# Patient Record
Sex: FEMALE | Race: WHITE | NOT HISPANIC OR LATINO | Employment: FULL TIME | URBAN - METROPOLITAN AREA
[De-identification: names, ages, dates, MRNs, and addresses within clinical notes are randomized per-mention and may not be internally consistent; named-entity substitution may affect disease eponyms.]

---
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
## 2024-02-04 ASSESSMENT — ENCOUNTER SYMPTOMS
CHILLS: 0
SHORTNESS OF BREATH: 0
FEVER: 0

## 2024-02-05 ENCOUNTER — OFFICE VISIT (OUTPATIENT)
Dept: MEDICAL GROUP | Facility: PHYSICIAN GROUP | Age: 19
End: 2024-02-05
Payer: COMMERCIAL

## 2024-02-05 VITALS
HEIGHT: 64 IN | BODY MASS INDEX: 20.01 KG/M2 | WEIGHT: 117.2 LBS | SYSTOLIC BLOOD PRESSURE: 90 MMHG | DIASTOLIC BLOOD PRESSURE: 56 MMHG | HEART RATE: 62 BPM | TEMPERATURE: 98 F | OXYGEN SATURATION: 99 % | RESPIRATION RATE: 16 BRPM

## 2024-02-05 DIAGNOSIS — E72.01 CYSTINURIA (HCC): ICD-10-CM

## 2024-02-05 DIAGNOSIS — N20.0 CALCULUS OF KIDNEY: ICD-10-CM

## 2024-02-05 DIAGNOSIS — F41.9 ANXIETY: ICD-10-CM

## 2024-02-05 PROCEDURE — 3074F SYST BP LT 130 MM HG: CPT | Performed by: PHYSICIAN ASSISTANT

## 2024-02-05 PROCEDURE — 99214 OFFICE O/P EST MOD 30 MIN: CPT | Performed by: PHYSICIAN ASSISTANT

## 2024-02-05 PROCEDURE — 3078F DIAST BP <80 MM HG: CPT | Performed by: PHYSICIAN ASSISTANT

## 2024-02-05 RX ORDER — BUSPIRONE HYDROCHLORIDE 10 MG/1
5-10 TABLET ORAL 2 TIMES DAILY PRN
Qty: 180 TABLET | Refills: 1 | Status: SHIPPED | OUTPATIENT
Start: 2024-02-05

## 2024-02-05 ASSESSMENT — FIBROSIS 4 INDEX: FIB4 SCORE: 0.66

## 2024-02-05 NOTE — LETTER
2024    To Whom It May Concern,    Regarding: Tatiana Chávez : 2005; Emotional Support Animal letter    Tatiana is my patient, and has been under my care since 3/2023. I am intimately aware of Ivory's medical history and functional restrictions brought by her overall health . Ivory meets the definition of disabled under the Americans with Disabilities Act, the Rehabilitation Act of 1973, and the Fair Housing Act.    In order to assist in alleviating difficulties related to her healthcare, and to improve their ability to lead a better life while fully enjoying and using the dwelling unit you own and/or manage, I am prescribing an Emotional Support Animal prescription letter that will help Tatiana in dealing with her disability better.     I am very much aware of the voluminous professional literature related to the therapeutic benefits of emotional support animals for individuals with mental disabilities, such as that faced by Ivory.     I am a licensed physician assistant in the [facility]. My license number is [id number].    Should you have any further questions, please do not hesitate to contact with me.        Ester Lopez P.A.-C.

## 2024-02-05 NOTE — PROGRESS NOTES
"Subjective:     CC: anxiety    HPI:   Ivory presents today with the following:    Problem   Anxiety    Chronic, controlled.   KACY-7: 15   Has been on medications in the past.  Has seen psychiatry and therapy in the past.  Asking for emotional support letter for her dog to help manage day to day issues.  Would like to see therapy again.  Has been using buspirone 10 mg as needed and it does seem to be helping without making her too drowsy.     Cystinuria (Hcc)    Chronic, uncontrolled  Managed by urology.  Was put on a medication called Thiola to help decrease her kidney stone production but apparently this medication is very hard on the kidneys and her nephrologist told her to stop taking it.  She is now having increased stone production as there does not appear to be another solution.  She would like to see if [facility] has urology often running, otherwise she will continue with urology [place].                  ROS:  Review of Systems   Constitutional:  Negative for chills and fever.   Respiratory:  Negative for shortness of breath.    Cardiovascular:  Negative for chest pain.       Objective:     Exam:  BP 90/56 (BP Location: Left arm, Patient Position: Sitting, BP Cuff Size: Adult)   Pulse 62   Temp 36.7 °C (98 °F) (Temporal)   Resp 16   Ht 1.626 m (5' 4\")   Wt 53.2 kg (117 lb 3.2 oz)   LMP 12/29/2023 (Approximate)   SpO2 99%   Breastfeeding No   BMI 20.12 kg/m²  Body mass index is 20.12 kg/m².    Physical Exam  Vitals reviewed.   Constitutional:       General: She is not in acute distress.     Appearance: Normal appearance.   Pulmonary:      Effort: Pulmonary effort is normal.   Neurological:      General: No focal deficit present.      Mental Status: She is alert.   Psychiatric:         Mood and Affect: Mood normal.         Behavior: Behavior normal.         Judgment: Judgment normal.                  Assessment & Plan by Problem:     1. Anxiety  Chronic, intermittent controlled.  Continue buspirone 10 " mg up to twice daily as needed for anxiety.  Referring to therapy.    - Referral to Behavioral Health  - busPIRone (BUSPAR) 10 MG Tab tablet; Take 0.5-1 Tablets by mouth 2 times a day as needed (anxiety/sleep).  Dispense: 180 Tablet; Refill: 1    2. Cystinuria (HCC)  Chronic, intermittent control.  Had to stop Thiola as it was causing kidney issues.  This seems to be the only thing to help prevent kidney stones.  Referring patient to [facility] urology.    - Referral to Urology    3. Nephrolithiasis  See #2.    - Referral to Urology        Please note that this dictation was created using voice recognition software. I have made every reasonable attempt to correct obvious errors, but I expect that there are errors of grammar and possibly content that I did not discover before finalizing the note.

## 2024-02-07 ENCOUNTER — APPOINTMENT (OUTPATIENT)
Dept: RADIOLOGY | Facility: MEDICAL CENTER | Age: 19
End: 2024-02-07
Attending: EMERGENCY MEDICINE

## 2024-02-07 ENCOUNTER — APPOINTMENT (OUTPATIENT)
Dept: RADIOLOGY | Facility: MEDICAL CENTER | Age: 19
End: 2024-02-07

## 2024-02-07 ENCOUNTER — HOSPITAL ENCOUNTER (EMERGENCY)
Facility: MEDICAL CENTER | Age: 19
End: 2024-02-07
Attending: EMERGENCY MEDICINE
Payer: COMMERCIAL

## 2024-02-07 VITALS
WEIGHT: 116.62 LBS | OXYGEN SATURATION: 96 % | TEMPERATURE: 97.6 F | DIASTOLIC BLOOD PRESSURE: 74 MMHG | BODY MASS INDEX: 20.02 KG/M2 | RESPIRATION RATE: 16 BRPM | SYSTOLIC BLOOD PRESSURE: 130 MMHG | HEART RATE: 69 BPM

## 2024-02-07 DIAGNOSIS — R11.0 NAUSEA: ICD-10-CM

## 2024-02-07 DIAGNOSIS — R10.9 FLANK PAIN: ICD-10-CM

## 2024-02-07 LAB
ALBUMIN SERPL BCP-MCNC: 4.6 G/DL (ref 3.2–4.9)
ALBUMIN/GLOB SERPL: 1.6 G/DL
ALP SERPL-CCNC: 54 U/L (ref 30–99)
ALT SERPL-CCNC: 14 U/L (ref 2–50)
AMORPH CRY #/AREA URNS HPF: PRESENT /HPF
ANION GAP SERPL CALC-SCNC: 11 MMOL/L (ref 7–16)
APPEARANCE UR: ABNORMAL
AST SERPL-CCNC: 21 U/L (ref 12–45)
BACTERIA #/AREA URNS HPF: NEGATIVE /HPF
BASOPHILS # BLD AUTO: 0.5 % (ref 0–1.8)
BASOPHILS # BLD: 0.04 K/UL (ref 0–0.12)
BILIRUB SERPL-MCNC: 0.5 MG/DL (ref 0.1–1.5)
BILIRUB UR QL STRIP.AUTO: NEGATIVE
BUN SERPL-MCNC: 14 MG/DL (ref 8–22)
CALCIUM ALBUM COR SERPL-MCNC: 8.9 MG/DL (ref 8.5–10.5)
CALCIUM SERPL-MCNC: 9.4 MG/DL (ref 8.5–10.5)
CHLORIDE SERPL-SCNC: 106 MMOL/L (ref 96–112)
CO2 SERPL-SCNC: 19 MMOL/L (ref 20–33)
COLOR UR: YELLOW
CREAT SERPL-MCNC: 0.65 MG/DL (ref 0.5–1.4)
CYSTINE CRY #/AREA URNS HPF: POSITIVE /HPF
EOSINOPHIL # BLD AUTO: 0.08 K/UL (ref 0–0.51)
EOSINOPHIL NFR BLD: 1.1 % (ref 0–6.9)
EPI CELLS #/AREA URNS HPF: ABNORMAL /HPF
ERYTHROCYTE [DISTWIDTH] IN BLOOD BY AUTOMATED COUNT: 39.6 FL (ref 35.9–50)
FLUAV RNA SPEC QL NAA+PROBE: NEGATIVE
FLUBV RNA SPEC QL NAA+PROBE: NEGATIVE
GFR SERPLBLD CREATININE-BSD FMLA CKD-EPI: 129 ML/MIN/1.73 M 2
GLOBULIN SER CALC-MCNC: 2.9 G/DL (ref 1.9–3.5)
GLUCOSE SERPL-MCNC: 86 MG/DL (ref 65–99)
GLUCOSE UR STRIP.AUTO-MCNC: NEGATIVE MG/DL
HCG SERPL QL: NEGATIVE
HCT VFR BLD AUTO: 43.4 % (ref 37–47)
HGB BLD-MCNC: 15.2 G/DL (ref 12–16)
HYALINE CASTS #/AREA URNS LPF: ABNORMAL /LPF
IMM GRANULOCYTES # BLD AUTO: 0.01 K/UL (ref 0–0.11)
IMM GRANULOCYTES NFR BLD AUTO: 0.1 % (ref 0–0.9)
KETONES UR STRIP.AUTO-MCNC: NEGATIVE MG/DL
LACTATE SERPL-SCNC: 0.8 MMOL/L (ref 0.5–2)
LACTATE SERPL-SCNC: 1.3 MMOL/L (ref 0.5–2)
LEUKOCYTE ESTERASE UR QL STRIP.AUTO: NEGATIVE
LIPASE SERPL-CCNC: 25 U/L (ref 11–82)
LYMPHOCYTES # BLD AUTO: 1.57 K/UL (ref 1–4.8)
LYMPHOCYTES NFR BLD: 20.6 % (ref 22–41)
MCH RBC QN AUTO: 31.7 PG (ref 27–33)
MCHC RBC AUTO-ENTMCNC: 35 G/DL (ref 32.2–35.5)
MCV RBC AUTO: 90.4 FL (ref 81.4–97.8)
MICRO URNS: ABNORMAL
MONOCYTES # BLD AUTO: 0.43 K/UL (ref 0–0.85)
MONOCYTES NFR BLD AUTO: 5.7 % (ref 0–13.4)
NEUTROPHILS # BLD AUTO: 5.48 K/UL (ref 1.82–7.42)
NEUTROPHILS NFR BLD: 72 % (ref 44–72)
NITRITE UR QL STRIP.AUTO: NEGATIVE
NRBC # BLD AUTO: 0 K/UL
NRBC BLD-RTO: 0 /100 WBC (ref 0–0.2)
PH UR STRIP.AUTO: 7 [PH] (ref 5–8)
PLATELET # BLD AUTO: 238 K/UL (ref 164–446)
PMV BLD AUTO: 8.8 FL (ref 9–12.9)
POTASSIUM SERPL-SCNC: 4.6 MMOL/L (ref 3.6–5.5)
PROT SERPL-MCNC: 7.5 G/DL (ref 6–8.2)
PROT UR QL STRIP: NEGATIVE MG/DL
RBC # BLD AUTO: 4.8 M/UL (ref 4.2–5.4)
RBC # URNS HPF: ABNORMAL /HPF
RBC UR QL AUTO: NEGATIVE
RSV RNA SPEC QL NAA+PROBE: NEGATIVE
SARS-COV-2 RNA RESP QL NAA+PROBE: NOTDETECTED
SODIUM SERPL-SCNC: 136 MMOL/L (ref 135–145)
SP GR UR STRIP.AUTO: 1.02
SPECIMEN SOURCE: NORMAL
UROBILINOGEN UR STRIP.AUTO-MCNC: 0.2 MG/DL
WBC # BLD AUTO: 7.6 K/UL (ref 4.8–10.8)
WBC #/AREA URNS HPF: ABNORMAL /HPF

## 2024-02-07 PROCEDURE — 85025 COMPLETE CBC W/AUTO DIFF WBC: CPT

## 2024-02-07 PROCEDURE — 96376 TX/PRO/DX INJ SAME DRUG ADON: CPT

## 2024-02-07 PROCEDURE — 700111 HCHG RX REV CODE 636 W/ 250 OVERRIDE (IP): Mod: JZ | Performed by: EMERGENCY MEDICINE

## 2024-02-07 PROCEDURE — 87086 URINE CULTURE/COLONY COUNT: CPT

## 2024-02-07 PROCEDURE — 96375 TX/PRO/DX INJ NEW DRUG ADDON: CPT

## 2024-02-07 PROCEDURE — 83605 ASSAY OF LACTIC ACID: CPT | Mod: 91

## 2024-02-07 PROCEDURE — 81001 URINALYSIS AUTO W/SCOPE: CPT

## 2024-02-07 PROCEDURE — 87040 BLOOD CULTURE FOR BACTERIA: CPT

## 2024-02-07 PROCEDURE — 71045 X-RAY EXAM CHEST 1 VIEW: CPT

## 2024-02-07 PROCEDURE — 99285 EMERGENCY DEPT VISIT HI MDM: CPT

## 2024-02-07 PROCEDURE — 96374 THER/PROPH/DIAG INJ IV PUSH: CPT

## 2024-02-07 PROCEDURE — 74177 CT ABD & PELVIS W/CONTRAST: CPT

## 2024-02-07 PROCEDURE — 83690 ASSAY OF LIPASE: CPT

## 2024-02-07 PROCEDURE — 700117 HCHG RX CONTRAST REV CODE 255: Performed by: EMERGENCY MEDICINE

## 2024-02-07 PROCEDURE — 0241U HCHG SARS-COV-2 COVID-19 NFCT DS RESP RNA 4 TRGT MIC: CPT

## 2024-02-07 PROCEDURE — 36415 COLL VENOUS BLD VENIPUNCTURE: CPT

## 2024-02-07 PROCEDURE — 700105 HCHG RX REV CODE 258: Performed by: EMERGENCY MEDICINE

## 2024-02-07 PROCEDURE — 84703 CHORIONIC GONADOTROPIN ASSAY: CPT

## 2024-02-07 PROCEDURE — 80053 COMPREHEN METABOLIC PANEL: CPT

## 2024-02-07 RX ORDER — SODIUM CHLORIDE, SODIUM LACTATE, POTASSIUM CHLORIDE, CALCIUM CHLORIDE 600; 310; 30; 20 MG/100ML; MG/100ML; MG/100ML; MG/100ML
1000 INJECTION, SOLUTION INTRAVENOUS ONCE
Status: COMPLETED | OUTPATIENT
Start: 2024-02-07 | End: 2024-02-07

## 2024-02-07 RX ORDER — ONDANSETRON 2 MG/ML
4 INJECTION INTRAMUSCULAR; INTRAVENOUS ONCE
Status: COMPLETED | OUTPATIENT
Start: 2024-02-07 | End: 2024-02-07

## 2024-02-07 RX ORDER — HYDROCODONE BITARTRATE AND ACETAMINOPHEN 5; 325 MG/1; MG/1
1 TABLET ORAL EVERY 6 HOURS PRN
Qty: 8 TABLET | Refills: 0 | Status: SHIPPED | OUTPATIENT
Start: 2024-02-07 | End: 2024-02-09

## 2024-02-07 RX ORDER — MORPHINE SULFATE 4 MG/ML
4 INJECTION INTRAVENOUS ONCE
Status: COMPLETED | OUTPATIENT
Start: 2024-02-07 | End: 2024-02-07

## 2024-02-07 RX ORDER — CEFDINIR 300 MG/1
300 CAPSULE ORAL 2 TIMES DAILY
Qty: 14 CAPSULE | Refills: 0 | Status: ACTIVE | OUTPATIENT
Start: 2024-02-07 | End: 2024-02-14

## 2024-02-07 RX ORDER — ONDANSETRON 4 MG/1
4 TABLET, ORALLY DISINTEGRATING ORAL EVERY 8 HOURS PRN
Qty: 6 TABLET | Refills: 0 | Status: SHIPPED | OUTPATIENT
Start: 2024-02-07 | End: 2024-02-09

## 2024-02-07 RX ADMIN — MORPHINE SULFATE 4 MG: 4 INJECTION, SOLUTION INTRAMUSCULAR; INTRAVENOUS at 11:58

## 2024-02-07 RX ADMIN — ONDANSETRON 4 MG: 2 INJECTION INTRAMUSCULAR; INTRAVENOUS at 11:59

## 2024-02-07 RX ADMIN — ONDANSETRON 4 MG: 2 INJECTION INTRAMUSCULAR; INTRAVENOUS at 14:35

## 2024-02-07 RX ADMIN — MORPHINE SULFATE 4 MG: 4 INJECTION, SOLUTION INTRAMUSCULAR; INTRAVENOUS at 15:52

## 2024-02-07 RX ADMIN — SODIUM CHLORIDE, POTASSIUM CHLORIDE, SODIUM LACTATE AND CALCIUM CHLORIDE 1000 ML: 600; 310; 30; 20 INJECTION, SOLUTION INTRAVENOUS at 13:30

## 2024-02-07 RX ADMIN — IOHEXOL 80 ML: 350 INJECTION, SOLUTION INTRAVENOUS at 16:15

## 2024-02-07 ASSESSMENT — PAIN DESCRIPTION - PAIN TYPE: TYPE: ACUTE PAIN

## 2024-02-07 ASSESSMENT — FIBROSIS 4 INDEX: FIB4 SCORE: 0.66

## 2024-02-07 NOTE — ED TRIAGE NOTES
Chief Complaint   Patient presents with    Nausea     X 2 days    Flank Pain     R sided flank pain that started last night.  C/O urgency, burning with urination x 4 days.  H/O kidney disease and frequent kidney stones.     Pt declined protocl zofran at this time, as she took some at 0500 this morning.  Sepsis protocol initiated.  SARS screening = sepsis watch.

## 2024-02-07 NOTE — ED PROVIDER NOTES
"ED Provider Note    CHIEF COMPLAINT  Chief Complaint   Patient presents with    Nausea     X 2 days    Flank Pain     R sided flank pain that started last night.  C/O urgency, burning with urination x 4 days.  H/O kidney disease and frequent kidney stones.       EXTERNAL RECORDS REVIEWED  Other patient had urine culture showing E. coli which is pansensitive in February 2023 .  Patient is followed by Dr. Muñoz, nephrologist, for cystinuria as well as CKD 1.  Patient has a history of lithotripsy, hydronephrosis, nephrolithiasis, and ureteral stents.  Patient was admitted here at [facility] in August 2023 for significant bilateral hydronephrosis with obstructive kidney stones, worse on the left than the right.  She underwent cystoscopy and bilateral ureteroscopy with laser lithotripsy and bilateral ureteral stent placement on August 27, 2023.  Symptoms improved and she was cleared by urology and discharged home.    Patient was seen by her primary care doctor 2 days ago for anxiety.    HPI/ROS  LIMITATION TO HISTORY   Select: : None  OUTSIDE HISTORIAN(S):  None    Ivory Chávez is a 19 y.o. female who presents to the ER complaining of bilateral flank pain, right greater than left which began last night in the middle the night.  Patient says she developed nausea 2 days ago.  The nausea got worse last night.  She had 1 episode of vomiting.  She says it feels like her \"kidney pain.\"  She has a long history of kidney stones and hydronephrosis.  She has had several kidney surgeries within the last1 year with Dr. Ramirez and Dr. Harrison.  Patient states she has chronic urinary tract infections.  She says she has a standing prescription for cefdinir.  The last time she took cefdinir was in December.  She says she feels like she has urinary tract infection.  She has had some pain with urination.  No hematuria.  Last night she had subjective fevers and shaking chills.  She did not take her temperature.  She says " she has chronic diarrhea due to IBS.  Nothing new or different.  No cough, runny nose or sore throat.  She still has an appendix on her gallbladder.    PAST MEDICAL HISTORY   has a past medical history of Allergy, Anxiety, Cystinuria (HCC), Cystinuria (HCC), Nondisplaced bimalleolar fracture of left lower leg, initial encounter for closed fracture (2007), Psychiatric problem, Renal disorder, and Urinary tract infection.    SURGICAL HISTORY   has a past surgical history that includes other (2006); percutaneous nephrostolithotomy (Left, 11/5/2018); stent replacement (Left, 11/5/2018); ureteroscopy (Left, 11/13/2018); stent placement (Left, 11/13/2018); cystoscopy (11/13/2018); lithotripsy (Left, 11/13/2018); cystoscopy,insert ureteral stent (Left, 8/19/2019); lasertripsy (Left, 8/19/2019); cystoscopy,insert ureteral stent (Left, 7/1/2020); cysto/uretero/pyeloscopy, dx (Left, 7/1/2020); lasertripsy (7/1/2020); cystoscopy,insert ureteral stent (Left, 2/24/2022); other orthopedic surgery (2021); cystoscopy,insert ureteral stent (Left, 3/7/2022); cysto/uretero/pyeloscopy, dx (Left, 3/7/2022); lasertripsy (Left, 3/7/2022); cystoscopy,insert ureteral stent (N/A, 3/16/2023); cysto/uretero/pyeloscopy, dx (Left, 3/16/2023); cysto stent placemnt pre surg (Bilateral, 8/27/2023); and lasertripsy (Bilateral, 8/27/2023).    FAMILY HISTORY  Family History   Problem Relation Age of Onset    No Known Problems Mother     No Known Problems Father     No Known Problems Sister     No Known Problems Sister     No Known Problems Brother     No Known Problems Maternal Grandmother     No Known Problems Maternal Grandfather     No Known Problems Paternal Grandmother     No Known Problems Paternal Grandfather     Cancer Neg Hx     Diabetes Neg Hx     Heart Disease Neg Hx     Hyperlipidemia Neg Hx     Hypertension Neg Hx     Stroke Neg Hx        SOCIAL HISTORY  Social History     Tobacco Use    Smoking status: Never    Smokeless tobacco: Never    Vaping Use    Vaping Use: Former    Substances: Nicotine    Devices: Disposable   Substance and Sexual Activity    Alcohol use: Not Currently    Drug use: Never    Sexual activity: Yes     Partners: Male     Birth control/protection: Injection       CURRENT MEDICATIONS  Home Medications       Reviewed by Heather Maloney R.N. (Registered Nurse) on 02/07/24 at 0939  Med List Status: Partial     Medication Last Dose Status   busPIRone (BUSPAR) 10 MG Tab tablet  Active   EPINEPHrine (EPIPEN) 0.3 MG/0.3ML Solution Auto-injector solution for injection  Active   ibuprofen (MOTRIN) 400 MG Tab  Active   omeprazole (PRILOSEC) 40 MG delayed-release capsule  Active   ondansetron (ZOFRAN ODT) 4 MG TABLET DISPERSIBLE  Active   Potassium Citrate 15 MEQ (1620 MG) Tab CR  Active   Tiopronin (THIOLA EC) 300 MG Tablet Delayed Response  Active                    ALLERGIES  Allergies   Allergen Reactions    Latex Anaphylaxis       PHYSICAL EXAM  VITAL SIGNS: /74   Pulse 69   Temp 36.4 °C (97.6 °F) (Temporal)   Resp 16   Wt 52.9 kg (116 lb 10 oz)   LMP 12/29/2023 (Approximate)   SpO2 96%   BMI 20.02 kg/m²    Constitutional:  Well developed, well nourished; mild to moderate distress.  Crying and tearful.  HENT: Normocephalic, Atraumatic, Bilateral external ears normal, slightly dry mucous membranes.  Mild erythema in the posterior pharynx.  No exudate.  Eyes: PERRL, EOMI, Conjunctiva normal, No discharge.   Neck: Normal range of motion, supple, nontender  Lymphatic: No lymphadenopathy noted.   Cardiovascular: Normal heart rate, Normal rhythm, No murmurs, rubs or gallops   Thorax & Lungs: CTA=bilaterally;  No respiratory distress,  No wheezing rales, or rhonchi; No chest tenderness. No crepitus or subQ air  Abdomen: Soft, good bowel sounds, tender to percussion in the left lower quadrant.  Tender to palpation suprapubic.  No guarding no rebound, no masses, no pulsatile mass, no tenderness, no distention  Skin: Warm, Dry,  No erythema, No rash.   Back: No tenderness, bilateral CVA tenderness, right greater than left.   Extremities: 2+ dp and pt pulses bilateral LEs;  Nontender; no pretibial edema  Neurologic: Alert & oriented x 4, clear speech  Psychiatric: appropriate, normal affect     DIAGNOSTIC STUDIES / PROCEDURES      LABS  Results for orders placed or performed during the hospital encounter of 02/07/24   Lactic Acid   Result Value Ref Range    Lactic Acid 0.8 0.5 - 2.0 mmol/L   Lactic Acid   Result Value Ref Range    Lactic Acid 1.3 0.5 - 2.0 mmol/L   CBC with Differential   Result Value Ref Range    WBC 7.6 4.8 - 10.8 K/uL    RBC 4.80 4.20 - 5.40 M/uL    Hemoglobin 15.2 12.0 - 16.0 g/dL    Hematocrit 43.4 37.0 - 47.0 %    MCV 90.4 81.4 - 97.8 fL    MCH 31.7 27.0 - 33.0 pg    MCHC 35.0 32.2 - 35.5 g/dL    RDW 39.6 35.9 - 50.0 fL    Platelet Count 238 164 - 446 K/uL    MPV 8.8 (L) 9.0 - 12.9 fL    Neutrophils-Polys 72.00 44.00 - 72.00 %    Lymphocytes 20.60 (L) 22.00 - 41.00 %    Monocytes 5.70 0.00 - 13.40 %    Eosinophils 1.10 0.00 - 6.90 %    Basophils 0.50 0.00 - 1.80 %    Immature Granulocytes 0.10 0.00 - 0.90 %    Nucleated RBC 0.00 0.00 - 0.20 /100 WBC    Neutrophils (Absolute) 5.48 1.82 - 7.42 K/uL    Lymphs (Absolute) 1.57 1.00 - 4.80 K/uL    Monos (Absolute) 0.43 0.00 - 0.85 K/uL    Eos (Absolute) 0.08 0.00 - 0.51 K/uL    Baso (Absolute) 0.04 0.00 - 0.12 K/uL    Immature Granulocytes (abs) 0.01 0.00 - 0.11 K/uL    NRBC (Absolute) 0.00 K/uL   Complete Metabolic Panel   Result Value Ref Range    Sodium 136 135 - 145 mmol/L    Potassium 4.6 3.6 - 5.5 mmol/L    Chloride 106 96 - 112 mmol/L    Co2 19 (L) 20 - 33 mmol/L    Anion Gap 11.0 7.0 - 16.0    Glucose 86 65 - 99 mg/dL    Bun 14 8 - 22 mg/dL    Creatinine 0.65 0.50 - 1.40 mg/dL    Calcium 9.4 8.5 - 10.5 mg/dL    Correct Calcium 8.9 8.5 - 10.5 mg/dL    AST(SGOT) 21 12 - 45 U/L    ALT(SGPT) 14 2 - 50 U/L    Alkaline Phosphatase 54 30 - 99 U/L    Total Bilirubin 0.5 0.1  - 1.5 mg/dL    Albumin 4.6 3.2 - 4.9 g/dL    Total Protein 7.5 6.0 - 8.2 g/dL    Globulin 2.9 1.9 - 3.5 g/dL    A-G Ratio 1.6 g/dL   Urinalysis    Specimen: Urine, Clean Catch; Blood   Result Value Ref Range    Color Yellow     Character Turbid (A)     Specific Gravity 1.018 <1.035    Ph 7.0 5.0 - 8.0    Glucose Negative Negative mg/dL    Ketones Negative Negative mg/dL    Protein Negative Negative mg/dL    Bilirubin Negative Negative    Urobilinogen, Urine 0.2 Negative    Nitrite Negative Negative    Leukocyte Esterase Negative Negative    Occult Blood Negative Negative    Micro Urine Req Microscopic    URINE MICROSCOPIC (W/UA)   Result Value Ref Range    WBC 2-5 /hpf    RBC 0-2 /hpf    Bacteria Negative None /hpf    Epithelial Cells Few /hpf    Amorphous Crystal Present /hpf    Cystine Crystal Positive (A) /hpf    Hyaline Cast 0-2 /lpf   CoV-2, FLU A/B, and RSV by PCR (2-4 Hours CEPHEID) : Collect NP swab in VTM    Specimen: Respirate   Result Value Ref Range    Influenza virus A RNA Negative Negative    Influenza virus B, PCR Negative Negative    RSV, PCR Negative Negative    SARS-CoV-2 by PCR NotDetected     SARS-CoV-2 Source NP Swab    LIPASE   Result Value Ref Range    Lipase 25 11 - 82 U/L   HCG QUAL SERUM   Result Value Ref Range    Beta-Hcg Qualitative Serum Negative Negative   ESTIMATED GFR   Result Value Ref Range    GFR (CKD-EPI) 129 >60 mL/min/1.73 m 2        RADIOLOGY  I have independently interpreted the diagnostic imaging associated with this visit and am waiting the final reading from the radiologist.     My preliminary interpretation is as follows: SHAHAB TREADWELL is reviewed the patient's CT scan.  There is no obvious hydronephrosis.    Radiologist interpretation:   CT-ABDOMEN-PELVIS WITH   Final Result      1.  Probable small bilateral nonobstructing kidney stones.   2.  No ureteral stone or hydronephrosis.   3.  No secondary signs of acute appendicitis, however the appendix is partially obscured.   4.   "Probable RIGHT ovarian follicle.   5.  Increased colonic stool.         DX-CHEST-PORTABLE (1 VIEW)   Final Result         1. No acute cardiopulmonary abnormalities are identified.           COURSE & MEDICAL DECISION MAKING    ED Observation Status? No; Patient does not meet criteria for ED Observation.     INITIAL ASSESSMENT, COURSE AND PLAN  Care Narrative: Patient presents to the ER complaining of nausea for the last several days.  Last night she started having bilateral flank pain, right greater than left.  She says that this pain \"feels like my kidney pain.\"  She reported having some subjective fever and chills last night when the pain started.  Patient has some right-sided CVA tenderness.  She had some mild right upper quadrant abdominal tenderness.  She has long history of nephrolithiasis, ureteral stones, urologic procedures for hydronephrosis and stones, as well as cystinuria.  She is followed by nephrology as well as by urology.  Patient was last admitted here in August 2023 for bilateral hydronephrosis due to bilateral kidney stones.  The patient denies having fevers or chills.  She said she vomited once.  No diarrhea.  No hematuria or cloudy/foul-smelling urine.  No cough, runny nose or sore throat.  Patient was given several rounds of pain medication with improvement.  She was also given several rounds of nausea medicine with improvement.  All of her labs look totally normal.  Lactic acid level is normal.  White count is normal.  Renal functions normal.  No evidence of pancreatitis.  She is not pregnant.  Chest x-ray is negative.  COVID, flu and RSV are negative.  No evidence of pneumonia.  No evidence of COVID, flu or RSV.  CT scan is negative.  No evidence of ureteral stones.  No evidence of hydronephrosis.  No evidence of appendicitis or any other dangerous abdominal pathology.  At this time I think the patient is safe and stable for outpatient management discharge home.  Since she is only had pain " though for less than 24 hours, I told her if she still having pain in the next 18 to 24 hours she should return for recheck.  She agrees to do so.  Otherwise I will send her home with prescription for pain medication as well as for nausea medicine.  She has been given very strict return precautions and discharge instructions and she understands treatment plan and follow-up.  At this time I do not have a great explanation for her pain, although she says it feels very much like her kidney pain.  Perhaps she has some scar tissue causing her discomfort from all of her urologic procedures in the past.  Perhaps she has some renal colic as a result of all of her urologic issues.  Nonetheless, she is not septic or toxic.  I think she is stable for outpatient management discharge home at this time.  I have asked that she follow-up with her primary care physician and her urologist within the next several days possible.       HYDRATION: Based on the patient's presentation of Acute Vomiting the patient was given IV fluids. IV Hydration was used because oral hydration was not as rapid as required. Upon recheck following hydration, the patient was improved.      ADDITIONAL PROBLEM LIST  Problem #1: Nausea x 2 days  Problem #2: Bilateral flank pain, right greater than left, since last night    DISPOSITION AND DISCUSSIONS  I have discussed management of the patient with the following physicians and LAVINIA's: None    Discussion of management with other QHP or appropriate source(s): None     Escalation of care considered, and ultimately not performed: At this time I do not think patient needs to be admitted.  White count is normal.  Lactic acid level is normal.  Urine is clear.  CT scan is negative.  No evidence of hydronephrosis or ureteral stone.  No other dangerous intra-abdominal pathology to explain her pain or symptoms.  Vitals are normal and stable.  Lab work is unremarkable.  I think she is safe for outpatient management  discharge home with close follow-up here in the ER in 18 to 24 hours if she still is having pain.  Patient understands importance of returning if pain is not improved in the 18 to 24 hours.    Barriers to care at this time, including but not limited to:  None known .     Decision tools and prescription drugs considered including, but not limited to: Antibiotics patient has a history of urinary tract infections.  She has not had a positive urine culture since February 2023.  However, she has been told that if she develops any urinary symptoms she should take cefdinir.  The last time she took cefdinir was in December.  I will go ahead and write her for prescription for cefdinir pending urine culture that that is currently pending. .    In prescribing controlled substances to this patient, I certify that I have obtained and reviewed the medical history of Ivory Chávez. I have also made a good artemio effort to obtain applicable records from other providers who have treated the patient and records did not demonstrate any increased risk of substance abuse that would prevent me from prescribing controlled substances.     I have conducted a physical exam and documented it. I have reviewed Ms. Chávez’s prescription history as maintained by the [place] Prescription Monitoring Program.  Patient has had several narcotic prescriptions filled in the last year, but none in the last few months.    I have assessed the patient’s risk for abuse, dependency, and addiction using the validated Opioid Risk Tool available at https://www.mdcalc.com/jdhais-pzvc-exwv-ort-narcotic-abuse.  Risk score is 4    Given the above, I believe the benefits of controlled substance therapy outweigh the risks. The reasons for prescribing controlled substances include non-narcotic, oral analgesic alternatives have been inadequate for pain control. Accordingly, I have discussed the risk and benefits, treatment plan, and alternative therapies  with the patient.      FINAL DIAGNOSIS  1. Flank pain Acute   2. Nausea Acute        This dictation has been created using voice recognition software. The accuracy of the dictation is limited by the abilities of the software. I expect there may be some errors of grammar and possibly content. I made every attempt to manually correct the errors within my dictation. However, errors related to voice recognition software may still exist and should be interpreted within the appropriate context.     Electronically signed by: Kori Ash M.D., 2/7/2024 11:07 AM

## 2024-02-07 NOTE — ED NOTES
Pt resting in bed, additional blood drawn and sent to lab. IVF infusing slower than expected.  Pt encouraged to keep arm straight.

## 2024-02-08 NOTE — DISCHARGE INSTRUCTIONS
Follow-up with your primary care practitioner as well as your urologist this week.  Please call for appointment.    Return to the ER for any worsening flank pain, changing flank pain, worsening abdominal pain, changing abdominal pain, fevers over 100.4, shaking chills, pain with urination, cloudy or foul-smelling urine, blood in urine, vomiting, or for any concerns.    Drink plenty of fluids to stay well-hydrated.

## 2024-02-09 LAB
BACTERIA UR CULT: NORMAL
SIGNIFICANT IND 70042: NORMAL
SITE SITE: NORMAL
SOURCE SOURCE: NORMAL

## 2024-02-12 LAB
BACTERIA BLD CULT: NORMAL
BACTERIA BLD CULT: NORMAL
SIGNIFICANT IND 70042: NORMAL
SIGNIFICANT IND 70042: NORMAL
SITE SITE: NORMAL
SITE SITE: NORMAL
SOURCE SOURCE: NORMAL
SOURCE SOURCE: NORMAL

## 2024-02-16 ENCOUNTER — PATIENT MESSAGE (OUTPATIENT)
Dept: HEALTH INFORMATION MANAGEMENT | Facility: OTHER | Age: 19
End: 2024-02-16

## 2024-03-07 RX ORDER — EPINEPHRINE 0.3 MG/.3ML
INJECTION SUBCUTANEOUS
Qty: 1 EACH | Refills: 5 | Status: SHIPPED | OUTPATIENT
Start: 2024-03-07

## 2024-04-15 ENCOUNTER — TELEPHONE (OUTPATIENT)
Dept: MEDICAL GROUP | Facility: PHYSICIAN GROUP | Age: 19
End: 2024-04-15
Payer: COMMERCIAL

## 2024-04-16 ENCOUNTER — HOSPITAL ENCOUNTER (OUTPATIENT)
Facility: MEDICAL CENTER | Age: 19
End: 2024-04-16
Attending: PHYSICIAN ASSISTANT
Payer: COMMERCIAL

## 2024-04-16 DIAGNOSIS — R31.9 HEMATURIA, UNSPECIFIED TYPE: ICD-10-CM

## 2024-04-16 PROCEDURE — 87086 URINE CULTURE/COLONY COUNT: CPT

## 2024-04-16 RX ORDER — CEFDINIR 300 MG/1
300 CAPSULE ORAL 2 TIMES DAILY
Qty: 10 CAPSULE | Refills: 0 | Status: SHIPPED | OUTPATIENT
Start: 2024-04-16 | End: 2024-04-21

## 2024-04-19 LAB
BACTERIA UR CULT: NORMAL
SIGNIFICANT IND 70042: NORMAL
SITE SITE: NORMAL
SOURCE SOURCE: NORMAL

## 2024-05-08 ENCOUNTER — OFFICE VISIT (OUTPATIENT)
Dept: URGENT CARE | Facility: PHYSICIAN GROUP | Age: 19
End: 2024-05-08

## 2024-05-08 VITALS
BODY MASS INDEX: 19.81 KG/M2 | WEIGHT: 116 LBS | HEART RATE: 60 BPM | RESPIRATION RATE: 20 BRPM | HEIGHT: 64 IN | TEMPERATURE: 97.3 F | SYSTOLIC BLOOD PRESSURE: 102 MMHG | DIASTOLIC BLOOD PRESSURE: 72 MMHG | OXYGEN SATURATION: 96 %

## 2024-05-08 DIAGNOSIS — J32.9 RHINOSINUSITIS: ICD-10-CM

## 2024-05-08 DIAGNOSIS — R05.1 ACUTE COUGH: ICD-10-CM

## 2024-05-08 DIAGNOSIS — R11.0 NAUSEA: ICD-10-CM

## 2024-05-08 DIAGNOSIS — J02.9 PHARYNGITIS, UNSPECIFIED ETIOLOGY: ICD-10-CM

## 2024-05-08 LAB
FLUAV RNA SPEC QL NAA+PROBE: NEGATIVE
FLUBV RNA SPEC QL NAA+PROBE: NEGATIVE
RSV RNA SPEC QL NAA+PROBE: NEGATIVE
S PYO DNA SPEC NAA+PROBE: NOT DETECTED
SARS-COV-2 RNA RESP QL NAA+PROBE: NEGATIVE

## 2024-05-08 PROCEDURE — 99213 OFFICE O/P EST LOW 20 MIN: CPT | Performed by: FAMILY MEDICINE

## 2024-05-08 PROCEDURE — 0241U POCT CEPHEID COV-2, FLU A/B, RSV - PCR: CPT | Performed by: FAMILY MEDICINE

## 2024-05-08 PROCEDURE — 87651 STREP A DNA AMP PROBE: CPT | Performed by: FAMILY MEDICINE

## 2024-05-08 PROCEDURE — 3078F DIAST BP <80 MM HG: CPT | Performed by: FAMILY MEDICINE

## 2024-05-08 PROCEDURE — 3074F SYST BP LT 130 MM HG: CPT | Performed by: FAMILY MEDICINE

## 2024-05-08 RX ORDER — DEXTROMETHORPHAN HYDROBROMIDE AND PROMETHAZINE HYDROCHLORIDE 15; 6.25 MG/5ML; MG/5ML
5 SYRUP ORAL 4 TIMES DAILY PRN
Qty: 120 ML | Refills: 0 | Status: SHIPPED | OUTPATIENT
Start: 2024-05-08

## 2024-05-08 RX ORDER — AMOXICILLIN AND CLAVULANATE POTASSIUM 875; 125 MG/1; MG/1
1 TABLET, FILM COATED ORAL 2 TIMES DAILY
Qty: 14 TABLET | Refills: 0 | Status: SHIPPED | OUTPATIENT
Start: 2024-05-08 | End: 2024-05-15

## 2024-05-08 RX ORDER — ONDANSETRON 4 MG/1
4 TABLET, ORALLY DISINTEGRATING ORAL EVERY 8 HOURS PRN
Qty: 6 TABLET | Refills: 0 | Status: SHIPPED | OUTPATIENT
Start: 2024-05-08

## 2024-05-08 ASSESSMENT — ENCOUNTER SYMPTOMS
EYE REDNESS: 0
MYALGIAS: 0
VOMITING: 0
NAUSEA: 0
WEIGHT LOSS: 0
EYE DISCHARGE: 0

## 2024-05-08 ASSESSMENT — FIBROSIS 4 INDEX: FIB4 SCORE: 0.47

## 2024-05-08 NOTE — LETTER
May 8, 2024         Patient: Ivory Chávez   YOB: 2005   Date of Visit: 5/8/2024           To Whom it May Concern:    Ivory Chávez was seen in my clinic on 5/8/2024. Please excuse work absences due to illness. She may return when improving and without fever for 24 hours.     Sincerely,           Zacarias Kumar M.D.  Electronically Signed

## 2024-05-08 NOTE — PROGRESS NOTES
"Subjective     Ivory Chávez is a 19 y.o. female who presents with Cough (7 days ), Sore Throat (7 days ), and Congestion (7 days )            7 days initially N/V that resolved. Persistent ST. Productive cough without blood in sputum. Post-tussive vomiting. Intermittent fever. SOB. No wheeze. No PMH asthma/pneumonia. Sinus pressure and drainage. No other aggravating or alleviating factors.          Review of Systems   Constitutional:  Negative for malaise/fatigue and weight loss.   Eyes:  Negative for discharge and redness.   Gastrointestinal:  Negative for nausea and vomiting.   Musculoskeletal:  Negative for joint pain and myalgias.   Skin:  Negative for itching and rash.              Objective     /72   Pulse 60   Temp 36.3 °C (97.3 °F) (Temporal)   Resp 20   Ht 1.62 m (5' 3.78\")   Wt 52.6 kg (116 lb)   SpO2 96%   BMI 20.05 kg/m²      Physical Exam  Constitutional:       General: She is not in acute distress.     Appearance: She is well-developed.   HENT:      Head: Normocephalic and atraumatic.      Right Ear: Tympanic membrane normal.      Left Ear: Tympanic membrane normal.      Nose: Congestion present.      Mouth/Throat:      Mouth: Mucous membranes are moist.      Pharynx: Posterior oropharyngeal erythema present.      Comments: PND  Eyes:      Conjunctiva/sclera: Conjunctivae normal.   Cardiovascular:      Rate and Rhythm: Normal rate and regular rhythm.      Heart sounds: Normal heart sounds. No murmur heard.  Pulmonary:      Effort: Pulmonary effort is normal.      Breath sounds: Normal breath sounds. No wheezing.   Musculoskeletal:      Cervical back: Neck supple.   Lymphadenopathy:      Cervical: Cervical adenopathy (shotty) present.   Skin:     General: Skin is warm and dry.      Findings: No rash.   Neurological:      Mental Status: She is alert.                             Assessment & Plan   POCT PCR COVID-19, influenza, and RSV negative  POCT PCR strep negative    1. Acute " cough  promethazine-dextromethorphan (PROMETHAZINE-DM) 6.25-15 MG/5ML syrup    POCT CEPHEID COV-2, FLU A/B, RSV - PCR      2. Pharyngitis, unspecified etiology  POCT CEPHEID COV-2, FLU A/B, RSV - PCR    POCT CEPHEID GROUP A STREP - PCR      3. Rhinosinusitis  amoxicillin-clavulanate (AUGMENTIN) 875-125 MG Tab      4. Nausea  ondansetron (ZOFRAN ODT) 4 MG TABLET DISPERSIBLE        Nasal saline.  Nasal corticosteroid.    Contingent antibiotic prescription given to patient to fill upon meeting criteria of guidelines discussed.     Differential diagnosis, natural history, supportive care, and indications for immediate follow-up were discussed.

## 2024-05-20 ENCOUNTER — APPOINTMENT (OUTPATIENT)
Dept: MEDICAL GROUP | Facility: PHYSICIAN GROUP | Age: 19
End: 2024-05-20

## 2024-06-05 ENCOUNTER — HOSPITAL ENCOUNTER (EMERGENCY)
Facility: MEDICAL CENTER | Age: 19
End: 2024-06-05
Attending: EMERGENCY MEDICINE

## 2024-06-05 ENCOUNTER — APPOINTMENT (OUTPATIENT)
Dept: RADIOLOGY | Facility: MEDICAL CENTER | Age: 19
End: 2024-06-05
Attending: EMERGENCY MEDICINE

## 2024-06-05 VITALS
OXYGEN SATURATION: 96 % | SYSTOLIC BLOOD PRESSURE: 124 MMHG | DIASTOLIC BLOOD PRESSURE: 62 MMHG | BODY MASS INDEX: 20.66 KG/M2 | TEMPERATURE: 98.5 F | HEART RATE: 64 BPM | HEIGHT: 63 IN | RESPIRATION RATE: 18 BRPM | WEIGHT: 116.62 LBS

## 2024-06-05 DIAGNOSIS — R33.9 URINARY RETENTION: ICD-10-CM

## 2024-06-05 LAB
ALBUMIN SERPL BCP-MCNC: 4.5 G/DL (ref 3.2–4.9)
ALBUMIN/GLOB SERPL: 1.8 G/DL
ALP SERPL-CCNC: 63 U/L (ref 30–99)
ALT SERPL-CCNC: 13 U/L (ref 2–50)
ANION GAP SERPL CALC-SCNC: 13 MMOL/L (ref 7–16)
APPEARANCE UR: ABNORMAL
AST SERPL-CCNC: 15 U/L (ref 12–45)
BACTERIA #/AREA URNS HPF: ABNORMAL /HPF
BASOPHILS # BLD AUTO: 0.5 % (ref 0–1.8)
BASOPHILS # BLD: 0.04 K/UL (ref 0–0.12)
BILIRUB SERPL-MCNC: 0.9 MG/DL (ref 0.1–1.5)
BILIRUB UR QL STRIP.AUTO: NEGATIVE
BUN SERPL-MCNC: 10 MG/DL (ref 8–22)
CALCIUM ALBUM COR SERPL-MCNC: 9 MG/DL (ref 8.5–10.5)
CALCIUM SERPL-MCNC: 9.4 MG/DL (ref 8.5–10.5)
CHLORIDE SERPL-SCNC: 103 MMOL/L (ref 96–112)
CO2 SERPL-SCNC: 20 MMOL/L (ref 20–33)
COLOR UR: YELLOW
CREAT SERPL-MCNC: 0.62 MG/DL (ref 0.5–1.4)
EOSINOPHIL # BLD AUTO: 0.13 K/UL (ref 0–0.51)
EOSINOPHIL NFR BLD: 1.6 % (ref 0–6.9)
EPI CELLS #/AREA URNS HPF: ABNORMAL /HPF
ERYTHROCYTE [DISTWIDTH] IN BLOOD BY AUTOMATED COUNT: 43.9 FL (ref 35.9–50)
GFR SERPLBLD CREATININE-BSD FMLA CKD-EPI: 130 ML/MIN/1.73 M 2
GLOBULIN SER CALC-MCNC: 2.5 G/DL (ref 1.9–3.5)
GLUCOSE SERPL-MCNC: 89 MG/DL (ref 65–99)
GLUCOSE UR STRIP.AUTO-MCNC: NEGATIVE MG/DL
HCG SERPL QL: NEGATIVE
HCG UR QL: NEGATIVE
HCT VFR BLD AUTO: 43.5 % (ref 37–47)
HGB BLD-MCNC: 15.1 G/DL (ref 12–16)
HYALINE CASTS #/AREA URNS LPF: ABNORMAL /LPF
IMM GRANULOCYTES # BLD AUTO: 0.02 K/UL (ref 0–0.11)
IMM GRANULOCYTES NFR BLD AUTO: 0.2 % (ref 0–0.9)
KETONES UR STRIP.AUTO-MCNC: NEGATIVE MG/DL
LEUKOCYTE ESTERASE UR QL STRIP.AUTO: ABNORMAL
LYMPHOCYTES # BLD AUTO: 1.24 K/UL (ref 1–4.8)
LYMPHOCYTES NFR BLD: 14.9 % (ref 22–41)
MCH RBC QN AUTO: 32.4 PG (ref 27–33)
MCHC RBC AUTO-ENTMCNC: 34.7 G/DL (ref 32.2–35.5)
MCV RBC AUTO: 93.3 FL (ref 81.4–97.8)
MICRO URNS: ABNORMAL
MONOCYTES # BLD AUTO: 0.63 K/UL (ref 0–0.85)
MONOCYTES NFR BLD AUTO: 7.6 % (ref 0–13.4)
NEUTROPHILS # BLD AUTO: 6.26 K/UL (ref 1.82–7.42)
NEUTROPHILS NFR BLD: 75.2 % (ref 44–72)
NITRITE UR QL STRIP.AUTO: NEGATIVE
NRBC # BLD AUTO: 0 K/UL
NRBC BLD-RTO: 0 /100 WBC (ref 0–0.2)
PH UR STRIP.AUTO: 8.5 [PH] (ref 5–8)
PLATELET # BLD AUTO: 209 K/UL (ref 164–446)
PMV BLD AUTO: 8.4 FL (ref 9–12.9)
POTASSIUM SERPL-SCNC: 3.9 MMOL/L (ref 3.6–5.5)
PROT SERPL-MCNC: 7 G/DL (ref 6–8.2)
PROT UR QL STRIP: NEGATIVE MG/DL
RBC # BLD AUTO: 4.66 M/UL (ref 4.2–5.4)
RBC # URNS HPF: ABNORMAL /HPF
RBC UR QL AUTO: NEGATIVE
SODIUM SERPL-SCNC: 136 MMOL/L (ref 135–145)
SP GR UR STRIP.AUTO: 1.01
UROBILINOGEN UR STRIP.AUTO-MCNC: 0.2 MG/DL
WBC # BLD AUTO: 8.3 K/UL (ref 4.8–10.8)
WBC #/AREA URNS HPF: ABNORMAL /HPF

## 2024-06-05 PROCEDURE — 700111 HCHG RX REV CODE 636 W/ 250 OVERRIDE (IP): Performed by: EMERGENCY MEDICINE

## 2024-06-05 PROCEDURE — 99284 EMERGENCY DEPT VISIT MOD MDM: CPT

## 2024-06-05 PROCEDURE — 87077 CULTURE AEROBIC IDENTIFY: CPT

## 2024-06-05 PROCEDURE — 80053 COMPREHEN METABOLIC PANEL: CPT

## 2024-06-05 PROCEDURE — 84703 CHORIONIC GONADOTROPIN ASSAY: CPT

## 2024-06-05 PROCEDURE — 36415 COLL VENOUS BLD VENIPUNCTURE: CPT

## 2024-06-05 PROCEDURE — 85025 COMPLETE CBC W/AUTO DIFF WBC: CPT

## 2024-06-05 PROCEDURE — 96375 TX/PRO/DX INJ NEW DRUG ADDON: CPT

## 2024-06-05 PROCEDURE — 87186 SC STD MICRODIL/AGAR DIL: CPT

## 2024-06-05 PROCEDURE — 81001 URINALYSIS AUTO W/SCOPE: CPT

## 2024-06-05 PROCEDURE — 96374 THER/PROPH/DIAG INJ IV PUSH: CPT

## 2024-06-05 PROCEDURE — 51798 US URINE CAPACITY MEASURE: CPT

## 2024-06-05 PROCEDURE — 700105 HCHG RX REV CODE 258: Performed by: EMERGENCY MEDICINE

## 2024-06-05 PROCEDURE — 87086 URINE CULTURE/COLONY COUNT: CPT

## 2024-06-05 PROCEDURE — 81025 URINE PREGNANCY TEST: CPT

## 2024-06-05 PROCEDURE — 74176 CT ABD & PELVIS W/O CONTRAST: CPT

## 2024-06-05 RX ORDER — MORPHINE SULFATE 4 MG/ML
4 INJECTION INTRAVENOUS ONCE
Status: COMPLETED | OUTPATIENT
Start: 2024-06-05 | End: 2024-06-05

## 2024-06-05 RX ORDER — PHENAZOPYRIDINE HYDROCHLORIDE 200 MG/1
200 TABLET, FILM COATED ORAL 3 TIMES DAILY PRN
Qty: 6 TABLET | Refills: 0 | Status: SHIPPED | OUTPATIENT
Start: 2024-06-05

## 2024-06-05 RX ORDER — SODIUM CHLORIDE, SODIUM LACTATE, POTASSIUM CHLORIDE, CALCIUM CHLORIDE 600; 310; 30; 20 MG/100ML; MG/100ML; MG/100ML; MG/100ML
1000 INJECTION, SOLUTION INTRAVENOUS ONCE
Status: COMPLETED | OUTPATIENT
Start: 2024-06-05 | End: 2024-06-05

## 2024-06-05 RX ORDER — ONDANSETRON 2 MG/ML
4 INJECTION INTRAMUSCULAR; INTRAVENOUS ONCE
Status: COMPLETED | OUTPATIENT
Start: 2024-06-05 | End: 2024-06-05

## 2024-06-05 RX ADMIN — SODIUM CHLORIDE, POTASSIUM CHLORIDE, SODIUM LACTATE AND CALCIUM CHLORIDE 1000 ML: 600; 310; 30; 20 INJECTION, SOLUTION INTRAVENOUS at 11:49

## 2024-06-05 RX ADMIN — MORPHINE SULFATE 4 MG: 4 INJECTION INTRAVENOUS at 11:49

## 2024-06-05 RX ADMIN — ONDANSETRON 4 MG: 2 INJECTION INTRAMUSCULAR; INTRAVENOUS at 11:48

## 2024-06-05 ASSESSMENT — FIBROSIS 4 INDEX: FIB4 SCORE: 0.47

## 2024-06-05 ASSESSMENT — PAIN DESCRIPTION - PAIN TYPE: TYPE: ACUTE PAIN

## 2024-06-05 NOTE — ED PROVIDER NOTES
CHIEF COMPLAINT  Chief Complaint   Patient presents with    Flank Pain     Bilateral flank pain for 2x weeks.     Painful Urination     Pt with UTI like symptoms for 2x weeks       LIMITATION TO HISTORY   Select: none    HPI    Ivory Chávez is a 19 y.o. female who presents to the Emergency Department planing of bilateral flank pain and UTI symptoms.  The patient has cystinuria and is followed by Dr. Muñoz as well as Dr. Harrison she has had a history of bilateral obstructions in the past requiring stenting.  The patient states that started having some increasing or sensations of urinary tract infection over the past 2 weeks she has been on Keflex empirically for that same period of time.  She states that she started actually having fevers chills and worsening pain last night and states that she has not had any urine output since yesterday.  Patient is concerned that she might have other obstruction she is feeling significantly worse so she presents to the emergency department for evaluation.  Patient describes nausea with vomiting denies any diarrhea describes back pain primarily but bilaterally describes some abdominal discomfort but no other symptoms.    OUTSIDE HISTORIAN(S):  Select: None    EXTERNAL RECORDS REVIEWED  Select: Patient is followed by Dr. Muñoz, nephrologist, for cystinuria as well as CKD 1.  Patient has a history of lithotripsy, hydronephrosis, nephrolithiasis, and ureteral stents.  Patient was admitted here at [facility] in August 2023 for significant bilateral hydronephrosis with obstructive kidney stones, worse on the left than the right.  She underwent cystoscopy and bilateral ureteroscopy with laser lithotripsy and bilateral ureteral stent placement on August 27, 2023.       PAST MEDICAL HISTORY  Past Medical History:   Diagnosis Date    Allergy     latex    Anxiety     Cystinuria (HCC)     Cystinuria (HCC)     Nondisplaced bimalleolar fracture of left lower leg, initial encounter  for closed fracture 2007    left lower leg fracture, splinted, no surgery    Psychiatric problem     OCD, anxiety, bipolar    Renal disorder     Kidney stone     Urinary tract infection     pt states she frequent UTIs due to the kidney stones     .    SURGICAL HISTORY  Past Surgical History:   Procedure Laterality Date    CYSTO STENT PLACEMNT PRE SURG Bilateral 8/27/2023    Procedure: CYSTOSCOPY, WITH URETERAL STENT INSERTION OR REMOVAL;  Surgeon: Danny Harrison M.D.;  Location: [facility];  Service: Urology    LASERTRIPSY Bilateral 8/27/2023    Procedure: LITHOTRIPSY, USING LASER;  Surgeon: Danny Harrison M.D.;  Location: [facility];  Service: Urology    MS CYSTOSCOPY,INSERT URETERAL STENT N/A 3/16/2023    Procedure: CYSTOSCOPY;  Surgeon: Nick Ramirez M.D.;  Location: [facility];  Service: Urology    MS CYSTO/URETERO/PYELOSCOPY, DX Left 3/16/2023    Procedure: URETEROSCOPY;  Surgeon: Nick Ramirez M.D.;  Location: [facility];  Service: Urology    MS CYSTOSCOPY,INSERT URETERAL STENT Left 3/7/2022    Procedure: CYSTOSCOPY, WITH URETERAL STENT INSERTION;  Surgeon: Nick Rmairez M.D.;  Location: [facility];  Service: Urology    MS CYSTO/URETERO/PYELOSCOPY, DX Left 3/7/2022    Procedure: URETEROSCOPY;  Surgeon: Nick Ramirez M.D.;  Location: [facility];  Service: Urology    LASERTRIPSY Left 3/7/2022    Procedure: LITHOTRIPSY, USING LASER;  Surgeon: Nick Ramirez M.D.;  Location: [facility];  Service: Urology    MS CYSTOSCOPY,INSERT URETERAL STENT Left 2/24/2022    Procedure: CYSTOSCOPY, WITH URETERAL STENT INSERTION;  Surgeon: Baljeet Yung M.D.;  Location: [facility];  Service: Urology    OTHER ORTHOPEDIC SURGERY  2021    foot - bone shaved    MS CYSTOSCOPY,INSERT URETERAL STENT Left 7/1/2020    Procedure: CYSTOSCOPY, WITH URETERAL STENT INSERTION;  Surgeon: Rafiq Gómez M.D.;  Location: [facility];   Service: Urology    HI CYSTO/URETERO/PYELOSCOPY, DX Left 7/1/2020    Procedure: URETEROSCOPY;  Surgeon: Rafiq Gómez M.D.;  Location: [facility];  Service: Urology    LASERTRIPSY  7/1/2020    Procedure: LITHOTRIPSY, USING LASER;  Surgeon: Rafiq Gómez M.D.;  Location: SUR[facility];  Service: Urology    HI CYSTOSCOPY,INSERT URETERAL STENT Left 8/19/2019    Procedure: CYSTOSCOPY, WITH URETERAL STENT INSERTION;  Surgeon: Rafiq Gómez M.D.;  Location: SUR[facility]ERY [facility];  Service: Urology    LASERTRIPSY Left 8/19/2019    Procedure: LITHOTRIPSY, USING LASER;  Surgeon: Rafiq Gómez M.D.;  Location: [facility];  Service: Urology    URETEROSCOPY Left 11/13/2018    Procedure: URETEROSCOPY;  Surgeon: Francisco Sherman M.D.;  Location: [facility];  Service: Urology    STENT PLACEMENT Left 11/13/2018    Procedure: STENT PLACEMENT;  Surgeon: Francisco Sherman M.D.;  Location: SUR[facility]ERY [facility];  Service: Urology    CYSTOSCOPY  11/13/2018    Procedure: CYSTOSCOPY;  Surgeon: Francisco Sherman M.D.;  Location: [facility];  Service: Urology    LITHOTRIPSY Left 11/13/2018    Procedure: LITHOTRIPSY;  Surgeon: Francisco Sherman M.D.;  Location: SUR[facility]ERY [facility];  Service: Urology    PERCUTANEOUS NEPHROSTOLITHOTOMY Left 11/5/2018    Procedure: PERCUTANEOUS NEPHROSTOLITHOTOMY (PCNL);  Surgeon: Danny Harrison M.D.;  Location: [facility];  Service: Urology    STENT REPLACEMENT Left 11/5/2018    Procedure: STENT REPLACEMENT;  Surgeon: Danny Harrison M.D.;  Location: [facility];  Service: Urology    OTHER  2006    cyst excision to buttock         FAMILY HISTORY  Family History   Problem Relation Age of Onset    No Known Problems Mother     No Known Problems Father     No Known Problems Sister     No Known Problems Sister     No Known Problems Brother     No Known Problems Maternal Grandmother     No Known  Problems Maternal Grandfather     No Known Problems Paternal Grandmother     No Known Problems Paternal Grandfather     Cancer Neg Hx     Diabetes Neg Hx     Heart Disease Neg Hx     Hyperlipidemia Neg Hx     Hypertension Neg Hx     Stroke Neg Hx           SOCIAL HISTORY  Social History     Socioeconomic History    Marital status: Single     Spouse name: Not on file    Number of children: Not on file    Years of education: Not on file    Highest education level: Associate degree: occupational, technical, or vocational program   Occupational History    Not on file   Tobacco Use    Smoking status: Never    Smokeless tobacco: Never   Vaping Use    Vaping status: Former    Substances: Nicotine    Devices: Disposable   Substance and Sexual Activity    Alcohol use: Yes     Comment: socially    Drug use: Never    Sexual activity: Yes     Partners: Male     Birth control/protection: Injection   Other Topics Concern    Behavioral problems No    Interpersonal relationships No    Sad or not enjoying activities No    Suicidal thoughts No    Poor school performance No    Reading difficulties No    Speech difficulties No    Writing difficulties No    Inadequate sleep No    Excessive TV viewing No    Excessive video game use No    Inadequate exercise No    Sports related No    Poor diet No    Family concerns for drug/alcohol abuse No    Poor oral hygiene No    Bike safety No    Family concerns vehicle safety No   Social History Narrative    Not on file     Social Determinants of Health     Financial Resource Strain: Low Risk  (3/24/2023)    Overall Financial Resource Strain (CARDIA)     Difficulty of Paying Living Expenses: Not very hard   Food Insecurity: No Food Insecurity (3/24/2023)    Hunger Vital Sign     Worried About Running Out of Food in the Last Year: Never true     Ran Out of Food in the Last Year: Never true   Transportation Needs: No Transportation Needs (3/24/2023)    PRAPARE - Transportation     Lack of  Transportation (Medical): No     Lack of Transportation (Non-Medical): No   Physical Activity: Sufficiently Active (3/24/2023)    Exercise Vital Sign     Days of Exercise per Week: 4 days     Minutes of Exercise per Session: 60 min   Stress: No Stress Concern Present (3/24/2023)    [employer] [place] [employer] - Occupational Stress Questionnaire     Feeling of Stress : Only a little   Social Connections: Moderately Isolated (3/24/2023)    Social Connection and Isolation Panel [NHANES]     Frequency of Communication with Friends and Family: More than three times a week     Frequency of Social Gatherings with Friends and Family: More than three times a week     Attends Spiritism Services: Never     Active Member of Clubs or Organizations: No     Attends Club or Organization Meetings: Never     Marital Status: Living with partner   Intimate Partner Violence: Not on file   Housing Stability: Low Risk  (3/24/2023)    Housing Stability Vital Sign     Unable to Pay for Housing in the Last Year: No     Number of Places Lived in the Last Year: 2     Unstable Housing in the Last Year: No         CURRENT MEDICATIONS  No current facility-administered medications on file prior to encounter.     Current Outpatient Medications on File Prior to Encounter   Medication Sig Dispense Refill    promethazine-dextromethorphan (PROMETHAZINE-DM) 6.25-15 MG/5ML syrup Take 5 mL by mouth 4 times a day as needed for Cough. 120 mL 0    ondansetron (ZOFRAN ODT) 4 MG TABLET DISPERSIBLE Take 1 Tablet by mouth every 8 hours as needed for Nausea/Vomiting. 6 Tablet 0    EPINEPHrine (EPIPEN) 0.3 MG/0.3ML Solution Auto-injector solution for injection INJECT THE CONTENTS OF THE EPIPEN INTO THE THIGH, HOLD FOR 3 SECONDS AND RELEASE FROM THIGH. 1 Each 5    busPIRone (BUSPAR) 10 MG Tab tablet Take 0.5-1 Tablets by mouth 2 times a day as needed (anxiety/sleep). 180 Tablet 1    ibuprofen (MOTRIN) 400 MG Tab Take 400 mg by oral route.       "omeprazole (PRILOSEC) 40 MG delayed-release capsule       ondansetron (ZOFRAN ODT) 4 MG TABLET DISPERSIBLE Take 1 Tablet by mouth every 6 hours as needed for Nausea/Vomiting. 10 Tablet 0           ALLERGIES  Allergies   Allergen Reactions    Latex Anaphylaxis       PHYSICAL EXAM  VITAL SIGNS:/62   Pulse 64   Temp 36.9 °C (98.5 °F) (Temporal)   Resp 18   Ht 1.6 m (5' 3\")   Wt 52.9 kg (116 lb 10 oz)   SpO2 96%   BMI 20.66 kg/m²     Constitutional:  Well-developed peers to be in discomfort  HENT: Normocephalic, Atraumatic, Bilateral external ears normal.  Eyes:  conjunctiva are normal.   Neck: Supple.  Nontender midline  Cardiovascular: Regular rate and rhythm without murmurs gallops or rubs.   Thorax & Lungs: No respiratory distress. Breathing comfortably. Lungs are clear to auscultation bilaterally, there are no wheezes no rales. Chest wall is nontender.  Abdomen: Soft, non distended, non tender   Skin: Warm, Dry, No erythema,   Back: No tenderness, bilateral CVA tenderness  Musculoskeletal: No clubbing cyanosis or edema good range of motion   Neurologic: Alert & oriented x 3, normal sensation moving all extremities appears normal   Psychiatric: Affect normal, Judgment normal, Mood normal.       DIAGNOSTIC STUDIES / PROCEDURES      LABS  Results for orders placed or performed during the hospital encounter of 06/05/24   Urinalysis, Culture if Indicated    Specimen: Urine, Clean Catch   Result Value Ref Range    Color Yellow     Character Cloudy (A)     Specific Gravity 1.014 <1.035    Ph 8.5 (A) 5.0 - 8.0    Glucose Negative Negative mg/dL    Ketones Negative Negative mg/dL    Protein Negative Negative mg/dL    Bilirubin Negative Negative    Urobilinogen, Urine 0.2 Negative    Nitrite Negative Negative    Leukocyte Esterase Small (A) Negative    Occult Blood Negative Negative    Micro Urine Req Microscopic    BETA-HCG QUALITATIVE URINE   Result Value Ref Range    Beta-Hcg Urine Negative Negative   HCG " Qual Serum   Result Value Ref Range    Beta-Hcg Qualitative Serum Negative Negative   CBC WITH DIFFERENTIAL   Result Value Ref Range    WBC 8.3 4.8 - 10.8 K/uL    RBC 4.66 4.20 - 5.40 M/uL    Hemoglobin 15.1 12.0 - 16.0 g/dL    Hematocrit 43.5 37.0 - 47.0 %    MCV 93.3 81.4 - 97.8 fL    MCH 32.4 27.0 - 33.0 pg    MCHC 34.7 32.2 - 35.5 g/dL    RDW 43.9 35.9 - 50.0 fL    Platelet Count 209 164 - 446 K/uL    MPV 8.4 (L) 9.0 - 12.9 fL    Neutrophils-Polys 75.20 (H) 44.00 - 72.00 %    Lymphocytes 14.90 (L) 22.00 - 41.00 %    Monocytes 7.60 0.00 - 13.40 %    Eosinophils 1.60 0.00 - 6.90 %    Basophils 0.50 0.00 - 1.80 %    Immature Granulocytes 0.20 0.00 - 0.90 %    Nucleated RBC 0.00 0.00 - 0.20 /100 WBC    Neutrophils (Absolute) 6.26 1.82 - 7.42 K/uL    Lymphs (Absolute) 1.24 1.00 - 4.80 K/uL    Monos (Absolute) 0.63 0.00 - 0.85 K/uL    Eos (Absolute) 0.13 0.00 - 0.51 K/uL    Baso (Absolute) 0.04 0.00 - 0.12 K/uL    Immature Granulocytes (abs) 0.02 0.00 - 0.11 K/uL    NRBC (Absolute) 0.00 K/uL   COMP METABOLIC PANEL   Result Value Ref Range    Sodium 136 135 - 145 mmol/L    Potassium 3.9 3.6 - 5.5 mmol/L    Chloride 103 96 - 112 mmol/L    Co2 20 20 - 33 mmol/L    Anion Gap 13.0 7.0 - 16.0    Glucose 89 65 - 99 mg/dL    Bun 10 8 - 22 mg/dL    Creatinine 0.62 0.50 - 1.40 mg/dL    Calcium 9.4 8.5 - 10.5 mg/dL    Correct Calcium 9.0 8.5 - 10.5 mg/dL    AST(SGOT) 15 12 - 45 U/L    ALT(SGPT) 13 2 - 50 U/L    Alkaline Phosphatase 63 30 - 99 U/L    Total Bilirubin 0.9 0.1 - 1.5 mg/dL    Albumin 4.5 3.2 - 4.9 g/dL    Total Protein 7.0 6.0 - 8.2 g/dL    Globulin 2.5 1.9 - 3.5 g/dL    A-G Ratio 1.8 g/dL   URINE MICROSCOPIC (W/UA)   Result Value Ref Range    WBC 0-2 /hpf    RBC 0-2 /hpf    Bacteria Few (A) None /hpf    Epithelial Cells Few /hpf    Hyaline Cast 0-2 /lpf   ESTIMATED GFR   Result Value Ref Range    GFR (CKD-EPI) 130 >60 mL/min/1.73 m 2           RADIOLOGY  I have independently interpreted the diagnostic imaging  associated with this visit and am waiting the final reading from the radiologist.   My preliminary interpretation is as follows: No signs of hydronephrosis on CT scan    Radiologist interpretation:  CT-RENAL COLIC EVALUATION(A/P W/O)   Final Result      Small bilateral nonobstructive renal calculi      Negative for urolithiasis or hydronephrosis              COURSE & MEDICAL DECISION MAKING    ED COURSE:    ED Observation Status? No, the patient does not qualify for observation    INTERVENTIONS BY ME:  Medications   lactated ringers (LR) bolus (0 mL Intravenous Stopped 6/5/24 1259)   morphine 4 MG/ML injection 4 mg (4 mg Intravenous Given 6/5/24 1149)   ondansetron (Zofran) syringe/vial injection 4 mg (4 mg Intravenous Given 6/5/24 1148)         Rechecks patient much improved after fluids as well as pain medications      INITIAL ASSESSMENT, COURSE AND PLAN  Care Narrative: Patient presents emerged part for evaluation.  Clinically the patient has having severe pains or concerns are for bilateral nephroureterolithiasis.  Also for urosepsis.  IV was established.  Bladder scan was done at bedside and she had over 600 cc in her bladder.  Patient got 400 out but still had 200 residual and upon recheck still had another 600 output still at 200 residual and she still retaining about 200 urine.  At this point there is no signs of significant white blood cells in the urine.  This is a noncath urine with few bacteria and few epithelial cells.  I will send this urine for culture and sensitivity.  I do feel that her symptoms are most likely related to her urinary retention.  I did offer to have a Moreno catheter and leg bag for her but at this point she is very adamant does not wish to have a Moreno catheter.  I do want her to follow-up with Dr. Harrison who she is seen for urology in the past.  I will start her on Pyridium to see if that helps with some of her symptomatology she does have oxybutynin at home as well.  I have  recommended for her to follow-up with urology and return if any symptoms worsen.              HYDRATION: Based on the patient's presentation of dehydration,  the patient was given IV fluids. IV Hydration was used because oral hydration is unable to be done due to the patient's symptoms. Upon recheck following hydration, the patient was dehydration.     ADDITIONAL PROBLEM LIST  Cystinuria  DISPOSITION AND DISCUSSIONS  I have discussed management of the patient with the following physicians and LAVINIA's: None    Escalation of care considered, and ultimately not performed: None    Barriers to care at this time, including but not limited to: None.     Decision tools and prescription drugs considered including, but not limited to: Prescription as below.  Recommended for her to use her oxybutynin as needed..    FINAL DIAGNOSIS  1. Urinary retention           The patient will return for new or worsening symptoms and is stable at the time of discharge.    The patient is referred to a primary physician for blood pressure management, diabetic screening, and for all other preventative health concerns.    I reviewed prescription monitoring program for patient's narcotic use before prescribing a scheduled drug.The patient will not drink alcohol nor drive with prescribed medications        DISPOSITION:  Patient will be discharged home in stable condition.    FOLLOW UP:  Danny Harrison M.D.  [address] [facility] [address]  [phone]    Schedule an appointment as soon as possible for a visit         OUTPATIENT MEDICATIONS:  New Prescriptions    PHENAZOPYRIDINE (PYRIDIUM) 200 MG TAB    Take 1 Tablet by mouth 3 times a day as needed for Moderate Pain.                      Electronically signed by: Efrem Wesley M.D.,3:12 PM 06/05/24

## 2024-06-05 NOTE — LETTER
6/11/2024               Ivory Grkashif  [address] [facility] [address]        Dear Ivory (MR#[id number])    As we have been unable to contact you by phone, this letter is sent in regards to your recent visit to the [facility] Emergency Department on 6/5/2024. During the visit, tests were performed to assist the physician in a medical diagnosis. A review of those tests requires that we notify you of the following:    Your urine culture and sensitivity was POSITIVE for a bacteria called Staphylococcus epidermidis, and further treatment may be necessary. IF YOU ARE NOT FEELING BETTER PLEASE CONTACT ME AS SOON AS POSSIBLE AT THE NUMBER BELOW.       Thank you for your cooperation in the matter.    Sincerely,  ED Culture Follow-Up Staff  Yandel Trejo, Josue    [facility], Emergency Department  [address] [place] [id number] [phone] (ED Culture Line)

## 2024-06-05 NOTE — ED TRIAGE NOTES
Ivory Yumiko Saira  19 y.o. female  Chief Complaint   Patient presents with    Flank Pain     Bilateral flank pain for 2x weeks.     Painful Urination     Pt with UTI like symptoms for 2x weeks       Vitals:    06/05/24 1013   BP: (!) 142/96   Pulse: 86   Resp: 18   Temp: 36.9 °C (98.5 °F)   SpO2: 96%       Patient educated on triage process and encouraged to alert staff of any changes in condition.

## 2024-06-05 NOTE — ED NOTES
Bladder scan completed, significant other at bedside. First scan showed 581ml    Bladder scan repeated for confirmation. 663ml residual shown on second scan.

## 2024-06-12 NOTE — PROGRESS NOTES
ED Positive Culture Follow-up/Notification Note:    Date: 6/11/24     Patient seen in the ED on 6/5/2024 for flank pain and painful urination.   1. Urinary retention       Discharge Medication List as of 6/5/2024  3:08 PM        START taking these medications    Details   phenazopyridine (PYRIDIUM) 200 MG Tab Take 1 Tablet by mouth 3 times a day as needed for Moderate Pain., Disp-6 Tablet, R-0, Normal             Allergies: Latex     Vitals:    06/05/24 1404 06/05/24 1501 06/05/24 1502 06/05/24 1503   BP:   124/62    Pulse: 62 69 66 64   Resp:   18    Temp:   36.9 °C (98.5 °F)    TempSrc:   Temporal    SpO2: 96% 95% 96% 96%   Weight:       Height:           Final cultures:   Results       Procedure Component Value Units Date/Time    URINE CULTURE(NEW) [[id number]]  (Abnormal)  (Susceptibility) Collected: 06/05/24 1145    Order Status: Completed Specimen: Urine Updated: 06/09/24 1013     Significant Indicator POS     Source UR     Site -     Culture Result -      Staphylococcus epidermidis  >100,000 cfu/mL      Narrative:      Indication for culture:->Patient WITHOUT an indwelling Moreno    Susceptibility       Staphylococcus epidermidis (1)       Antibiotic Interpretation Microscan   Method Status    Ampicillin  [*]  Resistant 8 mcg/mL MIRTHA Final    Amoxicillin/CA  [*]  Sensitive <=4/2 mcg/mL MIRTHA Final    Ceftriaxone  [*]  Sensitive <=4 mcg/mL MIRTHA Final    Cephalothin  [*]  Sensitive <=8 mcg/mL MIRTHA Final    Cefazolin Sensitive <=8 mcg/mL MIRTHA Final    Ciprofloxacin  [*]  Sensitive <=1 mcg/mL MIRTHA Final    Cefepime Sensitive <=4 mcg/mL MIRTHA Final    Daptomycin Sensitive <=0.5 mcg/mL MIRTHA Final    Nitrofurantoin Sensitive <=32 mcg/mL MIRTHA Final    Gentamicin  [*]  Sensitive <=4 mcg/mL MIRTHA Final    Ampicillin/sulbactam Sensitive <=8/4 mcg/mL MIRTHA Final    Imipenem  [*]  Sensitive <=4 mcg/mL MIRTHA Final    Vancomycin  [*]  Sensitive 2 mcg/mL MIRTHA Final    Levofloxacin  [*]  Sensitive <=1 mcg/mL MIRTHA Final    Linezolid Sensitive 2  mcg/mL MIRTHA Final    Oxacillin  [*]  Sensitive <=0.25 mcg/mL MIRTHA Final    Pip/Tazobactam  [*]  Sensitive <=8 mcg/mL MIRTHA Final    Rifampin  [*]  Sensitive <=1 mcg/mL MIRTHA Final    Synercid  [*]  Sensitive <=0.5 mcg/mL MIRTHA Final    Trimeth/Sulfa Sensitive <=0.5/9.5 mcg/mL MIRTHA Final    Tetracycline Sensitive <=4 mcg/mL MIRTHA Final    Tigecycline  [*]  Sensitive <=0.25 mcg/mL MIRTHA Final               [*]  Suppressed Antibiotic                   Urinalysis, Culture if Indicated [[id number]]  (Abnormal) Collected: 06/05/24 1145    Order Status: Completed Specimen: Urine, Clean Catch Updated: 06/05/24 1210     Color Yellow     Character Cloudy     Specific Gravity 1.014     Ph 8.5     Glucose Negative mg/dL      Ketones Negative mg/dL      Protein Negative mg/dL      Bilirubin Negative     Urobilinogen, Urine 0.2     Nitrite Negative     Leukocyte Esterase Small     Occult Blood Negative     Micro Urine Req Microscopic            Plan:   Patient had a positive culture for S. Epidermidis which may be a contaminant or could be contributing to symptoms. Sent Pluck message to patient to call us back so that we can see if symptoms have improved since being seen in the ER and determine treatment plan moving forward. At initial evaluation, physician suspected that symptoms were primarily related to urinary retention. Patient declined grossman catheter placement and is supposed to follow-up with urology.     Yandel Trejo, PharmD

## 2024-07-16 DIAGNOSIS — R10.9 RIGHT FLANK PAIN: ICD-10-CM

## 2024-07-20 ENCOUNTER — HOSPITAL ENCOUNTER (OUTPATIENT)
Dept: RADIOLOGY | Facility: MEDICAL CENTER | Age: 19
End: 2024-07-20
Attending: PHYSICIAN ASSISTANT

## 2024-07-20 DIAGNOSIS — R10.9 RIGHT FLANK PAIN: ICD-10-CM

## 2024-07-20 PROCEDURE — 76775 US EXAM ABDO BACK WALL LIM: CPT

## 2024-07-22 ENCOUNTER — ANESTHESIA (OUTPATIENT)
Dept: SURGERY | Facility: MEDICAL CENTER | Age: 19
DRG: 660 | End: 2024-07-22

## 2024-07-22 ENCOUNTER — APPOINTMENT (OUTPATIENT)
Dept: RADIOLOGY | Facility: MEDICAL CENTER | Age: 19
DRG: 660 | End: 2024-07-22
Attending: STUDENT IN AN ORGANIZED HEALTH CARE EDUCATION/TRAINING PROGRAM

## 2024-07-22 ENCOUNTER — HOSPITAL ENCOUNTER (INPATIENT)
Facility: MEDICAL CENTER | Age: 19
LOS: 2 days | DRG: 660 | End: 2024-07-24
Attending: EMERGENCY MEDICINE | Admitting: STUDENT IN AN ORGANIZED HEALTH CARE EDUCATION/TRAINING PROGRAM

## 2024-07-22 ENCOUNTER — APPOINTMENT (OUTPATIENT)
Dept: RADIOLOGY | Facility: MEDICAL CENTER | Age: 19
DRG: 660 | End: 2024-07-22
Attending: EMERGENCY MEDICINE

## 2024-07-22 ENCOUNTER — ANESTHESIA EVENT (OUTPATIENT)
Dept: SURGERY | Facility: MEDICAL CENTER | Age: 19
DRG: 660 | End: 2024-07-22

## 2024-07-22 DIAGNOSIS — N13.30 HYDRONEPHROSIS, UNSPECIFIED HYDRONEPHROSIS TYPE: ICD-10-CM

## 2024-07-22 DIAGNOSIS — R11.10 VOMITING, UNSPECIFIED VOMITING TYPE, UNSPECIFIED WHETHER NAUSEA PRESENT: ICD-10-CM

## 2024-07-22 DIAGNOSIS — R10.9 FLANK PAIN: ICD-10-CM

## 2024-07-22 DIAGNOSIS — E72.01 CYSTINURIA (HCC): ICD-10-CM

## 2024-07-22 DIAGNOSIS — N20.1 URETEROLITHIASIS: ICD-10-CM

## 2024-07-22 DIAGNOSIS — Z91.89 AT RISK FOR CONSTIPATION: ICD-10-CM

## 2024-07-22 DIAGNOSIS — N39.0 ACUTE UTI: ICD-10-CM

## 2024-07-22 DIAGNOSIS — R06.00 DYSPNEA, UNSPECIFIED TYPE: ICD-10-CM

## 2024-07-22 DIAGNOSIS — Z78.9 ALCOHOL USE: ICD-10-CM

## 2024-07-22 PROBLEM — N13.9 OBSTRUCTIVE UROPATHY: Status: ACTIVE | Noted: 2024-07-22

## 2024-07-22 PROBLEM — E87.20 METABOLIC ACIDOSIS: Status: ACTIVE | Noted: 2024-07-22

## 2024-07-22 PROBLEM — N12 PYELONEPHRITIS: Status: ACTIVE | Noted: 2024-07-22

## 2024-07-22 LAB
ALBUMIN SERPL BCP-MCNC: 4.3 G/DL (ref 3.2–4.9)
ALBUMIN/GLOB SERPL: 1.7 G/DL
ALP SERPL-CCNC: 59 U/L (ref 30–99)
ALT SERPL-CCNC: 13 U/L (ref 2–50)
ANION GAP SERPL CALC-SCNC: 13 MMOL/L (ref 7–16)
APPEARANCE UR: CLEAR
AST SERPL-CCNC: 17 U/L (ref 12–45)
BACTERIA #/AREA URNS HPF: ABNORMAL /HPF
BASOPHILS # BLD AUTO: 0.4 % (ref 0–1.8)
BASOPHILS # BLD: 0.03 K/UL (ref 0–0.12)
BILIRUB SERPL-MCNC: 0.6 MG/DL (ref 0.1–1.5)
BILIRUB UR QL STRIP.AUTO: NEGATIVE
BUN SERPL-MCNC: 11 MG/DL (ref 8–22)
CALCIUM ALBUM COR SERPL-MCNC: 8.8 MG/DL (ref 8.5–10.5)
CALCIUM SERPL-MCNC: 9 MG/DL (ref 8.5–10.5)
CHLORIDE SERPL-SCNC: 106 MMOL/L (ref 96–112)
CO2 SERPL-SCNC: 19 MMOL/L (ref 20–33)
COLOR UR: YELLOW
CREAT SERPL-MCNC: 0.74 MG/DL (ref 0.5–1.4)
EOSINOPHIL # BLD AUTO: 0.09 K/UL (ref 0–0.51)
EOSINOPHIL NFR BLD: 1.1 % (ref 0–6.9)
EPI CELLS #/AREA URNS HPF: ABNORMAL /HPF
ERYTHROCYTE [DISTWIDTH] IN BLOOD BY AUTOMATED COUNT: 43.9 FL (ref 35.9–50)
GFR SERPLBLD CREATININE-BSD FMLA CKD-EPI: 118 ML/MIN/1.73 M 2
GLOBULIN SER CALC-MCNC: 2.6 G/DL (ref 1.9–3.5)
GLUCOSE SERPL-MCNC: 85 MG/DL (ref 65–99)
GLUCOSE UR STRIP.AUTO-MCNC: NEGATIVE MG/DL
HCG SERPL QL: NEGATIVE
HCT VFR BLD AUTO: 41.8 % (ref 37–47)
HGB BLD-MCNC: 14.2 G/DL (ref 12–16)
HYALINE CASTS #/AREA URNS LPF: ABNORMAL /LPF
IMM GRANULOCYTES # BLD AUTO: 0.02 K/UL (ref 0–0.11)
IMM GRANULOCYTES NFR BLD AUTO: 0.2 % (ref 0–0.9)
KETONES UR STRIP.AUTO-MCNC: NEGATIVE MG/DL
LEUKOCYTE ESTERASE UR QL STRIP.AUTO: ABNORMAL
LIPASE SERPL-CCNC: 16 U/L (ref 11–82)
LYMPHOCYTES # BLD AUTO: 1.64 K/UL (ref 1–4.8)
LYMPHOCYTES NFR BLD: 20 % (ref 22–41)
MCH RBC QN AUTO: 32.4 PG (ref 27–33)
MCHC RBC AUTO-ENTMCNC: 34 G/DL (ref 32.2–35.5)
MCV RBC AUTO: 95.4 FL (ref 81.4–97.8)
MICRO URNS: ABNORMAL
MONOCYTES # BLD AUTO: 0.62 K/UL (ref 0–0.85)
MONOCYTES NFR BLD AUTO: 7.6 % (ref 0–13.4)
NEUTROPHILS # BLD AUTO: 5.78 K/UL (ref 1.82–7.42)
NEUTROPHILS NFR BLD: 70.7 % (ref 44–72)
NITRITE UR QL STRIP.AUTO: NEGATIVE
NRBC # BLD AUTO: 0 K/UL
NRBC BLD-RTO: 0 /100 WBC (ref 0–0.2)
PH UR STRIP.AUTO: 8.5 [PH] (ref 5–8)
PLATELET # BLD AUTO: 221 K/UL (ref 164–446)
PMV BLD AUTO: 8.6 FL (ref 9–12.9)
POTASSIUM SERPL-SCNC: 4 MMOL/L (ref 3.6–5.5)
PROT SERPL-MCNC: 6.9 G/DL (ref 6–8.2)
PROT UR QL STRIP: 30 MG/DL
RBC # BLD AUTO: 4.38 M/UL (ref 4.2–5.4)
RBC # URNS HPF: ABNORMAL /HPF
RBC UR QL AUTO: ABNORMAL
SODIUM SERPL-SCNC: 138 MMOL/L (ref 135–145)
SP GR UR STRIP.AUTO: 1.02
UROBILINOGEN UR STRIP.AUTO-MCNC: 0.2 MG/DL
WBC # BLD AUTO: 8.2 K/UL (ref 4.8–10.8)
WBC #/AREA URNS HPF: ABNORMAL /HPF

## 2024-07-22 PROCEDURE — 770006 HCHG ROOM/CARE - MED/SURG/GYN SEMI*

## 2024-07-22 PROCEDURE — 81001 URINALYSIS AUTO W/SCOPE: CPT

## 2024-07-22 PROCEDURE — 99291 CRITICAL CARE FIRST HOUR: CPT | Mod: EDC

## 2024-07-22 PROCEDURE — 700105 HCHG RX REV CODE 258

## 2024-07-22 PROCEDURE — A9270 NON-COVERED ITEM OR SERVICE: HCPCS | Performed by: ANESTHESIOLOGY

## 2024-07-22 PROCEDURE — 700105 HCHG RX REV CODE 258: Performed by: STUDENT IN AN ORGANIZED HEALTH CARE EDUCATION/TRAINING PROGRAM

## 2024-07-22 PROCEDURE — 700111 HCHG RX REV CODE 636 W/ 250 OVERRIDE (IP): Mod: JZ | Performed by: STUDENT IN AN ORGANIZED HEALTH CARE EDUCATION/TRAINING PROGRAM

## 2024-07-22 PROCEDURE — BT1D1ZZ FLUOROSCOPY OF RIGHT KIDNEY, URETER AND BLADDER USING LOW OSMOLAR CONTRAST: ICD-10-PCS | Performed by: STUDENT IN AN ORGANIZED HEALTH CARE EDUCATION/TRAINING PROGRAM

## 2024-07-22 PROCEDURE — A9270 NON-COVERED ITEM OR SERVICE: HCPCS | Performed by: STUDENT IN AN ORGANIZED HEALTH CARE EDUCATION/TRAINING PROGRAM

## 2024-07-22 PROCEDURE — 700111 HCHG RX REV CODE 636 W/ 250 OVERRIDE (IP): Mod: JZ | Performed by: ANESTHESIOLOGY

## 2024-07-22 PROCEDURE — 160036 HCHG PACU - EA ADDL 30 MINS PHASE I: Performed by: STUDENT IN AN ORGANIZED HEALTH CARE EDUCATION/TRAINING PROGRAM

## 2024-07-22 PROCEDURE — 700117 HCHG RX CONTRAST REV CODE 255: Performed by: STUDENT IN AN ORGANIZED HEALTH CARE EDUCATION/TRAINING PROGRAM

## 2024-07-22 PROCEDURE — 84703 CHORIONIC GONADOTROPIN ASSAY: CPT

## 2024-07-22 PROCEDURE — C1758 CATHETER, URETERAL: HCPCS | Performed by: STUDENT IN AN ORGANIZED HEALTH CARE EDUCATION/TRAINING PROGRAM

## 2024-07-22 PROCEDURE — 700102 HCHG RX REV CODE 250 W/ 637 OVERRIDE(OP): Performed by: STUDENT IN AN ORGANIZED HEALTH CARE EDUCATION/TRAINING PROGRAM

## 2024-07-22 PROCEDURE — 700102 HCHG RX REV CODE 250 W/ 637 OVERRIDE(OP): Performed by: ANESTHESIOLOGY

## 2024-07-22 PROCEDURE — 74018 RADEX ABDOMEN 1 VIEW: CPT

## 2024-07-22 PROCEDURE — 80053 COMPREHEN METABOLIC PANEL: CPT

## 2024-07-22 PROCEDURE — 160048 HCHG OR STATISTICAL LEVEL 1-5: Performed by: STUDENT IN AN ORGANIZED HEALTH CARE EDUCATION/TRAINING PROGRAM

## 2024-07-22 PROCEDURE — 96375 TX/PRO/DX INJ NEW DRUG ADDON: CPT | Mod: EDC

## 2024-07-22 PROCEDURE — 96374 THER/PROPH/DIAG INJ IV PUSH: CPT | Mod: EDC

## 2024-07-22 PROCEDURE — 99223 1ST HOSP IP/OBS HIGH 75: CPT | Performed by: STUDENT IN AN ORGANIZED HEALTH CARE EDUCATION/TRAINING PROGRAM

## 2024-07-22 PROCEDURE — 160002 HCHG RECOVERY MINUTES (STAT): Performed by: STUDENT IN AN ORGANIZED HEALTH CARE EDUCATION/TRAINING PROGRAM

## 2024-07-22 PROCEDURE — 83690 ASSAY OF LIPASE: CPT

## 2024-07-22 PROCEDURE — 160039 HCHG SURGERY MINUTES - EA ADDL 1 MIN LEVEL 3: Performed by: STUDENT IN AN ORGANIZED HEALTH CARE EDUCATION/TRAINING PROGRAM

## 2024-07-22 PROCEDURE — 85025 COMPLETE CBC W/AUTO DIFF WBC: CPT

## 2024-07-22 PROCEDURE — 0T768DZ DILATION OF RIGHT URETER WITH INTRALUMINAL DEVICE, VIA NATURAL OR ARTIFICIAL OPENING ENDOSCOPIC: ICD-10-PCS | Performed by: STUDENT IN AN ORGANIZED HEALTH CARE EDUCATION/TRAINING PROGRAM

## 2024-07-22 PROCEDURE — 96376 TX/PRO/DX INJ SAME DRUG ADON: CPT | Mod: EDC

## 2024-07-22 PROCEDURE — 36415 COLL VENOUS BLD VENIPUNCTURE: CPT | Mod: EDC

## 2024-07-22 PROCEDURE — 700101 HCHG RX REV CODE 250: Performed by: ANESTHESIOLOGY

## 2024-07-22 PROCEDURE — 160028 HCHG SURGERY MINUTES - 1ST 30 MINS LEVEL 3: Performed by: STUDENT IN AN ORGANIZED HEALTH CARE EDUCATION/TRAINING PROGRAM

## 2024-07-22 PROCEDURE — C1769 GUIDE WIRE: HCPCS | Performed by: STUDENT IN AN ORGANIZED HEALTH CARE EDUCATION/TRAINING PROGRAM

## 2024-07-22 PROCEDURE — 160035 HCHG PACU - 1ST 60 MINS PHASE I: Performed by: STUDENT IN AN ORGANIZED HEALTH CARE EDUCATION/TRAINING PROGRAM

## 2024-07-22 PROCEDURE — C2617 STENT, NON-COR, TEM W/O DEL: HCPCS | Performed by: STUDENT IN AN ORGANIZED HEALTH CARE EDUCATION/TRAINING PROGRAM

## 2024-07-22 PROCEDURE — 160009 HCHG ANES TIME/MIN: Performed by: STUDENT IN AN ORGANIZED HEALTH CARE EDUCATION/TRAINING PROGRAM

## 2024-07-22 PROCEDURE — 700111 HCHG RX REV CODE 636 W/ 250 OVERRIDE (IP): Mod: JZ | Performed by: EMERGENCY MEDICINE

## 2024-07-22 PROCEDURE — 74176 CT ABD & PELVIS W/O CONTRAST: CPT

## 2024-07-22 DEVICE — STENT UROLOGICAL POLARIS 6X24  ULTRA: Type: IMPLANTABLE DEVICE | Site: PELVIS | Status: FUNCTIONAL

## 2024-07-22 RX ORDER — ACETAMINOPHEN 500 MG
1000 TABLET ORAL EVERY 6 HOURS
Status: DISCONTINUED | OUTPATIENT
Start: 2024-07-22 | End: 2024-07-24 | Stop reason: HOSPADM

## 2024-07-22 RX ORDER — CEFAZOLIN SODIUM 1 G/3ML
INJECTION, POWDER, FOR SOLUTION INTRAMUSCULAR; INTRAVENOUS PRN
Status: DISCONTINUED | OUTPATIENT
Start: 2024-07-22 | End: 2024-07-22 | Stop reason: SURG

## 2024-07-22 RX ORDER — POLYETHYLENE GLYCOL 3350 17 G/17G
1 POWDER, FOR SOLUTION ORAL
Status: DISCONTINUED | OUTPATIENT
Start: 2024-07-22 | End: 2024-07-24 | Stop reason: HOSPADM

## 2024-07-22 RX ORDER — HYDROMORPHONE HYDROCHLORIDE 1 MG/ML
0.2 INJECTION, SOLUTION INTRAMUSCULAR; INTRAVENOUS; SUBCUTANEOUS
Status: DISCONTINUED | OUTPATIENT
Start: 2024-07-22 | End: 2024-07-22 | Stop reason: HOSPADM

## 2024-07-22 RX ORDER — PROMETHAZINE HYDROCHLORIDE 25 MG/1
12.5-25 SUPPOSITORY RECTAL EVERY 4 HOURS PRN
Status: DISCONTINUED | OUTPATIENT
Start: 2024-07-22 | End: 2024-07-24 | Stop reason: HOSPADM

## 2024-07-22 RX ORDER — LIDOCAINE HYDROCHLORIDE 20 MG/ML
INJECTION, SOLUTION EPIDURAL; INFILTRATION; INTRACAUDAL; PERINEURAL PRN
Status: DISCONTINUED | OUTPATIENT
Start: 2024-07-22 | End: 2024-07-22 | Stop reason: SURG

## 2024-07-22 RX ORDER — SODIUM CHLORIDE, SODIUM LACTATE, POTASSIUM CHLORIDE, CALCIUM CHLORIDE 600; 310; 30; 20 MG/100ML; MG/100ML; MG/100ML; MG/100ML
INJECTION, SOLUTION INTRAVENOUS CONTINUOUS
Status: DISCONTINUED | OUTPATIENT
Start: 2024-07-22 | End: 2024-07-22 | Stop reason: HOSPADM

## 2024-07-22 RX ORDER — ONDANSETRON 2 MG/ML
INJECTION INTRAMUSCULAR; INTRAVENOUS PRN
Status: DISCONTINUED | OUTPATIENT
Start: 2024-07-22 | End: 2024-07-22 | Stop reason: SURG

## 2024-07-22 RX ORDER — ONDANSETRON 4 MG/1
4 TABLET, ORALLY DISINTEGRATING ORAL EVERY 4 HOURS PRN
Status: DISCONTINUED | OUTPATIENT
Start: 2024-07-22 | End: 2024-07-24 | Stop reason: HOSPADM

## 2024-07-22 RX ORDER — ONDANSETRON 2 MG/ML
4 INJECTION INTRAMUSCULAR; INTRAVENOUS ONCE
Status: COMPLETED | OUTPATIENT
Start: 2024-07-22 | End: 2024-07-22

## 2024-07-22 RX ORDER — CETIRIZINE HYDROCHLORIDE 10 MG/1
10 TABLET ORAL DAILY
Status: DISCONTINUED | OUTPATIENT
Start: 2024-07-22 | End: 2024-07-24 | Stop reason: HOSPADM

## 2024-07-22 RX ORDER — DEXAMETHASONE SODIUM PHOSPHATE 4 MG/ML
INJECTION, SOLUTION INTRA-ARTICULAR; INTRALESIONAL; INTRAMUSCULAR; INTRAVENOUS; SOFT TISSUE PRN
Status: DISCONTINUED | OUTPATIENT
Start: 2024-07-22 | End: 2024-07-22 | Stop reason: SURG

## 2024-07-22 RX ORDER — KETOROLAC TROMETHAMINE 15 MG/ML
15 INJECTION, SOLUTION INTRAMUSCULAR; INTRAVENOUS ONCE
Status: COMPLETED | OUTPATIENT
Start: 2024-07-22 | End: 2024-07-22

## 2024-07-22 RX ORDER — AMOXICILLIN 250 MG
2 CAPSULE ORAL EVERY EVENING
Status: DISCONTINUED | OUTPATIENT
Start: 2024-07-22 | End: 2024-07-24 | Stop reason: HOSPADM

## 2024-07-22 RX ORDER — ACETAMINOPHEN 500 MG
500-1000 TABLET ORAL EVERY 6 HOURS PRN
COMMUNITY

## 2024-07-22 RX ORDER — SODIUM CHLORIDE, SODIUM LACTATE, POTASSIUM CHLORIDE, CALCIUM CHLORIDE 600; 310; 30; 20 MG/100ML; MG/100ML; MG/100ML; MG/100ML
INJECTION, SOLUTION INTRAVENOUS CONTINUOUS
Status: DISCONTINUED | OUTPATIENT
Start: 2024-07-22 | End: 2024-07-24 | Stop reason: HOSPADM

## 2024-07-22 RX ORDER — IPRATROPIUM BROMIDE AND ALBUTEROL SULFATE 2.5; .5 MG/3ML; MG/3ML
3 SOLUTION RESPIRATORY (INHALATION)
Status: DISCONTINUED | OUTPATIENT
Start: 2024-07-22 | End: 2024-07-22 | Stop reason: HOSPADM

## 2024-07-22 RX ORDER — KETOROLAC TROMETHAMINE 15 MG/ML
INJECTION, SOLUTION INTRAMUSCULAR; INTRAVENOUS PRN
Status: DISCONTINUED | OUTPATIENT
Start: 2024-07-22 | End: 2024-07-22 | Stop reason: SURG

## 2024-07-22 RX ORDER — HYDROMORPHONE HYDROCHLORIDE 1 MG/ML
0.5 INJECTION, SOLUTION INTRAMUSCULAR; INTRAVENOUS; SUBCUTANEOUS ONCE
Status: COMPLETED | OUTPATIENT
Start: 2024-07-22 | End: 2024-07-22

## 2024-07-22 RX ORDER — ACETAMINOPHEN 500 MG
1000 TABLET ORAL EVERY 6 HOURS PRN
Status: DISCONTINUED | OUTPATIENT
Start: 2024-07-27 | End: 2024-07-24 | Stop reason: HOSPADM

## 2024-07-22 RX ORDER — LABETALOL HYDROCHLORIDE 5 MG/ML
10 INJECTION, SOLUTION INTRAVENOUS EVERY 4 HOURS PRN
Status: DISCONTINUED | OUTPATIENT
Start: 2024-07-22 | End: 2024-07-24 | Stop reason: HOSPADM

## 2024-07-22 RX ORDER — SODIUM CHLORIDE 9 MG/ML
1000 INJECTION, SOLUTION INTRAVENOUS ONCE
Status: COMPLETED | OUTPATIENT
Start: 2024-07-22 | End: 2024-07-22

## 2024-07-22 RX ORDER — EPHEDRINE SULFATE 50 MG/ML
INJECTION, SOLUTION INTRAVENOUS PRN
Status: DISCONTINUED | OUTPATIENT
Start: 2024-07-22 | End: 2024-07-22 | Stop reason: SURG

## 2024-07-22 RX ORDER — HALOPERIDOL 5 MG/ML
1 INJECTION INTRAMUSCULAR
Status: DISCONTINUED | OUTPATIENT
Start: 2024-07-22 | End: 2024-07-22 | Stop reason: HOSPADM

## 2024-07-22 RX ORDER — ACETAMINOPHEN 325 MG/1
650 TABLET ORAL EVERY 6 HOURS PRN
Status: DISCONTINUED | OUTPATIENT
Start: 2024-07-22 | End: 2024-07-22

## 2024-07-22 RX ORDER — HYDRALAZINE HYDROCHLORIDE 20 MG/ML
5 INJECTION INTRAMUSCULAR; INTRAVENOUS
Status: DISCONTINUED | OUTPATIENT
Start: 2024-07-22 | End: 2024-07-22 | Stop reason: HOSPADM

## 2024-07-22 RX ORDER — ONDANSETRON 2 MG/ML
4 INJECTION INTRAMUSCULAR; INTRAVENOUS EVERY 4 HOURS PRN
Status: DISCONTINUED | OUTPATIENT
Start: 2024-07-22 | End: 2024-07-24 | Stop reason: HOSPADM

## 2024-07-22 RX ORDER — MORPHINE SULFATE 4 MG/ML
2 INJECTION INTRAVENOUS EVERY 4 HOURS PRN
Status: DISCONTINUED | OUTPATIENT
Start: 2024-07-22 | End: 2024-07-23

## 2024-07-22 RX ORDER — IPRATROPIUM BROMIDE AND ALBUTEROL SULFATE 2.5; .5 MG/3ML; MG/3ML
3 SOLUTION RESPIRATORY (INHALATION)
Status: DISCONTINUED | OUTPATIENT
Start: 2024-07-22 | End: 2024-07-24 | Stop reason: HOSPADM

## 2024-07-22 RX ORDER — KETOROLAC TROMETHAMINE 15 MG/ML
15 INJECTION, SOLUTION INTRAMUSCULAR; INTRAVENOUS EVERY 6 HOURS
Status: DISCONTINUED | OUTPATIENT
Start: 2024-07-22 | End: 2024-07-24 | Stop reason: HOSPADM

## 2024-07-22 RX ORDER — TAMSULOSIN HYDROCHLORIDE 0.4 MG/1
0.4 CAPSULE ORAL 2 TIMES DAILY
Status: DISCONTINUED | OUTPATIENT
Start: 2024-07-22 | End: 2024-07-24 | Stop reason: HOSPADM

## 2024-07-22 RX ORDER — ENOXAPARIN SODIUM 100 MG/ML
40 INJECTION SUBCUTANEOUS DAILY
Status: DISCONTINUED | OUTPATIENT
Start: 2024-07-23 | End: 2024-07-24 | Stop reason: HOSPADM

## 2024-07-22 RX ORDER — SCOLOPAMINE TRANSDERMAL SYSTEM 1 MG/1
PATCH, EXTENDED RELEASE TRANSDERMAL
Status: DISPENSED
Start: 2024-07-22 | End: 2024-07-23

## 2024-07-22 RX ORDER — CETIRIZINE HYDROCHLORIDE 10 MG/1
10 TABLET ORAL
COMMUNITY

## 2024-07-22 RX ORDER — MORPHINE SULFATE 4 MG/ML
4 INJECTION INTRAVENOUS ONCE
Status: COMPLETED | OUTPATIENT
Start: 2024-07-22 | End: 2024-07-22

## 2024-07-22 RX ORDER — OMEPRAZOLE 20 MG/1
40 CAPSULE, DELAYED RELEASE ORAL DAILY
Status: DISCONTINUED | OUTPATIENT
Start: 2024-07-22 | End: 2024-07-24 | Stop reason: HOSPADM

## 2024-07-22 RX ORDER — HYDROMORPHONE HYDROCHLORIDE 1 MG/ML
0.4 INJECTION, SOLUTION INTRAMUSCULAR; INTRAVENOUS; SUBCUTANEOUS
Status: DISCONTINUED | OUTPATIENT
Start: 2024-07-22 | End: 2024-07-22 | Stop reason: HOSPADM

## 2024-07-22 RX ORDER — OXYCODONE HCL 5 MG/5 ML
5 SOLUTION, ORAL ORAL
Status: COMPLETED | OUTPATIENT
Start: 2024-07-22 | End: 2024-07-22

## 2024-07-22 RX ORDER — DIPHENHYDRAMINE HYDROCHLORIDE 50 MG/ML
12.5 INJECTION INTRAMUSCULAR; INTRAVENOUS
Status: DISCONTINUED | OUTPATIENT
Start: 2024-07-22 | End: 2024-07-22 | Stop reason: HOSPADM

## 2024-07-22 RX ORDER — OXYCODONE HCL 5 MG/5 ML
10 SOLUTION, ORAL ORAL
Status: COMPLETED | OUTPATIENT
Start: 2024-07-22 | End: 2024-07-22

## 2024-07-22 RX ORDER — MEPERIDINE HYDROCHLORIDE 50 MG/ML
12.5 INJECTION INTRAMUSCULAR; INTRAVENOUS; SUBCUTANEOUS
Status: DISCONTINUED | OUTPATIENT
Start: 2024-07-22 | End: 2024-07-22 | Stop reason: HOSPADM

## 2024-07-22 RX ORDER — PROMETHAZINE HYDROCHLORIDE 25 MG/1
12.5-25 TABLET ORAL EVERY 4 HOURS PRN
Status: DISCONTINUED | OUTPATIENT
Start: 2024-07-22 | End: 2024-07-24 | Stop reason: HOSPADM

## 2024-07-22 RX ORDER — SODIUM CHLORIDE, SODIUM LACTATE, POTASSIUM CHLORIDE, AND CALCIUM CHLORIDE .6; .31; .03; .02 G/100ML; G/100ML; G/100ML; G/100ML
500 INJECTION, SOLUTION INTRAVENOUS
Status: DISCONTINUED | OUTPATIENT
Start: 2024-07-22 | End: 2024-07-24 | Stop reason: HOSPADM

## 2024-07-22 RX ORDER — ONDANSETRON 2 MG/ML
4 INJECTION INTRAMUSCULAR; INTRAVENOUS
Status: COMPLETED | OUTPATIENT
Start: 2024-07-22 | End: 2024-07-22

## 2024-07-22 RX ORDER — EPHEDRINE SULFATE 50 MG/ML
5 INJECTION, SOLUTION INTRAVENOUS
Status: DISCONTINUED | OUTPATIENT
Start: 2024-07-22 | End: 2024-07-22 | Stop reason: HOSPADM

## 2024-07-22 RX ORDER — IBUPROFEN 800 MG/1
800 TABLET ORAL 3 TIMES DAILY PRN
Status: DISCONTINUED | OUTPATIENT
Start: 2024-07-25 | End: 2024-07-24 | Stop reason: HOSPADM

## 2024-07-22 RX ORDER — MIDAZOLAM HYDROCHLORIDE 1 MG/ML
INJECTION INTRAMUSCULAR; INTRAVENOUS PRN
Status: DISCONTINUED | OUTPATIENT
Start: 2024-07-22 | End: 2024-07-22 | Stop reason: SURG

## 2024-07-22 RX ORDER — HYDROMORPHONE HYDROCHLORIDE 1 MG/ML
0.1 INJECTION, SOLUTION INTRAMUSCULAR; INTRAVENOUS; SUBCUTANEOUS
Status: DISCONTINUED | OUTPATIENT
Start: 2024-07-22 | End: 2024-07-22 | Stop reason: HOSPADM

## 2024-07-22 RX ORDER — BUSPIRONE HYDROCHLORIDE 10 MG/1
5 TABLET ORAL
Status: DISCONTINUED | OUTPATIENT
Start: 2024-07-22 | End: 2024-07-24 | Stop reason: HOSPADM

## 2024-07-22 RX ORDER — PROCHLORPERAZINE EDISYLATE 5 MG/ML
5-10 INJECTION INTRAMUSCULAR; INTRAVENOUS EVERY 4 HOURS PRN
Status: DISCONTINUED | OUTPATIENT
Start: 2024-07-22 | End: 2024-07-24 | Stop reason: HOSPADM

## 2024-07-22 RX ORDER — DEXMEDETOMIDINE HYDROCHLORIDE 100 UG/ML
INJECTION, SOLUTION INTRAVENOUS PRN
Status: DISCONTINUED | OUTPATIENT
Start: 2024-07-22 | End: 2024-07-22 | Stop reason: SURG

## 2024-07-22 RX ADMIN — DEXMEDETOMIDINE HYDROCHLORIDE 15 MCG: 100 INJECTION, SOLUTION INTRAVENOUS at 16:02

## 2024-07-22 RX ADMIN — ONDANSETRON 4 MG: 2 INJECTION INTRAMUSCULAR; INTRAVENOUS at 16:53

## 2024-07-22 RX ADMIN — OXYCODONE HYDROCHLORIDE 5 MG: 5 SOLUTION ORAL at 17:02

## 2024-07-22 RX ADMIN — FENTANYL CITRATE 50 MCG: 50 INJECTION, SOLUTION INTRAMUSCULAR; INTRAVENOUS at 16:11

## 2024-07-22 RX ADMIN — MORPHINE SULFATE 4 MG: 4 INJECTION, SOLUTION INTRAMUSCULAR; INTRAVENOUS at 14:53

## 2024-07-22 RX ADMIN — ONDANSETRON 4 MG: 2 INJECTION INTRAMUSCULAR; INTRAVENOUS at 16:14

## 2024-07-22 RX ADMIN — CEFAZOLIN 2 G: 2 INJECTION, POWDER, FOR SOLUTION INTRAMUSCULAR; INTRAVENOUS at 23:31

## 2024-07-22 RX ADMIN — KETOROLAC TROMETHAMINE 15 MG: 15 INJECTION, SOLUTION INTRAMUSCULAR; INTRAVENOUS at 23:19

## 2024-07-22 RX ADMIN — KETOROLAC TROMETHAMINE 15 MG: 15 INJECTION, SOLUTION INTRAMUSCULAR; INTRAVENOUS at 12:00

## 2024-07-22 RX ADMIN — MORPHINE SULFATE 4 MG: 4 INJECTION, SOLUTION INTRAMUSCULAR; INTRAVENOUS at 11:32

## 2024-07-22 RX ADMIN — LIDOCAINE HYDROCHLORIDE 60 MG: 20 INJECTION, SOLUTION EPIDURAL; INFILTRATION; INTRACAUDAL at 15:59

## 2024-07-22 RX ADMIN — EPHEDRINE SULFATE 10 MG: 50 INJECTION, SOLUTION INTRAVENOUS at 16:08

## 2024-07-22 RX ADMIN — PROPOFOL 150 MG: 10 INJECTION, EMULSION INTRAVENOUS at 15:59

## 2024-07-22 RX ADMIN — KETOROLAC TROMETHAMINE 15 MG: 15 INJECTION, SOLUTION INTRAMUSCULAR; INTRAVENOUS at 16:11

## 2024-07-22 RX ADMIN — ACETAMINOPHEN 1000 MG: 500 TABLET ORAL at 23:28

## 2024-07-22 RX ADMIN — FENTANYL CITRATE 25 MCG: 50 INJECTION, SOLUTION INTRAMUSCULAR; INTRAVENOUS at 17:06

## 2024-07-22 RX ADMIN — CEFAZOLIN 2 G: 1 INJECTION, POWDER, FOR SOLUTION INTRAMUSCULAR; INTRAVENOUS at 15:59

## 2024-07-22 RX ADMIN — MIDAZOLAM HYDROCHLORIDE 2 MG: 2 INJECTION, SOLUTION INTRAMUSCULAR; INTRAVENOUS at 15:56

## 2024-07-22 RX ADMIN — FENTANYL CITRATE 50 MCG: 50 INJECTION, SOLUTION INTRAMUSCULAR; INTRAVENOUS at 15:56

## 2024-07-22 RX ADMIN — SODIUM CHLORIDE, POTASSIUM CHLORIDE, SODIUM LACTATE AND CALCIUM CHLORIDE: 600; 310; 30; 20 INJECTION, SOLUTION INTRAVENOUS at 15:54

## 2024-07-22 RX ADMIN — DEXMEDETOMIDINE HYDROCHLORIDE 10 MCG: 100 INJECTION, SOLUTION INTRAVENOUS at 16:07

## 2024-07-22 RX ADMIN — MORPHINE SULFATE 2 MG: 4 INJECTION, SOLUTION INTRAMUSCULAR; INTRAVENOUS at 19:27

## 2024-07-22 RX ADMIN — FENTANYL CITRATE 25 MCG: 50 INJECTION, SOLUTION INTRAMUSCULAR; INTRAVENOUS at 17:02

## 2024-07-22 RX ADMIN — HYDROMORPHONE HYDROCHLORIDE 0.5 MG: 1 INJECTION, SOLUTION INTRAMUSCULAR; INTRAVENOUS; SUBCUTANEOUS at 11:58

## 2024-07-22 RX ADMIN — FENTANYL CITRATE 25 MCG: 50 INJECTION, SOLUTION INTRAMUSCULAR; INTRAVENOUS at 17:04

## 2024-07-22 RX ADMIN — ONDANSETRON 4 MG: 2 INJECTION INTRAMUSCULAR; INTRAVENOUS at 11:32

## 2024-07-22 RX ADMIN — DEXAMETHASONE SODIUM PHOSPHATE 8 MG: 4 INJECTION INTRA-ARTICULAR; INTRALESIONAL; INTRAMUSCULAR; INTRAVENOUS; SOFT TISSUE at 16:06

## 2024-07-22 RX ADMIN — SODIUM CHLORIDE 1000 ML: 9 INJECTION, SOLUTION INTRAVENOUS at 11:01

## 2024-07-22 RX ADMIN — FENTANYL CITRATE 25 MCG: 50 INJECTION, SOLUTION INTRAMUSCULAR; INTRAVENOUS at 17:10

## 2024-07-22 ASSESSMENT — ENCOUNTER SYMPTOMS
BACK PAIN: 1
CHILLS: 1
VOMITING: 1
FEVER: 1
ABDOMINAL PAIN: 0
PALPITATIONS: 0
WEAKNESS: 1
FOCAL WEAKNESS: 0
HEARTBURN: 0
COUGH: 0
SHORTNESS OF BREATH: 0
DOUBLE VISION: 0
BRUISES/BLEEDS EASILY: 0
NAUSEA: 1
HEADACHES: 0
MYALGIAS: 1
DIZZINESS: 0
DEPRESSION: 0
BLURRED VISION: 0
FLANK PAIN: 1

## 2024-07-22 ASSESSMENT — PAIN DESCRIPTION - PAIN TYPE: TYPE: ACUTE PAIN

## 2024-07-22 ASSESSMENT — PAIN SCALES - GENERAL: PAIN_LEVEL: 6

## 2024-07-22 ASSESSMENT — LIFESTYLE VARIABLES: SUBSTANCE_ABUSE: 0

## 2024-07-22 ASSESSMENT — FIBROSIS 4 INDEX: FIB4 SCORE: 0.38

## 2024-07-23 ENCOUNTER — APPOINTMENT (OUTPATIENT)
Dept: CARDIOLOGY | Facility: MEDICAL CENTER | Age: 19
DRG: 660 | End: 2024-07-23
Attending: INTERNAL MEDICINE

## 2024-07-23 LAB
ALBUMIN SERPL BCP-MCNC: 3.7 G/DL (ref 3.2–4.9)
BASOPHILS # BLD AUTO: 0.1 % (ref 0–1.8)
BASOPHILS # BLD: 0.01 K/UL (ref 0–0.12)
BUN SERPL-MCNC: 11 MG/DL (ref 8–22)
CALCIUM ALBUM COR SERPL-MCNC: 8.7 MG/DL (ref 8.5–10.5)
CALCIUM SERPL-MCNC: 8.5 MG/DL (ref 8.5–10.5)
CHLORIDE SERPL-SCNC: 108 MMOL/L (ref 96–112)
CO2 SERPL-SCNC: 19 MMOL/L (ref 20–33)
CREAT SERPL-MCNC: 0.72 MG/DL (ref 0.5–1.4)
EOSINOPHIL # BLD AUTO: 0 K/UL (ref 0–0.51)
EOSINOPHIL NFR BLD: 0 % (ref 0–6.9)
ERYTHROCYTE [DISTWIDTH] IN BLOOD BY AUTOMATED COUNT: 42.2 FL (ref 35.9–50)
GFR SERPLBLD CREATININE-BSD FMLA CKD-EPI: 122 ML/MIN/1.73 M 2
GLUCOSE SERPL-MCNC: 111 MG/DL (ref 65–99)
HCT VFR BLD AUTO: 37.8 % (ref 37–47)
HGB BLD-MCNC: 12.7 G/DL (ref 12–16)
IMM GRANULOCYTES # BLD AUTO: 0.02 K/UL (ref 0–0.11)
IMM GRANULOCYTES NFR BLD AUTO: 0.2 % (ref 0–0.9)
LYMPHOCYTES # BLD AUTO: 0.81 K/UL (ref 1–4.8)
LYMPHOCYTES NFR BLD: 8.8 % (ref 22–41)
MAGNESIUM SERPL-MCNC: 2 MG/DL (ref 1.5–2.5)
MCH RBC QN AUTO: 32 PG (ref 27–33)
MCHC RBC AUTO-ENTMCNC: 33.6 G/DL (ref 32.2–35.5)
MCV RBC AUTO: 95.2 FL (ref 81.4–97.8)
MONOCYTES # BLD AUTO: 0.39 K/UL (ref 0–0.85)
MONOCYTES NFR BLD AUTO: 4.3 % (ref 0–13.4)
NEUTROPHILS # BLD AUTO: 7.94 K/UL (ref 1.82–7.42)
NEUTROPHILS NFR BLD: 86.6 % (ref 44–72)
NRBC # BLD AUTO: 0 K/UL
NRBC BLD-RTO: 0 /100 WBC (ref 0–0.2)
PHOSPHATE SERPL-MCNC: 3.7 MG/DL (ref 2.5–4.5)
PLATELET # BLD AUTO: 210 K/UL (ref 164–446)
PMV BLD AUTO: 8.9 FL (ref 9–12.9)
POTASSIUM SERPL-SCNC: 4.4 MMOL/L (ref 3.6–5.5)
RBC # BLD AUTO: 3.97 M/UL (ref 4.2–5.4)
SODIUM SERPL-SCNC: 139 MMOL/L (ref 135–145)
T4 FREE SERPL-MCNC: 1.13 NG/DL (ref 0.93–1.7)
TSH SERPL DL<=0.005 MIU/L-ACNC: 0.32 UIU/ML (ref 0.38–5.33)
WBC # BLD AUTO: 9.2 K/UL (ref 4.8–10.8)

## 2024-07-23 PROCEDURE — 700102 HCHG RX REV CODE 250 W/ 637 OVERRIDE(OP): Performed by: INTERNAL MEDICINE

## 2024-07-23 PROCEDURE — 99254 IP/OBS CNSLTJ NEW/EST MOD 60: CPT | Performed by: INTERNAL MEDICINE

## 2024-07-23 PROCEDURE — 700111 HCHG RX REV CODE 636 W/ 250 OVERRIDE (IP): Mod: JZ | Performed by: STUDENT IN AN ORGANIZED HEALTH CARE EDUCATION/TRAINING PROGRAM

## 2024-07-23 PROCEDURE — 83735 ASSAY OF MAGNESIUM: CPT

## 2024-07-23 PROCEDURE — 700102 HCHG RX REV CODE 250 W/ 637 OVERRIDE(OP): Performed by: STUDENT IN AN ORGANIZED HEALTH CARE EDUCATION/TRAINING PROGRAM

## 2024-07-23 PROCEDURE — 93005 ELECTROCARDIOGRAM TRACING: CPT | Performed by: INTERNAL MEDICINE

## 2024-07-23 PROCEDURE — 84443 ASSAY THYROID STIM HORMONE: CPT

## 2024-07-23 PROCEDURE — 84439 ASSAY OF FREE THYROXINE: CPT

## 2024-07-23 PROCEDURE — 93306 TTE W/DOPPLER COMPLETE: CPT

## 2024-07-23 PROCEDURE — 700105 HCHG RX REV CODE 258: Performed by: STUDENT IN AN ORGANIZED HEALTH CARE EDUCATION/TRAINING PROGRAM

## 2024-07-23 PROCEDURE — 99232 SBSQ HOSP IP/OBS MODERATE 35: CPT | Performed by: INTERNAL MEDICINE

## 2024-07-23 PROCEDURE — 770006 HCHG ROOM/CARE - MED/SURG/GYN SEMI*

## 2024-07-23 PROCEDURE — A9270 NON-COVERED ITEM OR SERVICE: HCPCS | Performed by: STUDENT IN AN ORGANIZED HEALTH CARE EDUCATION/TRAINING PROGRAM

## 2024-07-23 PROCEDURE — 700105 HCHG RX REV CODE 258: Performed by: INTERNAL MEDICINE

## 2024-07-23 PROCEDURE — RXMED WILLOW AMBULATORY MEDICATION CHARGE: Performed by: INTERNAL MEDICINE

## 2024-07-23 PROCEDURE — 80069 RENAL FUNCTION PANEL: CPT

## 2024-07-23 PROCEDURE — 85025 COMPLETE CBC W/AUTO DIFF WBC: CPT

## 2024-07-23 PROCEDURE — A9270 NON-COVERED ITEM OR SERVICE: HCPCS | Performed by: INTERNAL MEDICINE

## 2024-07-23 RX ORDER — POLYETHYLENE GLYCOL 3350 17 G/17G
17 POWDER, FOR SOLUTION ORAL
COMMUNITY
Start: 2024-07-23

## 2024-07-23 RX ORDER — SODIUM CHLORIDE, SODIUM LACTATE, POTASSIUM CHLORIDE, AND CALCIUM CHLORIDE .6; .31; .03; .02 G/100ML; G/100ML; G/100ML; G/100ML
500 INJECTION, SOLUTION INTRAVENOUS ONCE
Status: COMPLETED | OUTPATIENT
Start: 2024-07-23 | End: 2024-07-23

## 2024-07-23 RX ORDER — OXYCODONE HYDROCHLORIDE 5 MG/1
5 TABLET ORAL EVERY 4 HOURS PRN
Status: DISCONTINUED | OUTPATIENT
Start: 2024-07-23 | End: 2024-07-24 | Stop reason: HOSPADM

## 2024-07-23 RX ORDER — FOLIC ACID 1 MG/1
1 TABLET ORAL DAILY
Status: DISCONTINUED | OUTPATIENT
Start: 2024-07-23 | End: 2024-07-24 | Stop reason: HOSPADM

## 2024-07-23 RX ORDER — ALBUTEROL SULFATE 90 UG/1
2 AEROSOL, METERED RESPIRATORY (INHALATION) EVERY 6 HOURS PRN
Qty: 8.5 G | Refills: 0 | Status: SHIPPED | OUTPATIENT
Start: 2024-07-23

## 2024-07-23 RX ORDER — GAUZE BANDAGE 2" X 2"
100 BANDAGE TOPICAL DAILY
Status: DISCONTINUED | OUTPATIENT
Start: 2024-07-23 | End: 2024-07-24 | Stop reason: HOSPADM

## 2024-07-23 RX ORDER — LANOLIN ALCOHOL/MO/W.PET/CERES
100 CREAM (GRAM) TOPICAL DAILY
COMMUNITY
Start: 2024-07-24

## 2024-07-23 RX ORDER — FOLIC ACID 1 MG/1
1 TABLET ORAL DAILY
COMMUNITY
Start: 2024-07-24

## 2024-07-23 RX ORDER — TAMSULOSIN HYDROCHLORIDE 0.4 MG/1
0.4 CAPSULE ORAL 2 TIMES DAILY
Qty: 30 CAPSULE | Refills: 0 | Status: SHIPPED | OUTPATIENT
Start: 2024-07-23

## 2024-07-23 RX ORDER — CEPHALEXIN 500 MG/1
500 CAPSULE ORAL 2 TIMES DAILY
Qty: 4 CAPSULE | Refills: 0 | Status: ACTIVE | OUTPATIENT
Start: 2024-07-23 | End: 2024-07-26

## 2024-07-23 RX ORDER — MORPHINE SULFATE 4 MG/ML
2 INJECTION INTRAVENOUS EVERY 4 HOURS PRN
Status: DISCONTINUED | OUTPATIENT
Start: 2024-07-23 | End: 2024-07-24 | Stop reason: HOSPADM

## 2024-07-23 RX ORDER — AMOXICILLIN 250 MG
2 CAPSULE ORAL EVERY EVENING
COMMUNITY
Start: 2024-07-23

## 2024-07-23 RX ADMIN — OXYCODONE 5 MG: 5 TABLET ORAL at 21:48

## 2024-07-23 RX ADMIN — CETIRIZINE HYDROCHLORIDE 10 MG: 10 TABLET, FILM COATED ORAL at 06:34

## 2024-07-23 RX ADMIN — CEFAZOLIN 2 G: 2 INJECTION, POWDER, FOR SOLUTION INTRAMUSCULAR; INTRAVENOUS at 13:35

## 2024-07-23 RX ADMIN — MORPHINE SULFATE 2 MG: 4 INJECTION, SOLUTION INTRAMUSCULAR; INTRAVENOUS at 01:05

## 2024-07-23 RX ADMIN — SODIUM CHLORIDE, POTASSIUM CHLORIDE, SODIUM LACTATE AND CALCIUM CHLORIDE: 600; 310; 30; 20 INJECTION, SOLUTION INTRAVENOUS at 02:39

## 2024-07-23 RX ADMIN — ACETAMINOPHEN 1000 MG: 500 TABLET ORAL at 17:33

## 2024-07-23 RX ADMIN — SODIUM CHLORIDE, POTASSIUM CHLORIDE, SODIUM LACTATE AND CALCIUM CHLORIDE 500 ML: 600; 310; 30; 20 INJECTION, SOLUTION INTRAVENOUS at 10:07

## 2024-07-23 RX ADMIN — KETOROLAC TROMETHAMINE 15 MG: 15 INJECTION, SOLUTION INTRAMUSCULAR; INTRAVENOUS at 17:34

## 2024-07-23 RX ADMIN — SODIUM CHLORIDE, POTASSIUM CHLORIDE, SODIUM LACTATE AND CALCIUM CHLORIDE: 600; 310; 30; 20 INJECTION, SOLUTION INTRAVENOUS at 19:42

## 2024-07-23 RX ADMIN — ACETAMINOPHEN 1000 MG: 500 TABLET ORAL at 13:31

## 2024-07-23 RX ADMIN — KETOROLAC TROMETHAMINE 15 MG: 15 INJECTION, SOLUTION INTRAMUSCULAR; INTRAVENOUS at 13:30

## 2024-07-23 RX ADMIN — CEFAZOLIN 2 G: 2 INJECTION, POWDER, FOR SOLUTION INTRAMUSCULAR; INTRAVENOUS at 06:37

## 2024-07-23 RX ADMIN — TAMSULOSIN HYDROCHLORIDE 0.4 MG: 0.4 CAPSULE ORAL at 17:33

## 2024-07-23 RX ADMIN — ACETAMINOPHEN 1000 MG: 500 TABLET ORAL at 06:34

## 2024-07-23 RX ADMIN — OMEPRAZOLE 40 MG: 20 CAPSULE, DELAYED RELEASE ORAL at 07:46

## 2024-07-23 RX ADMIN — KETOROLAC TROMETHAMINE 15 MG: 15 INJECTION, SOLUTION INTRAMUSCULAR; INTRAVENOUS at 06:31

## 2024-07-23 RX ADMIN — THERA TABS 1 TABLET: TAB at 10:56

## 2024-07-23 RX ADMIN — TAMSULOSIN HYDROCHLORIDE 0.4 MG: 0.4 CAPSULE ORAL at 06:35

## 2024-07-23 RX ADMIN — CEFAZOLIN 2 G: 2 INJECTION, POWDER, FOR SOLUTION INTRAMUSCULAR; INTRAVENOUS at 21:50

## 2024-07-23 RX ADMIN — FOLIC ACID 1 MG: 1 TABLET ORAL at 10:56

## 2024-07-23 RX ADMIN — Medication 100 MG: at 10:57

## 2024-07-23 RX ADMIN — OXYCODONE 5 MG: 5 TABLET ORAL at 10:56

## 2024-07-23 ASSESSMENT — COGNITIVE AND FUNCTIONAL STATUS - GENERAL
SUGGESTED CMS G CODE MODIFIER DAILY ACTIVITY: CH
MOBILITY SCORE: 24
SUGGESTED CMS G CODE MODIFIER MOBILITY: CH
DAILY ACTIVITIY SCORE: 24

## 2024-07-23 ASSESSMENT — ENCOUNTER SYMPTOMS
FEVER: 0
HEMATOCHEZIA: 0
HEMOPTYSIS: 0
COUGH: 0
DIAPHORESIS: 0
WHEEZING: 0

## 2024-07-23 ASSESSMENT — PATIENT HEALTH QUESTIONNAIRE - PHQ9
2. FEELING DOWN, DEPRESSED, IRRITABLE, OR HOPELESS: NOT AT ALL
SUM OF ALL RESPONSES TO PHQ9 QUESTIONS 1 AND 2: 0
1. LITTLE INTEREST OR PLEASURE IN DOING THINGS: NOT AT ALL
SUM OF ALL RESPONSES TO PHQ9 QUESTIONS 1 AND 2: 0
2. FEELING DOWN, DEPRESSED, IRRITABLE, OR HOPELESS: NOT AT ALL
1. LITTLE INTEREST OR PLEASURE IN DOING THINGS: NOT AT ALL

## 2024-07-23 ASSESSMENT — LIFESTYLE VARIABLES
HAVE PEOPLE ANNOYED YOU BY CRITICIZING YOUR DRINKING: NO
EVER HAD A DRINK FIRST THING IN THE MORNING TO STEADY YOUR NERVES TO GET RID OF A HANGOVER: NO
HOW MANY TIMES IN THE PAST YEAR HAVE YOU HAD 5 OR MORE DRINKS IN A DAY: 0
TOTAL SCORE: 0
AVERAGE NUMBER OF DAYS PER WEEK YOU HAVE A DRINK CONTAINING ALCOHOL: 1
ON A TYPICAL DAY WHEN YOU DRINK ALCOHOL HOW MANY DRINKS DO YOU HAVE: 1
TOTAL SCORE: 0
CONSUMPTION TOTAL: NEGATIVE
TOTAL SCORE: 0
DOES PATIENT WANT TO STOP DRINKING: NO
HAVE YOU EVER FELT YOU SHOULD CUT DOWN ON YOUR DRINKING: NO
EVER FELT BAD OR GUILTY ABOUT YOUR DRINKING: NO
ALCOHOL_USE: YES

## 2024-07-24 ENCOUNTER — PHARMACY VISIT (OUTPATIENT)
Dept: PHARMACY | Facility: MEDICAL CENTER | Age: 19
End: 2024-07-24
Payer: COMMERCIAL

## 2024-07-24 VITALS
WEIGHT: 116.84 LBS | OXYGEN SATURATION: 97 % | DIASTOLIC BLOOD PRESSURE: 64 MMHG | BODY MASS INDEX: 20.7 KG/M2 | SYSTOLIC BLOOD PRESSURE: 107 MMHG | TEMPERATURE: 97.2 F | HEART RATE: 46 BPM | RESPIRATION RATE: 17 BRPM

## 2024-07-24 LAB
EKG IMPRESSION: NORMAL
LV EJECT FRACT MOD 2C 99903: 70.97
LV EJECT FRACT MOD 4C 99902: 71.93
LV EJECT FRACT MOD BP 99901: 71.46

## 2024-07-24 PROCEDURE — A9270 NON-COVERED ITEM OR SERVICE: HCPCS | Performed by: INTERNAL MEDICINE

## 2024-07-24 PROCEDURE — 93010 ELECTROCARDIOGRAM REPORT: CPT | Performed by: INTERNAL MEDICINE

## 2024-07-24 PROCEDURE — 700102 HCHG RX REV CODE 250 W/ 637 OVERRIDE(OP): Performed by: STUDENT IN AN ORGANIZED HEALTH CARE EDUCATION/TRAINING PROGRAM

## 2024-07-24 PROCEDURE — A9270 NON-COVERED ITEM OR SERVICE: HCPCS | Performed by: STUDENT IN AN ORGANIZED HEALTH CARE EDUCATION/TRAINING PROGRAM

## 2024-07-24 PROCEDURE — 93306 TTE W/DOPPLER COMPLETE: CPT | Mod: 26 | Performed by: INTERNAL MEDICINE

## 2024-07-24 PROCEDURE — 99239 HOSP IP/OBS DSCHRG MGMT >30: CPT | Performed by: INTERNAL MEDICINE

## 2024-07-24 PROCEDURE — 700105 HCHG RX REV CODE 258: Performed by: STUDENT IN AN ORGANIZED HEALTH CARE EDUCATION/TRAINING PROGRAM

## 2024-07-24 PROCEDURE — 700111 HCHG RX REV CODE 636 W/ 250 OVERRIDE (IP): Performed by: STUDENT IN AN ORGANIZED HEALTH CARE EDUCATION/TRAINING PROGRAM

## 2024-07-24 PROCEDURE — 700102 HCHG RX REV CODE 250 W/ 637 OVERRIDE(OP): Performed by: INTERNAL MEDICINE

## 2024-07-24 RX ADMIN — ACETAMINOPHEN 1000 MG: 500 TABLET ORAL at 05:54

## 2024-07-24 RX ADMIN — SODIUM CHLORIDE, POTASSIUM CHLORIDE, SODIUM LACTATE AND CALCIUM CHLORIDE: 600; 310; 30; 20 INJECTION, SOLUTION INTRAVENOUS at 03:56

## 2024-07-24 RX ADMIN — KETOROLAC TROMETHAMINE 15 MG: 15 INJECTION, SOLUTION INTRAMUSCULAR; INTRAVENOUS at 00:06

## 2024-07-24 RX ADMIN — Medication 100 MG: at 05:22

## 2024-07-24 RX ADMIN — THERA TABS 1 TABLET: TAB at 05:22

## 2024-07-24 RX ADMIN — CEFAZOLIN 2 G: 2 INJECTION, POWDER, FOR SOLUTION INTRAMUSCULAR; INTRAVENOUS at 05:21

## 2024-07-24 RX ADMIN — ACETAMINOPHEN 1000 MG: 500 TABLET ORAL at 00:06

## 2024-07-24 RX ADMIN — TAMSULOSIN HYDROCHLORIDE 0.4 MG: 0.4 CAPSULE ORAL at 05:22

## 2024-07-24 RX ADMIN — CETIRIZINE HYDROCHLORIDE 10 MG: 10 TABLET, FILM COATED ORAL at 05:22

## 2024-07-24 RX ADMIN — FOLIC ACID 1 MG: 1 TABLET ORAL at 05:22

## 2024-07-24 RX ADMIN — KETOROLAC TROMETHAMINE 15 MG: 15 INJECTION, SOLUTION INTRAMUSCULAR; INTRAVENOUS at 05:21

## 2024-07-24 ASSESSMENT — ENCOUNTER SYMPTOMS
EYE REDNESS: 0
COUGH: 0
EYE PAIN: 0
MYALGIAS: 0
BLOOD IN STOOL: 0
HEADACHES: 0
SHORTNESS OF BREATH: 0
DIARRHEA: 0
NAUSEA: 0
PALPITATIONS: 0
VOMITING: 0
WHEEZING: 0
CHILLS: 0
TREMORS: 0
HEMOPTYSIS: 0
SEIZURES: 0
FEVER: 0
FOCAL WEAKNESS: 0
FLANK PAIN: 1
FALLS: 0
ABDOMINAL PAIN: 0
WEAKNESS: 0
DIZZINESS: 0
INSOMNIA: 0
NERVOUS/ANXIOUS: 0
LOSS OF CONSCIOUSNESS: 0
CONSTIPATION: 0

## 2024-07-29 ENCOUNTER — HOSPITAL ENCOUNTER (OUTPATIENT)
Facility: MEDICAL CENTER | Age: 19
End: 2024-07-29
Attending: STUDENT IN AN ORGANIZED HEALTH CARE EDUCATION/TRAINING PROGRAM

## 2024-07-29 LAB
APPEARANCE UR: ABNORMAL
BACTERIA #/AREA URNS HPF: ABNORMAL /HPF
BILIRUB UR QL STRIP.AUTO: NEGATIVE
COLOR UR: ABNORMAL
CYSTINE CRY #/AREA URNS HPF: POSITIVE /HPF
EPI CELLS #/AREA URNS HPF: ABNORMAL /HPF
GLUCOSE UR STRIP.AUTO-MCNC: NEGATIVE MG/DL
HYALINE CASTS #/AREA URNS LPF: ABNORMAL /LPF
KETONES UR STRIP.AUTO-MCNC: NEGATIVE MG/DL
LEUKOCYTE ESTERASE UR QL STRIP.AUTO: ABNORMAL
MICRO URNS: ABNORMAL
NITRITE UR QL STRIP.AUTO: NEGATIVE
PH UR STRIP.AUTO: 7.5 [PH] (ref 5–8)
PROT UR QL STRIP: >=1000 MG/DL
RBC # URNS HPF: >150 /HPF
RBC UR QL AUTO: ABNORMAL
SP GR UR STRIP.AUTO: 1.02
UROBILINOGEN UR STRIP.AUTO-MCNC: 0.2 MG/DL
WBC #/AREA URNS HPF: ABNORMAL /HPF

## 2024-07-29 PROCEDURE — 87086 URINE CULTURE/COLONY COUNT: CPT

## 2024-07-29 PROCEDURE — 81001 URINALYSIS AUTO W/SCOPE: CPT

## 2024-07-31 LAB
BACTERIA UR CULT: NORMAL
SIGNIFICANT IND 70042: NORMAL
SITE SITE: NORMAL
SOURCE SOURCE: NORMAL

## 2024-09-30 NOTE — TELEPHONE ENCOUNTER
Past Medical History:   Diagnosis Date    Abnormal Pap smear of cervix 2017    Arthritis     Hyperlipidemia     Hypertension     Mild intermittent asthma, uncomplicated     Osteoporosis     Rheumatoid arthritis, unspecified 12/17/2020       Past Surgical History:   Procedure Laterality Date    ANKLE SURGERY Left     COLONOSCOPY N/A 05/31/2023    Procedure: COLONOSCOPY;  Surgeon: Robbie Ace MD;  Location: [facility];  Service: Endoscopy;  Laterality: N/A;    ESOPHAGOGASTRODUODENOSCOPY N/A 05/31/2023    Procedure: EGD (ESOPHAGOGASTRODUODENOSCOPY);  Surgeon: Robbie Ace MD;  Location: [facility];  Service: Endoscopy;  Laterality: N/A;    FRACTURE SURGERY      JOINT REPLACEMENT  April 2016    Full Left ankle replacement    WRIST FRACTURE SURGERY Left        Review of patient's allergies indicates:  No Known Allergies    No current facility-administered medications for this encounter.     Current Outpatient Medications   Medication Sig    acetaminophen (TYLENOL) 325 MG tablet Take 2 tablets (650 mg total) by mouth every 6 (six) hours as needed for Pain.    albuterol (ACCUNEB) 1.25 mg/3 mL Nebu Take 3 mLs (1.25 mg total) by nebulization every 6 (six) hours as needed (shortness of breath, wheezing).    albuterol (VENTOLIN HFA) 90 mcg/actuation inhaler Inhale 2 puffs into the lungs every 4 (four) hours as needed for Wheezing. Rescue    aspirin (ECOTRIN) 81 MG EC tablet Take 1 tablet (81 mg total) by mouth 2 (two) times a day.    atorvastatin (LIPITOR) 10 MG tablet Take 1 tablet (10 mg total) by mouth once daily.    cholecalciferol, vitamin D3, 125 mcg (5,000 unit) Tab Take 5,000 Units by mouth once daily.    EScitalopram oxalate (LEXAPRO) 20 MG tablet Take 1 tablet (20 mg total) by mouth once daily.    folic acid (FOLVITE) 1 MG tablet Take 1 tablet (1 mg total) by mouth once daily.    hydroCHLOROthiazide (HYDRODIURIL) 25 MG tablet Take 1 tablet (25 mg total) by mouth once daily.    ibuprofen (ADVIL,MOTRIN) 600 MG  lvm for them regarding lab work  and gave info to call back   "tablet Take 1 tablet (600 mg total) by mouth every 6 (six) hours as needed for Pain.    methocarbamoL (ROBAXIN) 750 MG Tab Take 1 tablet (750 mg total) by mouth 3 (three) times daily. for 5 days    methotrexate 2.5 MG Tab Take 10 tablets (25 mg total) by mouth every 7 days.    multivitamin-minerals-lutein (MULTIVITAMIN 50 PLUS) Tab Take 1 tablet by mouth once daily.    omega 3-dha-epa-fish oil 120-180-500 mg Cap     ondansetron (ZOFRAN-ODT) 4 MG TbDL Take 1 tablet (4 mg total) by mouth every 8 (eight) hours as needed (nausea). (Patient not taking: Reported on 9/13/2024)    oxyCODONE (ROXICODONE) 5 MG immediate release tablet Take 1 tablet (5 mg total) by mouth every 6 (six) hours as needed for Pain.    pantoprazole (PROTONIX) 40 MG tablet Take 1 tablet (40 mg total) by mouth once daily.    pen needle, diabetic (BD ULTRA-FINE MINI PEN NEEDLE) 31 gauge x 3/16" Ndle Inject 1 each into the skin once daily.    pregabalin (LYRICA) 50 MG capsule Take 1 capsule (50 mg total) by mouth 3 (three) times daily.    sarilumab (KEVZARA) 200 mg/1.14 mL PnIj Inject 1.14 mLs (200 mg total) into the skin every 14 (fourteen) days.    teriparatide (FORTEO) 20 mcg/dose (600mcg/2.4mL) PnIj Inject 0.08 mLs (20 mcg total) into the skin once daily.     Family History       Problem Relation (Age of Onset)    Arthritis Mother, Sister    Asthma Sister, Sister    Cervical cancer Maternal Cousin    Diabetes Sister    Early death Sister    Heart disease Mother, Sister, Sister    Hypertension Mother    Stroke Mother, Sister, Sister          Tobacco Use    Smoking status: Never    Smokeless tobacco: Never   Substance and Sexual Activity    Alcohol use: Not Currently     Comment: Very rarely    Drug use: Never    Sexual activity: Yes     Partners: Male     Birth control/protection: None     ROS  Constitutional: negative for fevers  Eyes: negative visual changes  ENT: negative for hearing loss  Respiratory: negative for dyspnea  Cardiovascular: " "negative for chest pain  Gastrointestinal: negative for abdominal pain  Genitourinary: negative for dysuria  Neurological: negative for headaches  Behavioral/Psych: negative for hallucinations  Endocrine: negative for temperature intolerance    Objective:     Vital Signs (Most Recent):  Temp: 98.3 °F (36.8 °C) (09/29/24 1811)  Pulse: 85 (09/29/24 2202)  Resp: (!) 24 (09/29/24 2202)  BP: 130/62 (09/29/24 2202)  SpO2: 98 % (09/29/24 2202) Vital Signs (24h Range):  Pulse:  [84-90] 85  Resp:  [14-24] 24  SpO2:  [96 %-99 %] 98 %  BP: (119-168)/(58-88) 130/62     Weight: 86.2 kg (190 lb)  Height: 4' 11" (149.9 cm)  Body mass index is 38.38 kg/m².    No intake or output data in the 24 hours ending 09/30/24 1856     Ortho/SPM Exam  General:  no acute distress, appears stated age   Neuro: alert and oriented x3  Psych: normal mood  Head: normocephalic, periorbital abrasions on the left  Eyes: no scleral icterus  Mouth: moist mucous membranes  Cardiovascular: extremities warm and well perfused  Lungs: breathing comfortably, equal chest rise bilat  Skin: clean, dry, intact (any exceptions noted in below musculoskeletal exam)    MSK:  RUE:  - Skin intact throughout, no open wounds  - No swelling  - No ecchymosis, erythema, or signs of cellulitis  - NonTTP throughout  - AROM and PROM of the shoulder, elbow, wrist, and hand intact without pain  - Axillary/AIN/PIN/Radial/Median/Ulnar Nerves assessed in isolation without deficit  - SILT throughout  - Compartments soft  - Radial artery palpated   - Capillary Refill <3s    LUE:  - Skin intact throughout, no open wounds  - Moderate swelling and ecchymosis to elbow  - TTP to distal humerus  - AROM and PROM of the wrist and hand intact without pain  - Pain with ROM of elbow or shoulder  - Axillary/AIN/PIN/Radial/Median/Ulnar Nerves assessed in isolation without deficit  - SILT throughout  - Compartments soft  - Radial artery palpated   - Capillary Refill <3s    RLE:  - Skin intact " throughout, no open wounds  - No swelling  - No ecchymosis, erythema, or signs of cellulitis  - NonTTP throughout  - AROM and PROM of the hip, knee, ankle, and foot intact without pain  - TA/EHL/Gastroc/FHL assessed in isolation without deficit  - SILT throughout  - Compartments soft  - DP and PT palpated  - Capillary Refill <3s  - Negative Log roll      LLE:  - Skin intact throughout, no open wounds  - No swelling  - No ecchymosis, erythema, or signs of cellulitis  - NonTTP throughout  - AROM and PROM of the hip, knee, ankle, and foot intact without pain  - TA/EHL/Gastroc/FHL assessed in isolation without deficit  - SILT throughout  - Compartments soft  - DP and PT palpated  - Capillary Refill <3s  - Negative Log roll      Spine/pelvis/axial body:  No tenderness to palpation of cervical, thoracic, or lumbar spine  No pain with compression of pelvis  No chest wall or abdominal tenderness         Significant Labs:   All pertinent labs within the past 24 hours have been reviewed.    Significant Imaging: I have reviewed and interpreted all pertinent imaging results/findings.  Imaging of left humerus shows a displaced and rotated distal humerus fracture

## 2024-12-24 ENCOUNTER — NON-PROVIDER VISIT (OUTPATIENT)
Dept: MEDICAL GROUP | Facility: PHYSICIAN GROUP | Age: 19
End: 2024-12-24

## 2024-12-24 DIAGNOSIS — Z23 NEED FOR VACCINATION: ICD-10-CM

## 2024-12-24 PROCEDURE — 90656 IIV3 VACC NO PRSV 0.5 ML IM: CPT | Performed by: FAMILY MEDICINE

## 2024-12-24 PROCEDURE — 90471 IMMUNIZATION ADMIN: CPT | Performed by: FAMILY MEDICINE

## 2024-12-24 NOTE — PROGRESS NOTES
"Ivory Chávez is a 19 y.o. female here for a non-provider visit for:   FLU    Reason for immunization: Annual Flu Vaccine  Immunization records indicate need for vaccine: Yes, confirmed with Epic and confirmed with [place] [employer]  Minimum interval has been met for this vaccine: Yes  ABN completed: Not Indicated    VIS Dated  8/6/21 was given to patient: Yes  All IAC Questionnaire questions were answered \"No.\"    Patient tolerated injection and no adverse effects were observed or reported: Yes    Pt scheduled for next dose in series: Not Indicated  "

## 2025-01-07 ENCOUNTER — APPOINTMENT (OUTPATIENT)
Dept: RADIOLOGY | Facility: MEDICAL CENTER | Age: 20
End: 2025-01-07
Attending: EMERGENCY MEDICINE
Payer: COMMERCIAL

## 2025-01-07 ENCOUNTER — HOSPITAL ENCOUNTER (EMERGENCY)
Facility: MEDICAL CENTER | Age: 20
End: 2025-01-07
Attending: EMERGENCY MEDICINE
Payer: COMMERCIAL

## 2025-01-07 VITALS
DIASTOLIC BLOOD PRESSURE: 60 MMHG | WEIGHT: 118.83 LBS | HEART RATE: 67 BPM | BODY MASS INDEX: 21.05 KG/M2 | TEMPERATURE: 97 F | OXYGEN SATURATION: 98 % | SYSTOLIC BLOOD PRESSURE: 112 MMHG | RESPIRATION RATE: 17 BRPM

## 2025-01-07 DIAGNOSIS — N39.0 ACUTE UTI: ICD-10-CM

## 2025-01-07 LAB
ALBUMIN SERPL BCP-MCNC: 4.9 G/DL (ref 3.2–4.9)
ALBUMIN/GLOB SERPL: 1.9 G/DL
ALP SERPL-CCNC: 58 U/L (ref 30–99)
ALT SERPL-CCNC: 10 U/L (ref 2–50)
AMORPHOUS CRYSTALS 1764: PRESENT /HPF
ANION GAP SERPL CALC-SCNC: 15 MMOL/L (ref 7–16)
APPEARANCE UR: ABNORMAL
AST SERPL-CCNC: 14 U/L (ref 12–45)
BACTERIA #/AREA URNS HPF: ABNORMAL /HPF
BASOPHILS # BLD AUTO: 0.8 % (ref 0–1.8)
BASOPHILS # BLD: 0.04 K/UL (ref 0–0.12)
BILIRUB SERPL-MCNC: 0.8 MG/DL (ref 0.1–1.5)
BILIRUB UR QL STRIP.AUTO: NEGATIVE
BUN SERPL-MCNC: 12 MG/DL (ref 8–22)
CALCIUM ALBUM COR SERPL-MCNC: 8.7 MG/DL (ref 8.5–10.5)
CALCIUM SERPL-MCNC: 9.4 MG/DL (ref 8.5–10.5)
CASTS URNS QL MICRO: ABNORMAL /LPF (ref 0–2)
CHLORIDE SERPL-SCNC: 104 MMOL/L (ref 96–112)
CO2 SERPL-SCNC: 21 MMOL/L (ref 20–33)
COLOR UR: YELLOW
CREAT SERPL-MCNC: 0.78 MG/DL (ref 0.5–1.4)
EOSINOPHIL # BLD AUTO: 0.15 K/UL (ref 0–0.51)
EOSINOPHIL NFR BLD: 3.1 % (ref 0–6.9)
EPITHELIAL CELLS 1715: ABNORMAL /HPF (ref 0–5)
ERYTHROCYTE [DISTWIDTH] IN BLOOD BY AUTOMATED COUNT: 42 FL (ref 35.9–50)
GFR SERPLBLD CREATININE-BSD FMLA CKD-EPI: 111 ML/MIN/1.73 M 2
GLOBULIN SER CALC-MCNC: 2.6 G/DL (ref 1.9–3.5)
GLUCOSE SERPL-MCNC: 91 MG/DL (ref 65–99)
GLUCOSE UR STRIP.AUTO-MCNC: NEGATIVE MG/DL
HCG SERPL QL: NEGATIVE
HCT VFR BLD AUTO: 45.4 % (ref 37–47)
HGB BLD-MCNC: 15.1 G/DL (ref 12–16)
HYALINE CAST   1831: PRESENT /LPF
IMM GRANULOCYTES # BLD AUTO: 0 K/UL (ref 0–0.11)
IMM GRANULOCYTES NFR BLD AUTO: 0 % (ref 0–0.9)
KETONES UR STRIP.AUTO-MCNC: ABNORMAL MG/DL
LEUKOCYTE ESTERASE UR QL STRIP.AUTO: ABNORMAL
LIPASE SERPL-CCNC: 18 U/L (ref 11–82)
LYMPHOCYTES # BLD AUTO: 1.93 K/UL (ref 1–4.8)
LYMPHOCYTES NFR BLD: 39.5 % (ref 22–41)
Lab: PRESENT /HPF
MCH RBC QN AUTO: 31.8 PG (ref 27–33)
MCHC RBC AUTO-ENTMCNC: 33.3 G/DL (ref 32.2–35.5)
MCV RBC AUTO: 95.6 FL (ref 81.4–97.8)
MICRO URNS: ABNORMAL
MONOCYTES # BLD AUTO: 0.44 K/UL (ref 0–0.85)
MONOCYTES NFR BLD AUTO: 9 % (ref 0–13.4)
NEUTROPHILS # BLD AUTO: 2.33 K/UL (ref 1.82–7.42)
NEUTROPHILS NFR BLD: 47.6 % (ref 44–72)
NITRITE UR QL STRIP.AUTO: NEGATIVE
NRBC # BLD AUTO: 0 K/UL
NRBC BLD-RTO: 0 /100 WBC (ref 0–0.2)
PH UR STRIP.AUTO: 7.5 [PH] (ref 5–8)
PLATELET # BLD AUTO: 260 K/UL (ref 164–446)
PMV BLD AUTO: 8.7 FL (ref 9–12.9)
POTASSIUM SERPL-SCNC: 4 MMOL/L (ref 3.6–5.5)
PROT SERPL-MCNC: 7.5 G/DL (ref 6–8.2)
PROT UR QL STRIP: NEGATIVE MG/DL
RBC # BLD AUTO: 4.75 M/UL (ref 4.2–5.4)
RBC # URNS HPF: ABNORMAL /HPF (ref 0–2)
RBC UR QL AUTO: ABNORMAL
SODIUM SERPL-SCNC: 140 MMOL/L (ref 135–145)
SP GR UR STRIP.AUTO: 1.02
UROBILINOGEN UR STRIP.AUTO-MCNC: 1 EU/DL
WBC # BLD AUTO: 4.9 K/UL (ref 4.8–10.8)
WBC #/AREA URNS HPF: ABNORMAL /HPF

## 2025-01-07 PROCEDURE — 96375 TX/PRO/DX INJ NEW DRUG ADDON: CPT

## 2025-01-07 PROCEDURE — 36415 COLL VENOUS BLD VENIPUNCTURE: CPT

## 2025-01-07 PROCEDURE — 74176 CT ABD & PELVIS W/O CONTRAST: CPT

## 2025-01-07 PROCEDURE — 700111 HCHG RX REV CODE 636 W/ 250 OVERRIDE (IP): Mod: JZ | Performed by: EMERGENCY MEDICINE

## 2025-01-07 PROCEDURE — 96374 THER/PROPH/DIAG INJ IV PUSH: CPT

## 2025-01-07 PROCEDURE — 83690 ASSAY OF LIPASE: CPT

## 2025-01-07 PROCEDURE — 99285 EMERGENCY DEPT VISIT HI MDM: CPT

## 2025-01-07 PROCEDURE — 700105 HCHG RX REV CODE 258: Performed by: EMERGENCY MEDICINE

## 2025-01-07 PROCEDURE — 87086 URINE CULTURE/COLONY COUNT: CPT

## 2025-01-07 PROCEDURE — 84703 CHORIONIC GONADOTROPIN ASSAY: CPT

## 2025-01-07 PROCEDURE — 80053 COMPREHEN METABOLIC PANEL: CPT

## 2025-01-07 PROCEDURE — 85025 COMPLETE CBC W/AUTO DIFF WBC: CPT

## 2025-01-07 PROCEDURE — 81001 URINALYSIS AUTO W/SCOPE: CPT

## 2025-01-07 PROCEDURE — 96376 TX/PRO/DX INJ SAME DRUG ADON: CPT

## 2025-01-07 RX ORDER — SODIUM CHLORIDE 9 MG/ML
1000 INJECTION, SOLUTION INTRAVENOUS ONCE
Status: COMPLETED | OUTPATIENT
Start: 2025-01-07 | End: 2025-01-07

## 2025-01-07 RX ORDER — ONDANSETRON 2 MG/ML
4 INJECTION INTRAMUSCULAR; INTRAVENOUS ONCE
Status: COMPLETED | OUTPATIENT
Start: 2025-01-07 | End: 2025-01-07

## 2025-01-07 RX ORDER — MORPHINE SULFATE 4 MG/ML
4 INJECTION INTRAVENOUS ONCE
Status: COMPLETED | OUTPATIENT
Start: 2025-01-07 | End: 2025-01-07

## 2025-01-07 RX ORDER — ONDANSETRON 4 MG/1
4 TABLET, ORALLY DISINTEGRATING ORAL EVERY 8 HOURS PRN
Qty: 20 TABLET | Refills: 0 | Status: SHIPPED | OUTPATIENT
Start: 2025-01-07

## 2025-01-07 RX ORDER — CEFTRIAXONE 2 G/1
2000 INJECTION, POWDER, FOR SOLUTION INTRAMUSCULAR; INTRAVENOUS ONCE
Status: COMPLETED | OUTPATIENT
Start: 2025-01-07 | End: 2025-01-07

## 2025-01-07 RX ORDER — SULFAMETHOXAZOLE AND TRIMETHOPRIM 800; 160 MG/1; MG/1
1 TABLET ORAL 2 TIMES DAILY
Qty: 14 TABLET | Refills: 0 | Status: ACTIVE | OUTPATIENT
Start: 2025-01-07 | End: 2025-01-14

## 2025-01-07 RX ORDER — OXYCODONE AND ACETAMINOPHEN 5; 325 MG/1; MG/1
1 TABLET ORAL EVERY 4 HOURS PRN
Qty: 20 TABLET | Refills: 0 | Status: SHIPPED | OUTPATIENT
Start: 2025-01-07 | End: 2025-01-12

## 2025-01-07 RX ADMIN — SODIUM CHLORIDE 1000 ML: 9 INJECTION, SOLUTION INTRAVENOUS at 13:36

## 2025-01-07 RX ADMIN — MORPHINE SULFATE 4 MG: 4 INJECTION INTRAVENOUS at 13:37

## 2025-01-07 RX ADMIN — ONDANSETRON 4 MG: 2 INJECTION INTRAMUSCULAR; INTRAVENOUS at 12:29

## 2025-01-07 RX ADMIN — MORPHINE SULFATE 4 MG: 4 INJECTION INTRAVENOUS at 12:45

## 2025-01-07 RX ADMIN — CEFTRIAXONE SODIUM 2000 MG: 2 INJECTION, POWDER, FOR SOLUTION INTRAMUSCULAR; INTRAVENOUS at 16:00

## 2025-01-07 ASSESSMENT — FIBROSIS 4 INDEX: FIB4 SCORE: 0.45

## 2025-01-07 NOTE — ED TRIAGE NOTES
Pt ambulatory to triage c/o bilateral flank pain x 2 days. Pt states hx of kidney disease and frequent uti's. Pt states had fever of 101 today. Pt tearful in triage.

## 2025-01-07 NOTE — ED PROVIDER NOTES
ED Provider Note    CHIEF COMPLAINT  Chief Complaint   Patient presents with    Abdominal Pain    N/V       EXTERNAL RECORDS REVIEWED  Inpatient Notes I reviewed the discharge summary from July 24, 2024, at that time the patient was admitted with right-sided flank pain with the right 5 mm UVJ calculus and multiple left-sided nonobstructing calculi.    HPI/ROS  LIMITATION TO HISTORY   Select: : None  OUTSIDE HISTORIAN(S):  None    Ivory Chávez is a 19 y.o. female who presents with past medical history significant for multiple kidney stones that have been analyzed and are composed of cystine as well as multiple UTIs.  She reports nausea vomiting and severe bilateral flank pain for 2 days.  She had a fever of 101 today.  She denies any blood in her urine.  She has had lithotripsy twice in the past.  She takes oral Toradol which has not helped her pain.    PAST MEDICAL HISTORY   has a past medical history of Allergy, Anxiety, Cystinuria (HCC), Cystinuria (HCC), Nondisplaced bimalleolar fracture of left lower leg, initial encounter for closed fracture (2007), Psychiatric problem, Renal disorder, and Urinary tract infection.    SURGICAL HISTORY   has a past surgical history that includes other (2006); percutaneous nephrostolithotomy (Left, 11/5/2018); stent replacement (Left, 11/5/2018); ureteroscopy (Left, 11/13/2018); stent placement (Left, 11/13/2018); cystoscopy (11/13/2018); lithotripsy (Left, 11/13/2018); cystoscopy,insert ureteral stent (Left, 8/19/2019); lasertripsy (Left, 8/19/2019); cystoscopy,insert ureteral stent (Left, 7/1/2020); cysto/uretero/pyeloscopy, dx (Left, 7/1/2020); lasertripsy (7/1/2020); cystoscopy,insert ureteral stent (Left, 2/24/2022); other orthopedic surgery (2021); cystoscopy,insert ureteral stent (Left, 3/7/2022); cysto/uretero/pyeloscopy, dx (Left, 3/7/2022); lasertripsy (Left, 3/7/2022); cystoscopy,insert ureteral stent (N/A, 3/16/2023); cysto/uretero/pyeloscopy, dx (Left,  3/16/2023); cystoscopy with ureteral stent insertion or removal (Bilateral, 8/27/2023); lasertripsy (Bilateral, 8/27/2023); and cystoscopy,insert ureteral stent (Right, 7/22/2024).    FAMILY HISTORY  Family History   Problem Relation Age of Onset    No Known Problems Mother     No Known Problems Father     No Known Problems Sister     No Known Problems Sister     No Known Problems Brother     No Known Problems Maternal Grandmother     No Known Problems Maternal Grandfather     No Known Problems Paternal Grandmother     No Known Problems Paternal Grandfather     Cancer Neg Hx     Diabetes Neg Hx     Heart Disease Neg Hx     Hyperlipidemia Neg Hx     Hypertension Neg Hx     Stroke Neg Hx        SOCIAL HISTORY  Social History     Tobacco Use    Smoking status: Never    Smokeless tobacco: Never   Vaping Use    Vaping status: Former    Substances: Nicotine    Devices: Disposable   Substance and Sexual Activity    Alcohol use: Yes     Comment: socially    Drug use: Never    Sexual activity: Yes     Partners: Male     Birth control/protection: Injection       CURRENT MEDICATIONS  Home Medications       Reviewed by Susan Pascual R.N. (Registered Nurse) on 01/07/25 at 1125  Med List Status: Partial     Medication Last Dose Status   acetaminophen (TYLENOL) 500 MG Tab  Active   albuterol 108 (90 Base) MCG/ACT Aero Soln inhalation aerosol  Active   busPIRone (BUSPAR) 10 MG Tab tablet  Active   cetirizine (ZYRTEC) 10 MG Tab  Active   EPINEPHrine (EPIPEN) 0.3 MG/0.3ML Solution Auto-injector solution for injection  Active   folic acid (FOLVITE) 1 MG Tab  Active   multivitamin Tab  Active   omeprazole (PRILOSEC) 40 MG delayed-release capsule  Active   ondansetron (ZOFRAN ODT) 4 MG TABLET DISPERSIBLE  Active   polyethylene glycol/lytes (MIRALAX) Pack  Active   senna-docusate (PERICOLACE OR SENOKOT S) 8.6-50 MG Tab  Active   tamsulosin (FLOMAX) 0.4 MG capsule  Active   thiamine (THIAMINE) 100 MG tablet  Active                            ALLERGIES  Allergies   Allergen Reactions    Latex Anaphylaxis       PHYSICAL EXAM  VITAL SIGNS: /73   Pulse 83   Temp 36.1 °C (97 °F) (Temporal)   Resp 18   Wt 53.9 kg (118 lb 13.3 oz)   LMP 12/29/2024 (Approximate)   SpO2 97%   BMI 21.05 kg/m²      Constitutional: Alert.  Tearful.   HENT: No signs of trauma, Bilateral external ears normal, Nose normal.   Eyes: Pupils are equal and reactive, Conjunctiva normal, Non-icteric.   Neck: Normal range of motion, No tenderness, Supple, No stridor.   Lymphatic: No lymphadenopathy noted.   Abdomen: Bowel sounds normal, Soft, No tenderness, No peritoneal signs, No masses, No pulsatile masses.   Skin: Warm, Dry, No erythema, No rash.   Extremities: Intact distal pulses, No edema, No tenderness, No cyanosis.  Musculoskeletal: Good range of motion in all major joints. No tenderness to palpation or major deformities noted.   Neurologic: Alert , Normal motor function, Normal sensory function, No focal deficits noted.   Psychiatric: Affect normal, Judgment normal, Mood normal.       LABS      Labs Reviewed   CBC WITH DIFFERENTIAL - Abnormal; Notable for the following components:       Result Value    MPV 8.7 (*)     All other components within normal limits   URINALYSIS,CULTURE IF INDICATED - Abnormal; Notable for the following components:    Character Turbid (*)     Ketones Trace (*)     Leukocyte Esterase Small (*)     Occult Blood Trace (*)     All other components within normal limits   URINE MICROSCOPIC (W/UA) - Abnormal; Notable for the following components:    WBC 11-20 (*)     RBC 3-5 (*)     Bacteria Moderate (*)     Epithelial Cells 6-10 (*)     Amorphous Crystal Present (*)     Cystine Crystal Present (*)     Urine Casts 3-5 (*)     All other components within normal limits   COMP METABOLIC PANEL   LIPASE   HCG QUAL SERUM   ESTIMATED GFR   URINE CULTURE(NEW)         RADIOLOGY/PROCEDURES   I have independently interpreted the diagnostic imaging  associated with this visit and am waiting the final reading from the radiologist.   My preliminary interpretation is as follows: On CT scan abdomen pelvis, IUD, no hydronephrosis, multiple intrarenal stones    Radiologist interpretation:  CT-RENAL COLIC EVALUATION(A/P W/O)   Final Result      1.  Bilateral nonobstructing kidney stones, larger and more numerous on the LEFT.   2.  No ureteral stone or hydronephrosis.   3.  No focal mesenteric inflammatory process.          COURSE & MEDICAL DECISION MAKING    ASSESSMENT, COURSE AND PLAN  Care Narrative:     Patient presents with bilateral severe flank pain, vomiting, history of kidney stones and UTIs.  CT scan was ordered, labs ordered, 4 mg IV morphine and 4 mg IV Zofran was ordered.  UA was ordered.    12:42 PM the patient's pregnancy test is negative.  White blood count is normal 4.9.  H&H normal 15 and 45.    1:02 PM CMP is unremarkable, CT scan and UA are pending.    2:21 PM the patient's pain is returned and she is given for an additional 4 mg IV morphine.    3:18 PM CT negative for hydronephrosis, multiple nonobstructing intrarenal stones bilaterally.    3:30 PM the patient's urine is positive for infection.  She is given Rocephin 2 g IV.  She will be discharged with antibiotics, Zofran and Percocet.  I would like her to follow-up with your urologist for lithotripsy.                ADDITIONAL PROBLEMS MANAGED  History of kidney stones, fever, UTI    DISPOSITION AND DISCUSSIONS  I have discussed management of the patient with the following physicians and LAVINIA's: None    Discussion of management with other QHP or appropriate source(s): None     Escalation of care considered, and ultimately not performed:acute inpatient care management, however at this time, the patient is most appropriate for outpatient management    Barriers to care at this time, including but not limited to:  None .     Decision tools and prescription drugs considered including, but not limited  to:  Prescription for antibiotics for UTI, Percocet, and Zofran.      I reviewed prescription monitoring program for patient's narcotic use before prescribing a scheduled drug.The patient will not drink alcohol nor drive with prescribed medications. The patient will return for new or worsening symptoms and is stable at the time of discharge.    The patient is referred to a primary physician for blood pressure management, diabetic screening, and for all other preventative health concerns.    In prescribing controlled substances to this patient, I certify that I have obtained and reviewed the medical history of Ivory Chávez. I have also made a good artemio effort to obtain applicable records from other providers who have treated the patient and records did not demonstrate any increased risk of substance abuse that would prevent me from prescribing controlled substances.     I have conducted a physical exam and documented it. I have reviewed Ms. Chávez’s prescription history as maintained by the [place] Prescription Monitoring Program.     I have assessed the patient’s risk for abuse, dependency, and addiction using the validated Opioid Risk Tool available at https://www.mdcalc.com/tsyhcx-qbnc-ygli-ort-narcotic-abuse.     Given the above, I believe the benefits of controlled substance therapy outweigh the risks. The reasons for prescribing controlled substances include non-narcotic, oral analgesic alternatives have been inadequate for pain control. Accordingly, I have discussed the risk and benefits, treatment plan, and alternative therapies with the patient.       DISPOSITION:  Patient will be discharged home in stable condition.    FOLLOW UP:  [facility], Emergency Dept  [address] [id number] [phone]    If symptoms worsen    ADRIANA Camacho.[facility]  [address] [id number] [phone]      As needed      Follow-up with your urologist,  you likely will require lithotripsy again          OUTPATIENT MEDICATIONS:  New Prescriptions    ONDANSETRON (ZOFRAN ODT) 4 MG TABLET DISPERSIBLE    Take 1 Tablet by mouth every 8 hours as needed for Nausea/Vomiting.    OXYCODONE-ACETAMINOPHEN (PERCOCET) 5-325 MG TAB    Take 1 Tablet by mouth every four hours as needed (pain) for up to 5 days. No driving, no drinking alcohol    SULFAMETHOXAZOLE-TRIMETHOPRIM (BACTRIM DS) 800-160 MG TABLET    Take 1 Tablet by mouth 2 times a day for 7 days.         FINAL DIAGNOSIS  1. Acute UTI         Electronically signed by: Phan Durham M.D., 1/7/2025 12:08 PM

## 2025-01-08 NOTE — ED NOTES
..Pt verbalizes understanding of dc instructions. Rx given. Pt states will not drink/drive on rx of narcotics provided. Pt states all questions have been answered and they feel comfortable with dc instructions provided. Pt states has all personal belonging. Pt to lobby w/o incident

## 2025-01-09 LAB
BACTERIA UR CULT: NORMAL
SIGNIFICANT IND 70042: NORMAL
SITE SITE: NORMAL
SOURCE SOURCE: NORMAL

## 2025-01-23 ENCOUNTER — APPOINTMENT (OUTPATIENT)
Dept: RADIOLOGY | Facility: MEDICAL CENTER | Age: 20
End: 2025-01-23
Attending: UROLOGY
Payer: COMMERCIAL

## 2025-02-03 ENCOUNTER — HOSPITAL ENCOUNTER (OUTPATIENT)
Facility: MEDICAL CENTER | Age: 20
End: 2025-02-03
Payer: COMMERCIAL

## 2025-02-03 ENCOUNTER — APPOINTMENT (OUTPATIENT)
Dept: MEDICAL GROUP | Facility: PHYSICIAN GROUP | Age: 20
End: 2025-02-03

## 2025-02-03 VITALS
HEIGHT: 63 IN | SYSTOLIC BLOOD PRESSURE: 104 MMHG | RESPIRATION RATE: 16 BRPM | WEIGHT: 116 LBS | DIASTOLIC BLOOD PRESSURE: 56 MMHG | HEART RATE: 74 BPM | TEMPERATURE: 98.4 F | OXYGEN SATURATION: 98 % | BODY MASS INDEX: 20.55 KG/M2

## 2025-02-03 DIAGNOSIS — R59.1 LYMPHADENOPATHY: ICD-10-CM

## 2025-02-03 DIAGNOSIS — N20.0 RENAL CALCULUS: ICD-10-CM

## 2025-02-03 DIAGNOSIS — E72.01 CYSTINURIA (HCC): ICD-10-CM

## 2025-02-03 DIAGNOSIS — F41.9 ANXIETY: ICD-10-CM

## 2025-02-03 DIAGNOSIS — R00.1 BRADYCARDIA: ICD-10-CM

## 2025-02-03 DIAGNOSIS — J32.9 RECURRENT SINUS INFECTIONS: ICD-10-CM

## 2025-02-03 PROCEDURE — 3074F SYST BP LT 130 MM HG: CPT | Performed by: PHYSICIAN ASSISTANT

## 2025-02-03 PROCEDURE — 3078F DIAST BP <80 MM HG: CPT | Performed by: PHYSICIAN ASSISTANT

## 2025-02-03 PROCEDURE — 87086 URINE CULTURE/COLONY COUNT: CPT

## 2025-02-03 PROCEDURE — 87077 CULTURE AEROBIC IDENTIFY: CPT

## 2025-02-03 PROCEDURE — 99214 OFFICE O/P EST MOD 30 MIN: CPT | Performed by: PHYSICIAN ASSISTANT

## 2025-02-03 RX ORDER — PAROXETINE 10 MG/1
10 TABLET, FILM COATED ORAL DAILY
Qty: 90 TABLET | Refills: 1 | Status: SHIPPED | OUTPATIENT
Start: 2025-02-03 | End: 2025-02-19

## 2025-02-03 ASSESSMENT — ANXIETY QUESTIONNAIRES
4. TROUBLE RELAXING: SEVERAL DAYS
5. BEING SO RESTLESS THAT IT IS HARD TO SIT STILL: NOT AT ALL
6. BECOMING EASILY ANNOYED OR IRRITABLE: MORE THAN HALF THE DAYS
GAD7 TOTAL SCORE: 12
7. FEELING AFRAID AS IF SOMETHING AWFUL MIGHT HAPPEN: MORE THAN HALF THE DAYS
3. WORRYING TOO MUCH ABOUT DIFFERENT THINGS: MORE THAN HALF THE DAYS
2. NOT BEING ABLE TO STOP OR CONTROL WORRYING: MORE THAN HALF THE DAYS
1. FEELING NERVOUS, ANXIOUS, OR ON EDGE: NEARLY EVERY DAY

## 2025-02-03 ASSESSMENT — ENCOUNTER SYMPTOMS
FEVER: 0
NERVOUS/ANXIOUS: 1
CHILLS: 0
SHORTNESS OF BREATH: 0

## 2025-02-03 ASSESSMENT — FIBROSIS 4 INDEX: FIB4 SCORE: 0.36

## 2025-02-03 NOTE — PATIENT INSTRUCTIONS
"Try the following medication for your congestion:      This is an antihistamine, similar to Benadryl but does not cause quite the same level of drowsiness.  This medication works quickly but also wears off quickly, often requiring repeat dosing throughout the day.  It can cause dry mouth.    Chlor-Trimeton is the brand name but you can get a generic version at [employer], [employer] as well as on [employer].    They are all green and white boxes and they are all yellow pills.    The generic medication name you want to look for is \"chlorpheniramine.\"    Flonase can also help.  Recommend 2 sprays, each nostril, twice daily.  This medication works best if you use it every day.        "

## 2025-02-03 NOTE — PROGRESS NOTES
SUBJECTIVE:     CC:     HPI:   Ivory presents today with the following:    ASSESSMENT & PLAN by Problem:     Problem   Bradycardia    Chronic, intermittent.  Ordering Zio patch.     Recurrent Sinus Infections    Chronic, intermittent.  Checking CT sinus.  Referring to ENT.  Discussed trying chlorpheniramine tablets and fluticasone nasal spray.     Lymphadenopathy    Chronic, ongoing.    Checking ultrasound.     Anxiety    Chronic, uncontrolled.  Slightly lowered KACY-7 score.  Referring to [facility] for therapy, per patient request.  Trial paroxetine 10 mg daily.  Patient will let me know how she does with this dose.     Cystinuria (Hcc)    Chronic, uncontrolled  Managed by [facility].  The referral to [facility] urology was not processed.  Sending again today.         Unremarkable CBC and CMP 1/7/2025 ED visit.    Patient is not bradycardic in clinic today.    Return if symptoms worsen or fail to improve.        HPI:     Problem List Items Addressed This Visit       Cystinuria (HCC)     Chronic, uncontrolled.  LUTS  ER, antibiotic   Felt a little better   Urine culture negative  + sunday  Has tried d-mannose years ago and prophylactic antibiotic   Both helped for a short period  Some urine retention  Doesn't pass stones, they are all too big  Sees [facility] [place], last saw them 10/2024  Referral placed to [facility] urology but something has held it up    Thiola   Lower dose wasn't helping lower cysteine levels  High dose caused proteinuria  Nephrology said stop taking the thiola  Urology said she needs it to keep cysteine levels down         Relevant Orders    Referral to Urology    Anxiety     Chronic, uncontrolled.  GAD12  Didn't click with the therapist she tried  Would like to see Valentin  Saw Ester there in the past for therapy for about 1.5 years  Buspirone didn't help (highest dose tried 15mg, didn't help)  Hydroxyzine used for panic attacks but too drowsy inducing  Tired: zoloft, lexapro, prozac, abilify (either didn't  "help or had side effects)  Doesn't feel depressed    Interval history 2/2024:  Chronic, controlled.   KACY-7: 15   Has been on medications in the past.  Has seen psychiatry and therapy in the past.  Asking for emotional support letter for her dog to help manage day to day issues.  Would like to see therapy again.  Has been using buspirone 10 mg as needed and it does seem to be helping without making her too drowsy.         Relevant Medications    PARoxetine (PAXIL) 10 MG Tab    Other Relevant Orders    Referral to Behavioral Health    Bradycardia     Chronic, intermittent.  Patient states every time she is hospitalized they are concerned about her low heart rate, specifically when she is sleeping.  She states it goes as low as the 30s.  Patient is fairly asymptomatic other than chronically somewhat low blood pressure and heart rate.         Relevant Orders    RIH ZIO PATCH MONITOR    Recurrent sinus infections     Chronic, intermittent.  Recurring sinus congestion/infections  Pneumonia 2 years in a row  Checking CT sinus.  Referring to ENT.         Relevant Orders    CT-LOCALIZATION LIMITED SINUSES    Referral to ENT    Lymphadenopathy     Chronic, ongoing.  Left submandibular lymph node larger than right side.  Started a couple years ago.  Nontender to palpation.  Denies unexplained weight loss.         Relevant Orders    US-SOFT TISSUES OF HEAD - NECK     Other Visit Diagnoses       Renal calculus        Relevant Orders    Referral to Urology                   ROS:  Review of Systems   Constitutional:  Negative for chills and fever.   HENT:  Positive for congestion.    Respiratory:  Negative for shortness of breath.    Cardiovascular:  Negative for chest pain.   Psychiatric/Behavioral:  The patient is nervous/anxious.        OBJECTIVE:     Exam:  /56 (BP Location: Right arm, Patient Position: Sitting, BP Cuff Size: Adult)   Pulse 74   Temp 36.9 °C (98.4 °F) (Temporal)   Resp 16   Ht 1.6 m (5' 3\")   Wt " 52.6 kg (116 lb)   LMP 12/15/2024 (Exact Date)   SpO2 98%   BMI 20.55 kg/m²  Body mass index is 20.55 kg/m².    Physical Exam  Vitals reviewed.   Constitutional:       General: She is not in acute distress.     Appearance: Normal appearance.   HENT:      Ears:      Comments: TMs are bulging bilaterally with serous effusions.  No signs of infection noted.     Nose:      Comments: Nasal mucosa is edematous bilaterally, right worse than left.  Pulmonary:      Effort: Pulmonary effort is normal.   Neurological:      General: No focal deficit present.      Mental Status: She is alert.   Psychiatric:         Mood and Affect: Mood normal.         Behavior: Behavior normal.         Judgment: Judgment normal.           CHART REVIEW:                               Please note that this dictation was created using voice recognition software. I have made every reasonable attempt to correct obvious errors, but I expect that there are errors of grammar and possibly content that I did not discover before finalizing the note.

## 2025-02-03 NOTE — ASSESSMENT & PLAN NOTE
Chronic, uncontrolled.  GAD12  Didn't click with the therapist she tried  Would like to see [facility]  Saw Ester there in the past for therapy for about 1.5 years  Buspirone didn't help (highest dose tried 15mg, didn't help)  Hydroxyzine used for panic attacks but too drowsy inducing  Tired: zoloft, lexapro, prozac, abilify (either didn't help or had side effects)  Doesn't feel depressed    Interval history 2/2024:  Chronic, controlled.   KCAY-7: 15   Has been on medications in the past.  Has seen psychiatry and therapy in the past.  Asking for emotional support letter for her dog to help manage day to day issues.  Would like to see therapy again.  Has been using buspirone 10 mg as needed and it does seem to be helping without making her too drowsy.

## 2025-02-03 NOTE — ASSESSMENT & PLAN NOTE
Chronic, intermittent.  Patient states every time she is hospitalized they are concerned about her low heart rate, specifically when she is sleeping.  She states it goes as low as the 30s.  Patient is fairly asymptomatic other than chronically somewhat low blood pressure and heart rate.

## 2025-02-03 NOTE — ASSESSMENT & PLAN NOTE
Chronic, uncontrolled.  LUTS  ER, antibiotic   Felt a little better   Urine culture negative  + sunday  Has tried d-mannose years ago and prophylactic antibiotic   Both helped for a short period  Some urine retention  Doesn't pass stones, they are all too big  Sees urology [place], last saw them 10/2024  Referral placed to [facility]rology but something has held it up    Thiola   Lower dose wasn't helping lower cysteine levels  High dose caused proteinuria  Nephrology said stop taking the thiola  Urology said she needs it to keep cysteine levels down

## 2025-02-03 NOTE — ASSESSMENT & PLAN NOTE
Chronic, intermittent.  Recurring sinus congestion/infections  Pneumonia 2 years in a row  Checking CT sinus.  Referring to ENT.

## 2025-02-07 ENCOUNTER — NON-PROVIDER VISIT (OUTPATIENT)
Dept: CARDIOLOGY | Facility: MEDICAL CENTER | Age: 20
End: 2025-02-07
Attending: PHYSICIAN ASSISTANT
Payer: COMMERCIAL

## 2025-02-07 DIAGNOSIS — R00.1 BRADYCARDIA: ICD-10-CM

## 2025-02-07 PROCEDURE — 93246 EXT ECG>7D<15D RECORDING: CPT

## 2025-02-08 NOTE — PROGRESS NOTES
Patient enrolled in the 14 day Zio Holter monitor per Ester Lopez PA-C.  In clinic hook up, monitor s/n [id number].  Pending EOS.

## 2025-02-13 ENCOUNTER — APPOINTMENT (OUTPATIENT)
Dept: URGENT CARE | Facility: PHYSICIAN GROUP | Age: 20
End: 2025-02-13
Payer: COMMERCIAL

## 2025-02-13 ENCOUNTER — OFFICE VISIT (OUTPATIENT)
Dept: URGENT CARE | Facility: PHYSICIAN GROUP | Age: 20
End: 2025-02-13
Payer: COMMERCIAL

## 2025-02-13 VITALS
BODY MASS INDEX: 20.02 KG/M2 | RESPIRATION RATE: 16 BRPM | DIASTOLIC BLOOD PRESSURE: 74 MMHG | OXYGEN SATURATION: 95 % | HEART RATE: 73 BPM | HEIGHT: 63 IN | SYSTOLIC BLOOD PRESSURE: 114 MMHG | TEMPERATURE: 97.9 F | WEIGHT: 112.99 LBS

## 2025-02-13 DIAGNOSIS — Z03.818 ENCOUNTER FOR PATIENT CONCERN ABOUT EXPOSURE TO INFECTIOUS ORGANISM: ICD-10-CM

## 2025-02-13 DIAGNOSIS — J10.1 INFLUENZA A: ICD-10-CM

## 2025-02-13 LAB
FLUAV RNA SPEC QL NAA+PROBE: POSITIVE
FLUBV RNA SPEC QL NAA+PROBE: NEGATIVE
RSV RNA SPEC QL NAA+PROBE: NEGATIVE
SARS-COV-2 RNA RESP QL NAA+PROBE: NEGATIVE

## 2025-02-13 PROCEDURE — 0241U POCT CEPHEID COV-2, FLU A/B, RSV - PCR: CPT | Performed by: FAMILY MEDICINE

## 2025-02-13 PROCEDURE — 3074F SYST BP LT 130 MM HG: CPT | Performed by: FAMILY MEDICINE

## 2025-02-13 PROCEDURE — 99213 OFFICE O/P EST LOW 20 MIN: CPT | Performed by: FAMILY MEDICINE

## 2025-02-13 PROCEDURE — 3078F DIAST BP <80 MM HG: CPT | Performed by: FAMILY MEDICINE

## 2025-02-13 ASSESSMENT — FIBROSIS 4 INDEX: FIB4 SCORE: 0.36

## 2025-02-13 NOTE — LETTER
February 13, 2025    To Whom It May Concern:         This is confirmation that Ivory Chávez attended her scheduled appointment with Keke Lynn M.D. on 2/13/25. She may return to work when she's had no fever for greater than 24 hours without the help of medication.          If you have any questions please do not hesitate to call me at the phone number listed below.    Sincerely,          Keke Lynn M.D.  [phone]

## 2025-02-14 ENCOUNTER — RESULTS FOLLOW-UP (OUTPATIENT)
Dept: URGENT CARE | Facility: CLINIC | Age: 20
End: 2025-02-14

## 2025-02-14 NOTE — PROGRESS NOTES
"  Subjective:      20 y.o. female presents to urgent care for cold symptoms that started on Monday.  She is experiencing fever, headache, body ache, increased sinus pressure, and cough.  No sore throat or diarrhea.  No tobacco product use.  No history of asthma or COPD.  She is vaccinated against COVID.  No known sick contacts.    She denies any other questions or concerns at this time.    Current problem list, medication, and past medical/surgical history were reviewed in Epic.    ROS  See HPI     Objective:      /74 (BP Location: Left arm, Patient Position: Sitting, BP Cuff Size: Adult)   Pulse 73   Temp 36.6 °C (97.9 °F) (Temporal)   Resp 16   Ht 1.6 m (5' 3\")   Wt 51.2 kg (112 lb 15.8 oz)   LMP 12/15/2024 (Exact Date)   SpO2 95%   BMI 20.01 kg/m²     Physical Exam  Constitutional:       General: She is not in acute distress.     Appearance: She is not diaphoretic.   HENT:      Right Ear: Tympanic membrane, ear canal and external ear normal.      Left Ear: Tympanic membrane, ear canal and external ear normal.      Mouth/Throat:      Tongue: Tongue does not deviate from midline.      Palate: No lesions.      Pharynx: Uvula midline. No oropharyngeal exudate or posterior oropharyngeal erythema.      Tonsils: No tonsillar exudate.   Cardiovascular:      Rate and Rhythm: Normal rate and regular rhythm.      Heart sounds: Normal heart sounds.   Pulmonary:      Effort: Pulmonary effort is normal. No respiratory distress.      Breath sounds: Normal breath sounds.   Neurological:      Mental Status: She is alert.   Psychiatric:         Mood and Affect: Affect normal.         Judgment: Judgment normal.       Assessment/Plan:     1. Influenza A  2. Encounter for patient concern about exposure to infectious organism  Positive for Influenza A. She is out of the window for treatment with Tamiflu. Tylenol, ibuprofen, rest, and hydration as needed.   - POCT CEPHEID COV-2, FLU A/B, RSV - PCR      Instructed to " return to Urgent Care or nearest Emergency Department if symptoms fail to improve, for any change in condition, further concerns, or new concerning symptoms. Patient states understanding of the plan of care and discharge instructions.    Keke Lynn M.D.

## 2025-02-19 DIAGNOSIS — F41.9 ANXIETY: ICD-10-CM

## 2025-02-19 RX ORDER — VENLAFAXINE HYDROCHLORIDE 37.5 MG/1
37.5 CAPSULE, EXTENDED RELEASE ORAL DAILY
Qty: 90 CAPSULE | Refills: 1 | Status: SHIPPED | OUTPATIENT
Start: 2025-02-19

## 2025-02-19 RX ORDER — PROPRANOLOL HYDROCHLORIDE 10 MG/1
10-20 TABLET ORAL 3 TIMES DAILY PRN
Qty: 90 TABLET | Refills: 5 | Status: SHIPPED | OUTPATIENT
Start: 2025-02-19 | End: 2025-02-28

## 2025-02-28 RX ORDER — PROPRANOLOL HYDROCHLORIDE 10 MG/1
10-20 TABLET ORAL 3 TIMES DAILY PRN
Qty: 540 TABLET | Refills: 1 | Status: SHIPPED | OUTPATIENT
Start: 2025-02-28

## 2025-03-05 NOTE — Clinical Note
Member Name: Ivory Chávez   Member Number: [id number]   Reference Number: [id number]   Approved Services: MRI/CAT Scan   Approved Service Dates: 02/03/2025 - 06/26/2025   Requesting Provider: Ester Lopez   Requested Provider: [facility]     Dear Ivory Chávez:     The following medical service(s) requested by Ester Lopez have been approved:    Procedure Code Procedure Code Name Requested Quantity Approved Quantity Status   59289 (CPT®) MO CT SCAN,MAXILLOFACIAL AREA,W/O CONTRAST 1 1 Authorized       Approved Quantity means the number of visits approved for medication treatments and/or medical services.    The services should be provided by [facility] no later than 06/26/2025. Please contact the provider listed below with any questions.     Provider Information:  [facility]  [phone]    Your plan benefit may require a deductible, co-payment or coinsurance for these services. This authorization does not guarantee [facility] will pay the claim for services that you receive. Payment by [facility] for these services is subject to the terms of your Evidence of Coverage or Summary Plan Description, your eligibility at the time of service, and confirmation of benefit coverage.    For any questions or additional information, please contact Customer Service:    [facility]  Customer Service: [phone] or toll free [phone]  TTY users dial: [phone]   Call Center Hours: Mon - Fri 7 AM to 8 PM PST   Office Hours: Mon - Fri 8 AM to 5 PM [facility]   E-mail: [email or web address]   Website: [email or web address]       This information is available for free in other languages. Please contact Customer Service at the phone number above for more information. [facility] complies with applicable Federal civil rights laws and does not discriminate on the basis of race, color, national origin, age, disability or  sex.      Sincerely,     Healthcare Utilization Management Department     Cc: [facility]   Ester Lopez

## 2025-03-05 NOTE — Clinical Note
Member Name: Ivory Chváez   Member Number: [id number]   Reference Number: [id number]   Approved Services: MRI/CAT Scan   Approved Service Dates: 02/03/2025 - 06/26/2025   Requesting Provider: Ester Lopez   Requested Provider: [facility]     Dear Ivory Chávez:     The following medical service(s) requested by Ester Lopez have been approved:    Number of Services: 1  Description: MRI/CAT Scan  CT scan of face without contrast     The services should be provided by [facility] no later than 06/26/2025. Please contact the provider listed below with any questions.     Provider Information:  [facility]  [phone]    Your plan benefit may require a deductible, co-payment or coinsurance for these services. This authorization does not guarantee [facility] will pay the claim for services that you receive. Payment by [facility] for these services is subject to the terms of your Evidence of Coverage or Summary Plan Description, your eligibility at the time of service, and confirmation of benefit coverage.    For any questions or additional information, please contact Customer Service:    [facility]  Customer Service: [phone] or toll free [phone]  TTY users dial: [phone]   Call Center Hours: Mon - Fri 7 AM to 8 PM PST   Office Hours: Mon - Fri 8 AM to 5 PM [facility]   E-mail: [email or web address]   Website: [email or web address]       This information is available for free in other languages. Please contact Customer Service at the phone number above for more information. [facility] complies with applicable Federal civil rights laws and does not discriminate on the basis of race, color, national origin, age, disability or sex.      Sincerely,     Healthcare Utilization Management Department     Cc: [facility]   Ester Lopez

## 2025-03-06 ENCOUNTER — PHARMACY VISIT (OUTPATIENT)
Dept: PHARMACY | Facility: MEDICAL CENTER | Age: 20
End: 2025-03-06
Payer: COMMERCIAL

## 2025-03-06 ENCOUNTER — HOSPITAL ENCOUNTER (EMERGENCY)
Facility: MEDICAL CENTER | Age: 20
End: 2025-03-06
Attending: STUDENT IN AN ORGANIZED HEALTH CARE EDUCATION/TRAINING PROGRAM
Payer: COMMERCIAL

## 2025-03-06 ENCOUNTER — APPOINTMENT (OUTPATIENT)
Dept: RADIOLOGY | Facility: MEDICAL CENTER | Age: 20
End: 2025-03-06
Attending: STUDENT IN AN ORGANIZED HEALTH CARE EDUCATION/TRAINING PROGRAM
Payer: COMMERCIAL

## 2025-03-06 VITALS
WEIGHT: 116.4 LBS | SYSTOLIC BLOOD PRESSURE: 115 MMHG | RESPIRATION RATE: 18 BRPM | HEART RATE: 76 BPM | OXYGEN SATURATION: 95 % | BODY MASS INDEX: 20.62 KG/M2 | DIASTOLIC BLOOD PRESSURE: 70 MMHG | TEMPERATURE: 97.2 F | HEIGHT: 63 IN

## 2025-03-06 DIAGNOSIS — R11.2 NAUSEA AND VOMITING, UNSPECIFIED VOMITING TYPE: ICD-10-CM

## 2025-03-06 DIAGNOSIS — N20.0 NEPHROLITHIASIS: ICD-10-CM

## 2025-03-06 DIAGNOSIS — R31.9 URINARY TRACT INFECTION WITH HEMATURIA, SITE UNSPECIFIED: ICD-10-CM

## 2025-03-06 DIAGNOSIS — N39.0 URINARY TRACT INFECTION WITH HEMATURIA, SITE UNSPECIFIED: ICD-10-CM

## 2025-03-06 LAB
ALBUMIN SERPL BCP-MCNC: 4.6 G/DL (ref 3.2–4.9)
ALBUMIN/GLOB SERPL: 1.7 G/DL
ALP SERPL-CCNC: 58 U/L (ref 30–99)
ALT SERPL-CCNC: 11 U/L (ref 2–50)
ANION GAP SERPL CALC-SCNC: 13 MMOL/L (ref 7–16)
APPEARANCE UR: ABNORMAL
AST SERPL-CCNC: 21 U/L (ref 12–45)
BACTERIA #/AREA URNS HPF: ABNORMAL /HPF
BASOPHILS # BLD AUTO: 0.8 % (ref 0–1.8)
BASOPHILS # BLD: 0.05 K/UL (ref 0–0.12)
BILIRUB SERPL-MCNC: 0.4 MG/DL (ref 0.1–1.5)
BILIRUB UR QL STRIP.AUTO: NEGATIVE
BUN SERPL-MCNC: 18 MG/DL (ref 8–22)
CALCIUM ALBUM COR SERPL-MCNC: 8.9 MG/DL (ref 8.5–10.5)
CALCIUM SERPL-MCNC: 9.4 MG/DL (ref 8.5–10.5)
CASTS URNS QL MICRO: ABNORMAL /LPF (ref 0–2)
CHLORIDE SERPL-SCNC: 104 MMOL/L (ref 96–112)
CO2 SERPL-SCNC: 22 MMOL/L (ref 20–33)
COLOR UR: YELLOW
CREAT SERPL-MCNC: 0.99 MG/DL (ref 0.5–1.4)
EOSINOPHIL # BLD AUTO: 0.21 K/UL (ref 0–0.51)
EOSINOPHIL NFR BLD: 3.3 % (ref 0–6.9)
EPITHELIAL CELLS 1715: ABNORMAL /HPF (ref 0–5)
ERYTHROCYTE [DISTWIDTH] IN BLOOD BY AUTOMATED COUNT: 41.6 FL (ref 35.9–50)
GFR SERPLBLD CREATININE-BSD FMLA CKD-EPI: 83 ML/MIN/1.73 M 2
GLOBULIN SER CALC-MCNC: 2.7 G/DL (ref 1.9–3.5)
GLUCOSE SERPL-MCNC: 100 MG/DL (ref 65–99)
GLUCOSE UR STRIP.AUTO-MCNC: NEGATIVE MG/DL
HCG SERPL QL: NEGATIVE
HCT VFR BLD AUTO: 43.6 % (ref 37–47)
HGB BLD-MCNC: 14.5 G/DL (ref 12–16)
IMM GRANULOCYTES # BLD AUTO: 0.01 K/UL (ref 0–0.11)
IMM GRANULOCYTES NFR BLD AUTO: 0.2 % (ref 0–0.9)
KETONES UR STRIP.AUTO-MCNC: NEGATIVE MG/DL
LACTATE SERPL-SCNC: 1.2 MMOL/L (ref 0.5–2)
LEUKOCYTE ESTERASE UR QL STRIP.AUTO: ABNORMAL
LIPASE SERPL-CCNC: 30 U/L (ref 11–82)
LYMPHOCYTES # BLD AUTO: 2.31 K/UL (ref 1–4.8)
LYMPHOCYTES NFR BLD: 36.3 % (ref 22–41)
MCH RBC QN AUTO: 31.5 PG (ref 27–33)
MCHC RBC AUTO-ENTMCNC: 33.3 G/DL (ref 32.2–35.5)
MCV RBC AUTO: 94.8 FL (ref 81.4–97.8)
MICRO URNS: ABNORMAL
MONOCYTES # BLD AUTO: 0.54 K/UL (ref 0–0.85)
MONOCYTES NFR BLD AUTO: 8.5 % (ref 0–13.4)
NEUTROPHILS # BLD AUTO: 3.25 K/UL (ref 1.82–7.42)
NEUTROPHILS NFR BLD: 50.9 % (ref 44–72)
NITRITE UR QL STRIP.AUTO: NEGATIVE
NRBC # BLD AUTO: 0 K/UL
NRBC BLD-RTO: 0 /100 WBC (ref 0–0.2)
PH UR STRIP.AUTO: 8 [PH] (ref 5–8)
PLATELET # BLD AUTO: 229 K/UL (ref 164–446)
PMV BLD AUTO: 8.9 FL (ref 9–12.9)
POTASSIUM SERPL-SCNC: 4.3 MMOL/L (ref 3.6–5.5)
PROT SERPL-MCNC: 7.3 G/DL (ref 6–8.2)
PROT UR QL STRIP: NEGATIVE MG/DL
RBC # BLD AUTO: 4.6 M/UL (ref 4.2–5.4)
RBC # URNS HPF: ABNORMAL /HPF (ref 0–2)
RBC UR QL AUTO: ABNORMAL
SODIUM SERPL-SCNC: 139 MMOL/L (ref 135–145)
SP GR UR STRIP.AUTO: 1.01
UROBILINOGEN UR STRIP.AUTO-MCNC: 0.2 EU/DL
WBC # BLD AUTO: 6.4 K/UL (ref 4.8–10.8)
WBC #/AREA URNS HPF: ABNORMAL /HPF

## 2025-03-06 PROCEDURE — 96374 THER/PROPH/DIAG INJ IV PUSH: CPT

## 2025-03-06 PROCEDURE — 36415 COLL VENOUS BLD VENIPUNCTURE: CPT

## 2025-03-06 PROCEDURE — 83605 ASSAY OF LACTIC ACID: CPT

## 2025-03-06 PROCEDURE — 81001 URINALYSIS AUTO W/SCOPE: CPT

## 2025-03-06 PROCEDURE — 84703 CHORIONIC GONADOTROPIN ASSAY: CPT

## 2025-03-06 PROCEDURE — 85025 COMPLETE CBC W/AUTO DIFF WBC: CPT

## 2025-03-06 PROCEDURE — 96375 TX/PRO/DX INJ NEW DRUG ADDON: CPT

## 2025-03-06 PROCEDURE — 87040 BLOOD CULTURE FOR BACTERIA: CPT | Mod: 91

## 2025-03-06 PROCEDURE — 96376 TX/PRO/DX INJ SAME DRUG ADON: CPT

## 2025-03-06 PROCEDURE — 700105 HCHG RX REV CODE 258: Performed by: STUDENT IN AN ORGANIZED HEALTH CARE EDUCATION/TRAINING PROGRAM

## 2025-03-06 PROCEDURE — 700111 HCHG RX REV CODE 636 W/ 250 OVERRIDE (IP): Mod: JZ | Performed by: STUDENT IN AN ORGANIZED HEALTH CARE EDUCATION/TRAINING PROGRAM

## 2025-03-06 PROCEDURE — RXMED WILLOW AMBULATORY MEDICATION CHARGE: Performed by: STUDENT IN AN ORGANIZED HEALTH CARE EDUCATION/TRAINING PROGRAM

## 2025-03-06 PROCEDURE — 83690 ASSAY OF LIPASE: CPT

## 2025-03-06 PROCEDURE — 99284 EMERGENCY DEPT VISIT MOD MDM: CPT

## 2025-03-06 PROCEDURE — 80053 COMPREHEN METABOLIC PANEL: CPT

## 2025-03-06 PROCEDURE — 76775 US EXAM ABDO BACK WALL LIM: CPT

## 2025-03-06 RX ORDER — KETOROLAC TROMETHAMINE 10 MG/1
10 TABLET, FILM COATED ORAL EVERY 4 HOURS PRN
Qty: 30 TABLET | Refills: 0 | Status: SHIPPED | OUTPATIENT
Start: 2025-03-06

## 2025-03-06 RX ORDER — SULFAMETHOXAZOLE AND TRIMETHOPRIM 800; 160 MG/1; MG/1
1 TABLET ORAL 2 TIMES DAILY
Qty: 14 TABLET | Refills: 0 | Status: ACTIVE | OUTPATIENT
Start: 2025-03-06 | End: 2025-03-13

## 2025-03-06 RX ORDER — MORPHINE SULFATE 4 MG/ML
2 INJECTION INTRAVENOUS ONCE
Status: COMPLETED | OUTPATIENT
Start: 2025-03-06 | End: 2025-03-06

## 2025-03-06 RX ORDER — ACETAMINOPHEN 10 MG/ML
1000 INJECTION, SOLUTION INTRAVENOUS ONCE
Status: COMPLETED | OUTPATIENT
Start: 2025-03-06 | End: 2025-03-06

## 2025-03-06 RX ORDER — ONDANSETRON 2 MG/ML
4 INJECTION INTRAMUSCULAR; INTRAVENOUS ONCE
Status: COMPLETED | OUTPATIENT
Start: 2025-03-06 | End: 2025-03-06

## 2025-03-06 RX ORDER — PHENAZOPYRIDINE HYDROCHLORIDE 200 MG/1
200 TABLET, FILM COATED ORAL 3 TIMES DAILY PRN
Qty: 6 TABLET | Refills: 0 | Status: SHIPPED | OUTPATIENT
Start: 2025-03-06

## 2025-03-06 RX ORDER — MORPHINE SULFATE 4 MG/ML
4 INJECTION INTRAVENOUS ONCE
Status: COMPLETED | OUTPATIENT
Start: 2025-03-06 | End: 2025-03-06

## 2025-03-06 RX ORDER — ONDANSETRON 4 MG/1
4 TABLET, ORALLY DISINTEGRATING ORAL EVERY 6 HOURS PRN
Qty: 10 TABLET | Refills: 0 | Status: SHIPPED | OUTPATIENT
Start: 2025-03-06

## 2025-03-06 RX ORDER — CEFTRIAXONE 1 G/1
1000 INJECTION, POWDER, FOR SOLUTION INTRAMUSCULAR; INTRAVENOUS ONCE
Status: COMPLETED | OUTPATIENT
Start: 2025-03-06 | End: 2025-03-06

## 2025-03-06 RX ORDER — KETOROLAC TROMETHAMINE 15 MG/ML
15 INJECTION, SOLUTION INTRAMUSCULAR; INTRAVENOUS ONCE
Status: COMPLETED | OUTPATIENT
Start: 2025-03-06 | End: 2025-03-06

## 2025-03-06 RX ORDER — SODIUM CHLORIDE 9 MG/ML
1000 INJECTION, SOLUTION INTRAVENOUS ONCE
Status: COMPLETED | OUTPATIENT
Start: 2025-03-06 | End: 2025-03-06

## 2025-03-06 RX ADMIN — MORPHINE SULFATE 2 MG: 4 INJECTION, SOLUTION INTRAMUSCULAR; INTRAVENOUS at 04:57

## 2025-03-06 RX ADMIN — KETOROLAC TROMETHAMINE 15 MG: 15 INJECTION, SOLUTION INTRAMUSCULAR; INTRAVENOUS at 06:47

## 2025-03-06 RX ADMIN — ONDANSETRON 4 MG: 2 INJECTION INTRAMUSCULAR; INTRAVENOUS at 03:31

## 2025-03-06 RX ADMIN — ACETAMINOPHEN 1000 MG: 1000 INJECTION, SOLUTION INTRAVENOUS at 04:59

## 2025-03-06 RX ADMIN — MORPHINE SULFATE 4 MG: 4 INJECTION, SOLUTION INTRAMUSCULAR; INTRAVENOUS at 03:32

## 2025-03-06 RX ADMIN — CEFTRIAXONE SODIUM 1000 MG: 1 INJECTION, POWDER, FOR SOLUTION INTRAMUSCULAR; INTRAVENOUS at 06:48

## 2025-03-06 RX ADMIN — SODIUM CHLORIDE 1000 ML: 9 INJECTION, SOLUTION INTRAVENOUS at 03:37

## 2025-03-06 ASSESSMENT — PAIN DESCRIPTION - PAIN TYPE
TYPE: ACUTE PAIN

## 2025-03-06 ASSESSMENT — FIBROSIS 4 INDEX: FIB4 SCORE: 0.36

## 2025-03-06 NOTE — ED NOTES
Lab called and said they need a recollection of lactic as they received the blood sample just 10 minutes ago?right after I called the lab station to ff up my blood that was sent at 03:25AM

## 2025-03-06 NOTE — ED TRIAGE NOTES
Ivory Chávez  20 y.o.  female  Chief Complaint   Patient presents with    Flank Pain     C/o left flank pain off and on x 4 days. + Blood in urine.     N/V     Started tonight and worse now     Hx of Kidney stones. Also c/o bladder pain described as sharp pains.

## 2025-03-06 NOTE — ED NOTES
Discharged in stable condition, alert and oriented, ambulatory. Accompanied by family.  Prescribed medication and follow up appointment instructed. Health education imparted. Instructed to come back once symptoms worsened. Pt verbalized understanding of the information given. Removed IV line and applied pressure.

## 2025-03-06 NOTE — ED PROVIDER NOTES
"  ER Provider Note    Scribed for Dameon Gil M.D. by Hermelindo Wu. 3/6/2025   3:09 AM    Primary Care Provider: Ester Lopez P.A.-C.    CHIEF COMPLAINT  Chief Complaint   Patient presents with    Flank Pain     C/o left flank pain off and on x 4 days. + Blood in urine.     N/V     Started tonight and worse now     EXTERNAL RECORDS REVIEWED  The patient's records show the patient was admitted 7/22/24 for an obstructive uropathy. Dr. Cornejo with urology was consulted. She had an 11 mm right UPJ stone. CT on 1/7/25 showed a punctate right kidney stone and 3 by 6 mm stones on the left    HPI/ROS  LIMITATION TO HISTORY   Select: : None  OUTSIDE HISTORIAN(S):  Friend present at bedside    Ivory Chávez is a 20 y.o. female who presents to the ED for evaluation of bilateral flank pain and hematuria onset 4 days ago. The patient explains that her symptoms first started with blood in her urine 4 days ago. She then developed bladder pain a few days ago and bilateral flank pain yesterday. Furthermore she reports he pain worsening yesterday and again today which prompted visitation to the ED. She has a history of Cystinuria and multiple stones but the patient reports never having passed a stone until these recent symptoms where she passed multiple stones that are describes as \"sand in my urine\". States associated burning with urination and vomiting/nausea. Furthermore she explains that she last ate at 7 PM last night and reports her vomiting and nausea beginning around 3 hours ago. Denies any fevers or urinary retention but explains that she is \"inputting more than outputting\". Patient has a history of frequent UTI's with resistant bacteria in her urine and she adds that her current symptoms feel similar to this. No drug allergies are reported. Patient's LMP was 3 days ago which has since concluded. She has no concerns for STD or pregnancy. Patient is followed by Dr. Harrison (Urology) who she is scheduled to see " on Friday. She was last seen by Urology last month     PAST MEDICAL HISTORY  Past Medical History:   Diagnosis Date    Allergy     latex    Anxiety     Cystinuria (HCC)     Cystinuria (HCC)     Nondisplaced bimalleolar fracture of left lower leg, initial encounter for closed fracture 2007    left lower leg fracture, splinted, no surgery    Psychiatric problem     OCD, anxiety, bipolar    Renal disorder     Kidney stone     Urinary tract infection     pt states she frequent UTIs due to the kidney stones     SURGICAL HISTORY  Past Surgical History:   Procedure Laterality Date    IA CYSTOSCOPY,INSERT URETERAL STENT Right 7/22/2024    Procedure: CYSTOSCOPY, WITH URETERAL STENT INSERTION;  Surgeon: Darren Cornejo M.D.;  Location: [facility];  Service: Urology    CYSTOSCOPY WITH URETERAL STENT INSERTION OR REMOVAL Bilateral 8/27/2023    Procedure: CYSTOSCOPY, WITH URETERAL STENT INSERTION OR REMOVAL;  Surgeon: Danny Harrison M.D.;  Location: [facility];  Service: Urology    LASERTRIPSY Bilateral 8/27/2023    Procedure: LITHOTRIPSY, USING LASER;  Surgeon: Danny Harrison M.D.;  Location: [facility];  Service: Urology    IA CYSTOSCOPY,INSERT URETERAL STENT N/A 3/16/2023    Procedure: CYSTOSCOPY;  Surgeon: Nick Ramirez M.D.;  Location: [facility];  Service: Urology    IA CYSTO/URETERO/PYELOSCOPY, DX Left 3/16/2023    Procedure: URETEROSCOPY;  Surgeon: Nick Ramirez M.D.;  Location: [facility];  Service: Urology    IA CYSTOSCOPY,INSERT URETERAL STENT Left 3/7/2022    Procedure: CYSTOSCOPY, WITH URETERAL STENT INSERTION;  Surgeon: Nick Ramirez M.D.;  Location: [facility];  Service: Urology    IA CYSTO/URETERO/PYELOSCOPY, DX Left 3/7/2022    Procedure: URETEROSCOPY;  Surgeon: Nick Ramirez M.D.;  Location: [facility];  Service: Urology    LASERTRIPSY Left 3/7/2022    Procedure: LITHOTRIPSY, USING LASER;  Surgeon: Nick Ramirez M.D.;  Location:  [facility];  Service: Urology    MA CYSTOSCOPY,INSERT URETERAL STENT Left 2/24/2022    Procedure: CYSTOSCOPY, WITH URETERAL STENT INSERTION;  Surgeon: Baljeet Yung M.D.;  Location: [facility];  Service: Urology    OTHER ORTHOPEDIC SURGERY  2021    foot - bone shaved    MA CYSTOSCOPY,INSERT URETERAL STENT Left 7/1/2020    Procedure: CYSTOSCOPY, WITH URETERAL STENT INSERTION;  Surgeon: Rafiq Gómez M.D.;  Location: [facility];  Service: Urology    MA CYSTO/URETERO/PYELOSCOPY, DX Left 7/1/2020    Procedure: URETEROSCOPY;  Surgeon: Rafiq Gómez M.D.;  Location: [facility];  Service: Urology    LASERTRIPSY  7/1/2020    Procedure: LITHOTRIPSY, USING LASER;  Surgeon: Rafiq Gómez M.D.;  Location: [facility];  Service: Urology    MA CYSTOSCOPY,INSERT URETERAL STENT Left 8/19/2019    Procedure: CYSTOSCOPY, WITH URETERAL STENT INSERTION;  Surgeon: Rafiq Gómez M.D.;  Location: [facility];  Service: Urology    LASERTRIPSY Left 8/19/2019    Procedure: LITHOTRIPSY, USING LASER;  Surgeon: Rafiq Gómez M.D.;  Location: [facility];  Service: Urology    URETEROSCOPY Left 11/13/2018    Procedure: URETEROSCOPY;  Surgeon: Francisco Sherman M.D.;  Location: [facility];  Service: Urology    STENT PLACEMENT Left 11/13/2018    Procedure: STENT PLACEMENT;  Surgeon: Francisco Sherman M.D.;  Location: [facility];  Service: Urology    CYSTOSCOPY  11/13/2018    Procedure: CYSTOSCOPY;  Surgeon: Francisco Sherman M.D.;  Location: [facility];  Service: Urology    LITHOTRIPSY Left 11/13/2018    Procedure: LITHOTRIPSY;  Surgeon: Francisco Sherman M.D.;  Location: [facility];  Service: Urology    PERCUTANEOUS NEPHROSTOLITHOTOMY Left 11/5/2018    Procedure: PERCUTANEOUS NEPHROSTOLITHOTOMY (PCNL);  Surgeon: Danny Harrison M.D.;  Location: [facility];  Service:  "Urology    STENT REPLACEMENT Left 11/5/2018    Procedure: STENT REPLACEMENT;  Surgeon: Danny Harrison M.D.;  Location: [facility];  Service: Urology    OTHER  2006    cyst excision to buttock     FAMILY HISTORY  Family History   Problem Relation Age of Onset    No Known Problems Mother     No Known Problems Father     No Known Problems Sister     No Known Problems Sister     No Known Problems Brother     No Known Problems Maternal Grandmother     No Known Problems Maternal Grandfather     No Known Problems Paternal Grandmother     No Known Problems Paternal Grandfather     Cancer Neg Hx     Diabetes Neg Hx     Heart Disease Neg Hx     Hyperlipidemia Neg Hx     Hypertension Neg Hx     Stroke Neg Hx      SOCIAL HISTORY   reports that she has never smoked. She has never used smokeless tobacco. She reports current alcohol use. She reports that she does not use drugs.    CURRENT MEDICATIONS  Previous Medications    ALBUTEROL 108 (90 BASE) MCG/ACT AERO SOLN INHALATION AEROSOL    Inhale 2 Puffs every 6 hours as needed for Shortness of Breath.    CETIRIZINE (ZYRTEC) 10 MG TAB    Take 10 mg by mouth 1 time a day as needed for Allergies.    EPINEPHRINE (EPIPEN) 0.3 MG/0.3ML SOLUTION AUTO-INJECTOR SOLUTION FOR INJECTION    INJECT THE CONTENTS OF THE EPIPEN INTO THE THIGH, HOLD FOR 3 SECONDS AND RELEASE FROM THIGH.    MULTIVITAMIN TAB    Take 1 Tablet by mouth every day.    OMEPRAZOLE (PRILOSEC) 40 MG DELAYED-RELEASE CAPSULE    Take 40 mg by mouth 1 time a day as needed (stomach).    PROPRANOLOL (INDERAL) 10 MG TAB    TAKE 1-2 TABLETS BY MOUTH 3 TIMES A DAY AS NEEDED (ANXIETY).    VENLAFAXINE XR (EFFEXOR XR) 37.5 MG CAPSULE SR 24 HR    Take 1 Capsule by mouth every day.     ALLERGIES  Allergies   Allergen Reactions    Latex Anaphylaxis      PHYSICAL EXAM  BP (!) 149/92   Pulse (!) 104   Temp 36.4 °C (97.6 °F) (Temporal)   Resp 16   Ht 1.6 m (5' 3\")   Wt 52.8 kg (116 lb 6.5 oz)   LMP 03/04/2025   SpO2 98%   " BMI 20.62 kg/m²      General: Uncomfortable appearing female who is tearful at bedside  Head: Normocephalic atraumatic  Eyes: Extraocular motion intact  Neck: Supple, no rigidity  Cardiovascular: Regular rate and rhythm no murmurs rubs or gallops  Respiratory: Clear to auscultation bilaterally, equal chest rise and fall, no increased work of breathing  Abdomen: Soft nontender no guarding  Back: Bilateral CVA tenderness right greater than left   Musculoskeletal: Warm and well perfused, no peripheral edema  Neuro: Alert, no focal deficits  Integumentary: No wounds or rashes    DIAGNOSTIC STUDIES    Labs:   Labs Reviewed   URINALYSIS,CULTURE IF INDICATED - Abnormal; Notable for the following components:       Result Value    Character Cloudy (*)     Leukocyte Esterase Small (*)     Occult Blood Small (*)     All other components within normal limits   CBC WITH DIFFERENTIAL - Abnormal; Notable for the following components:    MPV 8.9 (*)     All other components within normal limits   COMP METABOLIC PANEL - Abnormal; Notable for the following components:    Glucose 100 (*)     All other components within normal limits   URINE MICROSCOPIC (W/UA) - Abnormal; Notable for the following components:    WBC 6-10 (*)     Bacteria Moderate (*)     All other components within normal limits   LIPASE   HCG QUAL SERUM   LACTIC ACID   ESTIMATED GFR   BLOOD CULTURE   BLOOD CULTURE     Radiology:     Radiologist interpretation:   US-RENAL   Final Result         1.  Bilateral nephrolithiasis.   2.  Mild dilatation bilateral renal pelvis, could represent extrarenal pelvis morphology versus mild hydronephrosis.         INITIAL ASSESSMENT COURSE AND PLAN  Care Narrative     3:09 AM - Patient was evaluated at bedside. They present for bilateral flank pain, bladder pain, and hematuria. Pertinent PE findings include: Bilateral CVA tenderness right greater than left, uncomfortable female who is tearful. Plan for labs and imaging to evaluate  and medication/IV fluids for treatment. Differential diagnoses include but not limited to: Pyelonephritis versus urinary obstruction versus dehydration versus sepsis.      No sepsis, no endorgan dysfunction, lactate is not elevated, tachycardia resolved with pain medications.    Labs notable for no leukocytosis, lactate within normal limits, urinalysis shows small leuk esterase, 6-10 WBCs with moderate bacteria, given symptoms, will treat for lower urinary tract infection.  There is potentially mild hydro versus extrarenal pelvis on ultrasound.    Given these findings in conjunction with recent CT which showed no large stones on the right, the area of primary pain, I have a overall low suspicion for obstructing infected stone.         - Patient was reevaluated at bedside. Reviewed patient's laboratory, imaging results at the bedside, she does have 2 urology appointments next week, none this week.  We did discuss calling her primary urologist, to discuss her recent workup/imaging, how to control pain at home, need for taking antibiotics, need to return immediately in the unlikely event that blood cultures result positive, and plan of care if not improving in 48 hours.      Patient is agreeable to discharge, we discussed strict return precautions, and outpatient follow-up, patient was discharged in no acute distress.  All questions were answered prior to discharge.    Hydration: Based on the patient's presentation of Acute Vomiting the patient was given IV fluids. IV Hydration was used because oral hydration was not adequate alone. Upon recheck following hydration, the patient was Improved.    DISPOSITION AND DISCUSSIONS      Discussion of management with other QHP or appropriate source(s): None     Escalation of care considered, and ultimately not performed: diagnostic imaging. I considered a CT renal pelvis however given no moderate to severe hydronephrosis on ultrasound, we will defer this study as patient has  received numerous CTs, will avoid additional radiation exposure if possible although if patient is to worsen, she will likely need CT imaging.    Barriers to care at this time, including but not limited to:  None .     Decision tools and prescription drugs considered including, but not limited to: Antibiotics Bactrim .    The patient will return for new or worsening symptoms and is stable at the time of discharge.    DISPOSITION:  Patient will be discharged home in stable condition.    FOLLOW UP:  Danny Harrison M.D.  [address]  [place] [address]  [phone]            OUTPATIENT MEDICATIONS:  New Prescriptions    KETOROLAC (TORADOL) 10 MG TAB    Take 1 Tablet by mouth every four hours as needed for Moderate Pain or Severe Pain.    ONDANSETRON (ZOFRAN ODT) 4 MG TABLET DISPERSIBLE    Take 1 Tablet by mouth every 6 hours as needed for Nausea/Vomiting.    PHENAZOPYRIDINE (PYRIDIUM) 200 MG TAB    Take 1 Tablet by mouth 3 times a day as needed for Mild Pain or Moderate Pain.    SULFAMETHOXAZOLE-TRIMETHOPRIM (BACTRIM DS) 800-160 MG TABLET    Take 1 Tablet by mouth 2 times a day for 7 days.     FINAL DIAGNOSIS  1. Urinary tract infection with hematuria, site unspecified    2. Nausea and vomiting, unspecified vomiting type    3. Nephrolithiasis         Hermelindo ROGERS (Scribe), am scribing for, and in the presence of, Juan Gil M.D..    Electronically signed by: Hermelindo Wu (Scribe), 3/6/2025    Juan ROGERS M.D. personally performed the services described in this documentation, as scribed by Hermelindo Wu in my presence, and it is both accurate and complete.      The note accurately reflects work and decisions made by me.  Juan Gil M.D.  3/6/2025  7:00 AM

## 2025-03-06 NOTE — ED NOTES
Brought by triage nurse from triage waiting room, ambulatory with steady gait, alert/ oriented x 4. Verified patient identification.  Assumed patient care.   Placed on patient room. Changed clothes to hospital gown. Connected to cardiac monitor.   Given the call light and instructed to call for any assistance needed/ or concerns.   Bed on lowest position, side rails up, breaks locked. Awaiting for ERP.

## 2025-03-06 NOTE — DISCHARGE INSTRUCTIONS
Follow up with your urologist in the next several days. Take the antibiotics as prescribed. Return for severe unrelenting pain, weakness, inability to eat/drink. Your urine/blood cultures are pending, if concerning you may be called back to the hospital.

## 2025-03-06 NOTE — ED NOTES
Follow up lab station as the blood sample this RN sent 3:25am and still they haven't process the sample.     Lab said they are going to process it now

## 2025-03-10 ENCOUNTER — TELEPHONE (OUTPATIENT)
Dept: CARDIOLOGY | Facility: MEDICAL CENTER | Age: 20
End: 2025-03-10
Payer: COMMERCIAL

## 2025-03-10 ENCOUNTER — APPOINTMENT (OUTPATIENT)
Dept: RADIOLOGY | Facility: MEDICAL CENTER | Age: 20
End: 2025-03-10
Attending: PHYSICIAN ASSISTANT
Payer: COMMERCIAL

## 2025-03-10 NOTE — TELEPHONE ENCOUNTER
JONES RICHARDSON to JI for review on 3/11/25 as ADD    Preliminary findings:    2 episodes of -169 with an avg rate of 137 bpm    3 episodes of -120 with an avg rate of 109 bpm    Sinus Rhythm  with an avg rate of 72 bpm  Slight P wave morphology changes noted    Isolated Supraventricular ectopics were rare    Ventricular ectopics were rare; no noted couplets    10 patient events:  SR       To Patient- These findings are preliminary and HAVE NOT yet been reviewed by your provider. Please allow our team time to review these findings and someone from our office will contact you if any follow up is necessary.

## 2025-03-11 ENCOUNTER — RESULTS FOLLOW-UP (OUTPATIENT)
Dept: MEDICAL GROUP | Facility: PHYSICIAN GROUP | Age: 20
End: 2025-03-11
Payer: COMMERCIAL

## 2025-03-12 ENCOUNTER — HOSPITAL ENCOUNTER (OUTPATIENT)
Facility: MEDICAL CENTER | Age: 20
End: 2025-03-12
Attending: STUDENT IN AN ORGANIZED HEALTH CARE EDUCATION/TRAINING PROGRAM
Payer: COMMERCIAL

## 2025-03-12 ENCOUNTER — OFFICE VISIT (OUTPATIENT)
Dept: UROLOGY | Facility: MEDICAL CENTER | Age: 20
End: 2025-03-12
Payer: COMMERCIAL

## 2025-03-12 DIAGNOSIS — N30.90 CYSTITIS: ICD-10-CM

## 2025-03-12 DIAGNOSIS — R10.2 PELVIC PAIN: ICD-10-CM

## 2025-03-12 DIAGNOSIS — N20.0 KIDNEY STONE: ICD-10-CM

## 2025-03-12 DIAGNOSIS — N32.81 OVERACTIVE BLADDER: ICD-10-CM

## 2025-03-12 LAB
APPEARANCE UR: CLEAR
BILIRUB UR STRIP-MCNC: NORMAL MG/DL
COLOR UR AUTO: YELLOW
GLUCOSE UR STRIP.AUTO-MCNC: NORMAL MG/DL
KETONES UR STRIP.AUTO-MCNC: NORMAL MG/DL
LEUKOCYTE ESTERASE UR QL STRIP.AUTO: NORMAL
NITRITE UR QL STRIP.AUTO: NORMAL
PH UR STRIP.AUTO: 7 [PH] (ref 5–8)
PROT UR QL STRIP: NORMAL MG/DL
RBC UR QL AUTO: NORMAL
SP GR UR STRIP.AUTO: 1.02
UROBILINOGEN UR STRIP-MCNC: 1 MG/DL

## 2025-03-12 PROCEDURE — 99204 OFFICE O/P NEW MOD 45 MIN: CPT | Performed by: STUDENT IN AN ORGANIZED HEALTH CARE EDUCATION/TRAINING PROGRAM

## 2025-03-12 PROCEDURE — 87086 URINE CULTURE/COLONY COUNT: CPT

## 2025-03-12 PROCEDURE — 87563 M. GENITALIUM AMP PROBE: CPT

## 2025-03-12 PROCEDURE — 81003 URINALYSIS AUTO W/O SCOPE: CPT

## 2025-03-12 PROCEDURE — 87798 DETECT AGENT NOS DNA AMP: CPT | Mod: 91

## 2025-03-12 PROCEDURE — 81002 URINALYSIS NONAUTO W/O SCOPE: CPT | Performed by: STUDENT IN AN ORGANIZED HEALTH CARE EDUCATION/TRAINING PROGRAM

## 2025-03-12 RX ORDER — POTASSIUM CITRATE 15 MEQ/1
30 TABLET, EXTENDED RELEASE ORAL
Qty: 120 TABLET | Refills: 2 | Status: SHIPPED | OUTPATIENT
Start: 2025-03-12 | End: 2025-06-10

## 2025-03-12 NOTE — PROGRESS NOTES
"Subjective  CHIEF COMPLAINT:    Presents to the office today to discuss:    1. Kidney stones  2. Recurrent UTIs  3. Chronic pain  4. OAB with UUI    HPI TODAY: 03/12/2025:    History of cystine stones with multiple prior procedures. Reports recent ER visit last week for passing stones, which is a new symptom. Currently completing course of antibiotics with Bactrim. Reports left-sided flank pain worse than right, with significant bladder pain. Denies current fevers/chills. Reports painful urination, improved with pyridium. No visible hematuria currently.     She was on thiola previously, but required a high dose and her nephrologist recommended discontinuing the medication due to new proteinuria. She is not currently on any medication for stone prevention.    Urinary symptoms include frequency (15x/day), urgency with occasional urge incontinence (improved from previous severe symptoms). Nocturia 0-2x/night. Reports chronic kidney pain described as \"vacuum sealed\" sensation with sharp components.     She has recurrent UTIs and feels like she is on antibiotics every week. She has tried a cranberry supplement in the past as well as daily prophylaxis which did not work and she felt like she got more frequent infections. She has some difficulty with constipation. She drinks 2-3L of water per day.   She has persistent pain/cramping and pressure in the pelvis and bladder. She also has chronic pain over the kidneys that waxes and wanes. She would like to clear out her stone burden to see if that helps with her stones.    Imaging: Ultrasound from 03/05/2025 shows stones in left kidney with mild hydronephrosis. Right kidney with stone burden and mild hydronephrosis. I personally reviewed and independently interpreted the scan.    Prior imaging: CT from January 2025 showed multiple left renal stones, minimal right renal stones status post July surgery.    PAST UROLOGICAL HISTORY:    - History of cystine stones with 15-16 prior " lithotripsies  - Right kidney stone surgery in July 2024  - History of septic shock  - Chronic UTIs  - History of urinary retention    Family History   Problem Relation Age of Onset    No Known Problems Mother     No Known Problems Father     No Known Problems Sister     No Known Problems Sister     No Known Problems Brother     No Known Problems Maternal Grandmother     No Known Problems Maternal Grandfather     No Known Problems Paternal Grandmother     No Known Problems Paternal Grandfather     Cancer Neg Hx     Diabetes Neg Hx     Heart Disease Neg Hx     Hyperlipidemia Neg Hx     Hypertension Neg Hx     Stroke Neg Hx        Social History     Socioeconomic History    Marital status: Single     Spouse name: Not on file    Number of children: Not on file    Years of education: Not on file    Highest education level: Associate degree: occupational, technical, or vocational program   Occupational History    Not on file   Tobacco Use    Smoking status: Never    Smokeless tobacco: Never   Vaping Use    Vaping status: Former    Substances: Nicotine    Devices: Disposable   Substance and Sexual Activity    Alcohol use: Yes     Comment: socially    Drug use: Never    Sexual activity: Yes     Partners: Male     Birth control/protection: Injection   Other Topics Concern    Behavioral problems No    Interpersonal relationships No    Sad or not enjoying activities No    Suicidal thoughts No    Poor school performance No    Reading difficulties No    Speech difficulties No    Writing difficulties No    Inadequate sleep No    Excessive TV viewing No    Excessive video game use No    Inadequate exercise No    Sports related No    Poor diet No    Family concerns for drug/alcohol abuse No    Poor oral hygiene No    Bike safety No    Family concerns vehicle safety No   Social History Narrative    Not on file     Social Drivers of Health     Financial Resource Strain: Low Risk  (7/22/2024)    Overall Financial Resource Strain  (CARDIA)     Difficulty of Paying Living Expenses: Not very hard   Food Insecurity: Food Insecurity Present (7/22/2024)    Hunger Vital Sign     Worried About Running Out of Food in the Last Year: Sometimes true     Ran Out of Food in the Last Year: Never true   Transportation Needs: No Transportation Needs (7/22/2024)    PRAPARE - Transportation     Lack of Transportation (Medical): No     Lack of Transportation (Non-Medical): No   Physical Activity: Sufficiently Active (7/22/2024)    Exercise Vital Sign     Days of Exercise per Week: 5 days     Minutes of Exercise per Session: 60 min   Stress: Stress Concern Present (7/22/2024)    [employer] [place] [employer] - Occupational Stress Questionnaire     Feeling of Stress : Rather much   Social Connections: Socially Isolated (7/22/2024)    Social Connection and Isolation Panel [NHANES]     Frequency of Communication with Friends and Family: More than three times a week     Frequency of Social Gatherings with Friends and Family: More than three times a week     Attends Judaism Services: Never     Active Member of Clubs or Organizations: No     Attends Club or Organization Meetings: Never     Marital Status: Never    Intimate Partner Violence: Not At Risk (7/23/2024)    Humiliation, Afraid, Rape, and Kick questionnaire     Fear of Current or Ex-Partner: No     Emotionally Abused: No     Physically Abused: No     Sexually Abused: No   Housing Stability: High Risk (7/22/2024)    Housing Stability Vital Sign     Unable to Pay for Housing in the Last Year: Yes     Number of Places Lived in the Last Year: 1     Unstable Housing in the Last Year: No       Past Surgical History:   Procedure Laterality Date    ND CYSTOSCOPY,INSERT URETERAL STENT Right 7/22/2024    Procedure: CYSTOSCOPY, WITH URETERAL STENT INSERTION;  Surgeon: Darren Cornejo M.D.;  Location: SURGERY [facility];  Service: Urology    CYSTOSCOPY WITH URETERAL STENT INSERTION OR REMOVAL  Bilateral 8/27/2023    Procedure: CYSTOSCOPY, WITH URETERAL STENT INSERTION OR REMOVAL;  Surgeon: Danny Harrison M.D.;  Location: [facility];  Service: Urology    LASERTRIPSY Bilateral 8/27/2023    Procedure: LITHOTRIPSY, USING LASER;  Surgeon: Danny Harrison M.D.;  Location: [facility];  Service: Urology    UT CYSTOSCOPY,INSERT URETERAL STENT N/A 3/16/2023    Procedure: CYSTOSCOPY;  Surgeon: Nick Ramirez M.D.;  Location: [facility];  Service: Urology    UT CYSTO/URETERO/PYELOSCOPY, DX Left 3/16/2023    Procedure: URETEROSCOPY;  Surgeon: Nick Ramirez M.D.;  Location: [facility];  Service: Urology    UT CYSTOSCOPY,INSERT URETERAL STENT Left 3/7/2022    Procedure: CYSTOSCOPY, WITH URETERAL STENT INSERTION;  Surgeon: Nick Ramirez M.D.;  Location: [facility];  Service: Urology    UT CYSTO/URETERO/PYELOSCOPY, DX Left 3/7/2022    Procedure: URETEROSCOPY;  Surgeon: Nick Ramirez M.D.;  Location: [facility];  Service: Urology    LASERTRIPSY Left 3/7/2022    Procedure: LITHOTRIPSY, USING LASER;  Surgeon: Nick Ramirez M.D.;  Location: [facility];  Service: Urology    UT CYSTOSCOPY,INSERT URETERAL STENT Left 2/24/2022    Procedure: CYSTOSCOPY, WITH URETERAL STENT INSERTION;  Surgeon: Baljeet Yung M.D.;  Location: [facility];  Service: Urology    OTHER ORTHOPEDIC SURGERY  2021    foot - bone shaved    UT CYSTOSCOPY,INSERT URETERAL STENT Left 7/1/2020    Procedure: CYSTOSCOPY, WITH URETERAL STENT INSERTION;  Surgeon: Rafiq Gómez M.D.;  Location: [facility];  Service: Urology    UT CYSTO/URETERO/PYELOSCOPY, DX Left 7/1/2020    Procedure: URETEROSCOPY;  Surgeon: Rafiq Gómez M.D.;  Location: [facility];  Service: Urology    LASERTRIPSY  7/1/2020    Procedure: LITHOTRIPSY, USING LASER;  Surgeon: Rafiq Gómez M.D.;  Location: SURGERY [facility];  Service: Urology    UT  CYSTOSCOPY,INSERT URETERAL STENT Left 8/19/2019    Procedure: CYSTOSCOPY, WITH URETERAL STENT INSERTION;  Surgeon: Rafiq Gómez M.D.;  Location: [facility];  Service: Urology    LASERTRIPSY Left 8/19/2019    Procedure: LITHOTRIPSY, USING LASER;  Surgeon: Rafiq Gómez M.D.;  Location: [facility];  Service: Urology    URETEROSCOPY Left 11/13/2018    Procedure: URETEROSCOPY;  Surgeon: Francisco Sherman M.D.;  Location: [facility];  Service: Urology    STENT PLACEMENT Left 11/13/2018    Procedure: STENT PLACEMENT;  Surgeon: Francisco Sherman M.D.;  Location: [facility];  Service: Urology    CYSTOSCOPY  11/13/2018    Procedure: CYSTOSCOPY;  Surgeon: Francisco Sherman M.D.;  Location: [facility];  Service: Urology    LITHOTRIPSY Left 11/13/2018    Procedure: LITHOTRIPSY;  Surgeon: Francisco Sherman M.D.;  Location: [facility];  Service: Urology    PERCUTANEOUS NEPHROSTOLITHOTOMY Left 11/5/2018    Procedure: PERCUTANEOUS NEPHROSTOLITHOTOMY (PCNL);  Surgeon: Danny Harrison M.D.;  Location: [facility];  Service: Urology    STENT REPLACEMENT Left 11/5/2018    Procedure: STENT REPLACEMENT;  Surgeon: Danny Harrison M.D.;  Location: [facility];  Service: Urology    OTHER  2006    cyst excision to buttock       Past Medical History:   Diagnosis Date    Allergy     latex    Anxiety     Cystinuria (HCC)     Cystinuria (HCC)     Nondisplaced bimalleolar fracture of left lower leg, initial encounter for closed fracture 2007    left lower leg fracture, splinted, no surgery    Psychiatric problem     OCD, anxiety, bipolar    Renal disorder     Kidney stone     Urinary tract infection     pt states she frequent UTIs due to the kidney stones       Current Outpatient Medications   Medication Sig Dispense Refill    sulfamethoxazole-trimethoprim (BACTRIM DS) 800-160 MG tablet Take 1 Tablet by mouth 2 times a day for 7 days. 14  Tablet 0    phenazopyridine (PYRIDIUM) 200 MG Tab Take 1 Tablet by mouth 3 times a day as needed for Mild Pain or Moderate Pain. 6 Tablet 0    ketorolac (TORADOL) 10 MG Tab Take 1 Tablet by mouth every four hours as needed for Moderate Pain or Severe Pain. Do not exceed more than 4 times daily for 5 days 30 Tablet 0    ondansetron (ZOFRAN ODT) 4 MG TABLET DISPERSIBLE Take 1 Tablet by mouth every 6 hours as needed for Nausea/Vomiting. 10 Tablet 0    propranolol (INDERAL) 10 MG Tab TAKE 1-2 TABLETS BY MOUTH 3 TIMES A DAY AS NEEDED (ANXIETY). 540 Tablet 1    venlafaxine XR (EFFEXOR XR) 37.5 MG CAPSULE SR 24 HR Take 1 Capsule by mouth every day. 90 Capsule 1    multivitamin Tab Take 1 Tablet by mouth every day.      albuterol 108 (90 Base) MCG/ACT Aero Soln inhalation aerosol Inhale 2 Puffs every 6 hours as needed for Shortness of Breath. 8.5 g 0    cetirizine (ZYRTEC) 10 MG Tab Take 10 mg by mouth 1 time a day as needed for Allergies.      EPINEPHrine (EPIPEN) 0.3 MG/0.3ML Solution Auto-injector solution for injection INJECT THE CONTENTS OF THE EPIPEN INTO THE THIGH, HOLD FOR 3 SECONDS AND RELEASE FROM THIGH. 1 Each 5    omeprazole (PRILOSEC) 40 MG delayed-release capsule Take 40 mg by mouth 1 time a day as needed (stomach).       No current facility-administered medications for this visit.       Allergies   Allergen Reactions    Latex Anaphylaxis       Objective  There were no vitals taken for this visit.  Physical Exam  Constitutional:       Appearance: Normal appearance.   HENT:      Head: Normocephalic and atraumatic.   Pulmonary:      Effort: Pulmonary effort is normal.   Abdominal:      General: Abdomen is flat. There is no distension.      Palpations: Abdomen is soft.      Tenderness: There is no abdominal tenderness. There is no right CVA tenderness or left CVA tenderness.   Skin:     General: Skin is warm and dry.   Neurological:      General: No focal deficit present.      Mental Status: She is alert.    Psychiatric:         Mood and Affect: Mood normal.         Behavior: Behavior normal.         Labs:   POCT UA   Lab Results   Component Value Date/Time    POCCOLOR yellow 03/12/2025 10:08 AM    POCCOLOR Yellow 10/21/2018 04:51 PM    POCAPPEAR clear 03/12/2025 10:08 AM    POCAPPEAR Cloudy (A) 10/21/2018 04:51 PM    POCLEUKEST neg 03/12/2025 10:08 AM    POCLEUKEST Trace (A) 10/21/2018 04:51 PM    POCNITRITE neg 03/12/2025 10:08 AM    POCNITRITE Negative 10/21/2018 04:51 PM    POCUROBILIGE 1.0 03/12/2025 10:08 AM    POCPROTEIN neg 03/12/2025 10:08 AM    POCPROTEIN 30 (A) 10/21/2018 04:51 PM    POCURPH 7.0 03/12/2025 10:08 AM    POCURPH 8.5 (A) 10/21/2018 04:51 PM    POCBLOOD trace 03/12/2025 10:08 AM    POCBLOOD Moderate (A) 10/21/2018 04:51 PM    POCSPGRV 1.020 03/12/2025 10:08 AM    POCSPGRV 1.015 10/21/2018 04:51 PM    POCKETONES neg 03/12/2025 10:08 AM    POCKETONES Negative 10/21/2018 04:51 PM    POCBILIRUBIN neg 03/12/2025 10:08 AM    POCGLUCUA neg 03/12/2025 10:08 AM    POCGLUCUA Negative 10/21/2018 04:51 PM      BMP   Lab Results   Component Value Date/Time    SODIUM 139 03/06/2025 0325    POTASSIUM 4.3 03/06/2025 0325    CHLORIDE 104 03/06/2025 0325    CO2 22 03/06/2025 0325    GLUCOSE 100 (H) 03/06/2025 0325    BUN 18 03/06/2025 0325    CREATININE 0.99 03/06/2025 0325    CALCIUM 9.4 03/06/2025 0325           Imaging:   CT RENAL COLIC   CT-RENAL COLIC EVALUATION(A/P W/O) 01/07/2025    Narrative  1/7/2025 2:37 PM    HISTORY/REASON FOR EXAM:  ABDOMINAL PAIN, NAUSEA/VOMITING    TECHNIQUE/EXAM DESCRIPTION AND NUMBER OF VIEWS:  CT scan renal/colic without contrast.    5 mm helical images of the abdomen and pelvis were obtained from the diaphragmatic domes through the pubic symphysis.    Low dose optimization technique was utilized for this CT exam including automated exposure control and adjustment of the mA and/or kV according to patient size.    COMPARISON: 7/22/2024    FINDINGS:  Renal stone: Punctate  nonobstructing RIGHT kidney stone.  Multiple nonobstructing LEFT kidney stones measuring up to 3 x 6 mm (373 HU).  No calcified gallstone.    Lung Bases:  No pulmonary nodules at the lung bases. No pleural or pericardial fluid.    Abdomen:    Within the limits of noncontrast technique:    Liver: Unremarkable.    Spleen: Unremarkable.    Pancreas: Unremarkable.    Gallbladder: No calcified stones.    Biliary: No biliary dilatation..    Adrenal glands: Nonenlarged.    Bowel: Eval obstruction or gross wall thickening.  Normal appendix.    Lymph nodes: No adenopathy.    Vasculature: The abdominal aorta is normal in caliber    Peritoneum: No pneumoperitoneum.    Musculoskeletal: No aggressive osseous lesion.    Pelvis:  Bladder is unremarkable.  IUD in place.  Ovaries are grossly unremarkable.  Minimal dependent fluid.  Belly button piercing noted.    Impression  1.  Bilateral nonobstructing kidney stones, larger and more numerous on the LEFT.  2.  No ureteral stone or hydronephrosis.  3.  No focal mesenteric inflammatory process.     US RENAL   US-RENAL 03/06/2025    Narrative  3/6/2025 4:49 AM    HISTORY/REASON FOR EXAM:  Pain    TECHNIQUE/EXAM DESCRIPTION:  Renal ultrasound.    COMPARISON:  None    FINDINGS:    The right kidney measures 10.42 cm.  The left kidney measures 11.20 cm.    There is 5.2 mm dilatation the right lower pelvis at 5.6 mm dilatation of the renal pelvis. Echogenic foci in the bilateral kidneys is seen.    The bladder demonstrates no focal wall abnormality.    Impression  1.  Bilateral nephrolithiasis.  2.  Mild dilatation bilateral renal pelvis, could represent extrarenal pelvis morphology versus mild hydronephrosis.      Assessment    We reviewed the treatment options for overactive bladder including bladder training, avoidance of bladder irritants (such as caffeinated beverages, spicy food, citrus, tomatoes, etc), reduction of fluid intake 2 hours before bed, constipation management ensuring one  soft bowel movement per day, pelvic floor physical therapy, medical therapy (Trospium, Detrol, Myrbetriq, etc), cystoscopy with bladder botox injections, and Interstim placement.     We reviewed the lifestyle modifications recommended for recurrent urinary tract infections including management of constipation to ensure one soft bowel movement per day and adequate hydration with water. We discussed the treatment options for recurrent urinary tract infections including a daily cranberry supplement, daily suppressive antibiotic therapy, post-coital antibiotic therapy, and constipation management.  If she develops signs of symptoms of a UTI, a standing order will be placed so she can drop off a sample and empiric antibiotic therapy can be started based off of prior cultures.    For pelvic pain we discussed that pelvic floor physical therapy, pelvic floor trigger point injections, bladder botox injections, and sacral neuromodulation can all improve symptoms. With bladder botox injections she would be at an increased risk for retention given her history so alternative agents would be better options.    She has had difficulty in the past getting antibiotics at urgent care due to her urologic history. If she presents to UC/ER she can be started on empiric therapy based off of prior cultures while the urine culture is pending, or call the on call number for after hours care.    1. Cystine Nephrolithiasis (N20.0)  - Assessment: Multiple left renal stones and right renal stones with recent stone passage and pain  - Plan:  * CT scan to evaluate current stone burden  * Plan for left-sided ureteroscopy with Calyxo device followed by right ureteroscopy and lithotripsy  * Consider pre-stenting for optimal access  * Discussed staged approach vs bilateral procedure  * start potassium citrate  * Compounded vaginal suppository for stent discomfort    2. Recurrent UTIs (N39.0)  - Assessment: Ongoing symptoms despite recent  antibiotics  - Plan:  * Urinalysis, culture, and PCR for atypical organisms today  * Standing lab orders for future UTI symptoms  * Cranberry supplement recommended (Ellura brand)  * Address constipation as contributing factor  *recommend 2-3L of water per day    3. Overactive Bladder/Pelvic Floor Dysfunction (N32.81)  - Plan:  * Pelvic floor physical therapy referral placed  * TENS therapy instructions provided  * Discussed additional options including Botox, sacral neuromodulation if conservative measures fail      Plan    Problem List Items Addressed This Visit    None  Visit Diagnoses         Pelvic pain        Relevant Orders    Referral to Physical Therapy      Overactive bladder        Relevant Orders    Referral to Physical Therapy      Cystitis        Relevant Orders    URINALYSIS    URINE CULTURE(NEW)    UROGENITAL UREAPLASMA/MYCOPLASMA, PCR    POCT Urinalysis      Kidney stone        Relevant Orders    CT-RENAL COLIC EVALUATION(A/P W/O)          ORDERS:  - CT abdomen/pelvis renal colic  - Urinalysis, culture, PCR  - Standing lab orders for future UTIs  - Pelvic floor PT referral  - potassium citrate 30 mEq BID      FOLLOW UP:  3 months, sooner if needed. Will coordinate surgical planning based on CT results.    SHORT SUMMARY:  Complex cystine stone former presenting with active symptoms, planning surgical intervention with new Calyxo device after CT evaluation to clear stone burden. Multiple management strategies initiated for associated conditions.

## 2025-03-12 NOTE — PATIENT INSTRUCTIONS
Bowel Cleanout for Severe Constipation    Severe constipation can be uncomfortable and distressing, but with a comprehensive approach, it can be managed effectively. Below is a step-by-step guide on how to use dietary changes, lifestyle modifications, and over-the-counter treatments to help relieve constipation.    Step 1: Increase Fluid Intake   - Water: Drink plenty of water throughout the day (at least 8-10 cups). Proper hydration helps soften stool and promotes bowel movement.  - Prune Juice: Drink 4-8 ounces of prune juice daily. Prune juice contains sorbitol, a natural laxative that can soften stool and help relieve constipation.  - Herbal Teas: Certain herbal teas like senna or peppermint may help stimulate bowel movement. Consider drinking these after meals.    Step 2: Increase Dietary Fiber    Incorporate high-fiber foods to promote regular bowel movements:  - Prunes (Dried Plums): Eat 6-10 prunes daily. Prunes are rich in fiber and sorbitol, both of which are effective in relieving constipation.  - Fiber-rich Foods: Add whole grains, vegetables, beans, and fruits (like apples, pears, and berries) to your diet to help keep your digestive system moving    Step 3: Use Over-the-Counter Laxatives and Medications   For immediate relief of severe constipation, consider using one or more of the following treatments:    1. Miralax (Polyethylene Glycol)   - Dosage: Mix 17 grams (one capful) in 4-8 ounces of water and drink once a day. You may take it for 1-3 days until you have a bowel movement.  - How it works: Miralax draws water into the bowel, softening the stool and making it easier to pass.    2. Dulcolax (Bisacodyl)    - Dosage: Take 1-2 oral tablet (5-10 mg) at bedtime for overnight relief. You can also use Dulcolax suppositories if oral tablets do not provide relief.  - How it works: Dulcolax stimulates the muscles of the colon, encouraging bowel movements. It works within 6-12 hours when taken orally, or  15-30 minutes when used as a suppository.    3. Senna (Sennosides)    - Dosage: Take 1-2 tablets (8.6 mg each) at bedtime for gentle, overnight relief.  - How it works: Senna is a natural stimulant laxative that stimulates muscle contractions in the colon, promoting bowel movement. It typically works within 6-12 hours.    4. Docusate (Colace)    - Dosage: Take 100 mg of Docusate once or twice daily.  - How it works: Docusate is a stool softener that helps draw water into the stool, making it easier to pass. It works more gradually (usually in 1-3 days), but it can be helpful for softer stool.    5. Suppositories (Glycerin or Bisacodyl)    - Glycerin Suppositories: Insert 1 glycerin suppository into the rectum for prompt relief (usually within 30 minutes to an hour).  - Bisacodyl Suppositories: Insert **1 Bisacodyl suppository** into the rectum for quick relief (typically within 15-30 minutes).  - How they work: Suppositories directly stimulate the rectum or draw water into the stool, leading to a bowel movement.    Step 4: Lifestyle and Dietary Tips   - Stay Active: Regular physical activity, such as walking or gentle exercise, helps stimulate the bowel and encourages regular bowel movements.  -Establish a Routine: Try to use the toilet at the same time each day, especially after meals, to establish a routine and encourage bowel movements.  -Respond to the Urge: Go to the bathroom when you first feel the urge. Delaying can make constipation worse.    Step 5: Other Considerations    - Limit Use of Laxatives: While laxatives can provide immediate relief, avoid long-term or overuse as they can lead to dependence or electrolyte imbalances.  - Monitor Your Progress: If you're not seeing results after several days of treatment, or if you experience severe discomfort, please contact your healthcare provider for further guidance.    When to Seek Medical Help   Seek medical attention if you experience:  - Persistent  constipation despite these measures  - Severe abdominal pain or cramping  - Vomiting or inability to pass gas or stool  - Blood in the stool or black, tarry stools  - Unexplained weight loss or fatigue  - If you have chronic conditions such as heart disease or kidney problems, consult your physician before using certain medications (e.g., stimulant laxatives).    Additional Notes    -Miralax  and  Docusate  are generally considered safe for short-term use, but if symptoms persist for more than a week, or if you need long-term management, discuss options with your healthcare provider.  - Prunes and prune juice are excellent, natural remedies and can be safely consumed daily. They may take a bit longer to work compared to medications.  - Use suppositories for immediate relief if oral medications are not effective, but be mindful of their use for only short-term relief.    Transcutaneous Tibial Nerve Stimulation     What is Transcutaneous Tibial Nerve Stimulation (TTNS)?     Transcutaneous Tibial Nerve Stimulation (TTNS) is a non-invasive therapy used to treat various medical conditions, including overactive bladder, urinary urgency, and some types of chronic pain. It involves the use of a TENS (Transcutaneous Electrical Nerve Stimulation) machine to stimulate the tibial nerve, which can help alleviate symptoms and improve muscle function.     Before You Begin:     Before starting TTNS therapy with a TENS machine, please ensure the following:     Consultation: You should have discussed this treatment with your healthcare provider, who will determine if TTNS is suitable for your condition.   TENS Machine: Ensure you have a TENS machine specifically designed for TTNS. Your healthcare provider or a medical supply store can help you acquire the appropriate device. We recommend the TENS 7000 Digital TENS Unit with accessories (can be ordered from [employer]) or a similar device.   Clean Hands: Always wash your hands thoroughly  before handling the TENS machine or its electrodes.   Using Your TENS Machine for TTNS:     Follow these steps to use your TENS machine for TTNS:     Prepare the TENS Machine:     a. Make sure the TENS machine is turned off before attaching electrodes.   b. Ensure the device is clean and in good working condition.     Electrode Placement:     a. Your healthcare provider will guide you on the precise placement of the electrodes. Typically, the first lead is placed between the medial malleolus (ankle bone on the inside of your leg) and your heel, and the second pad is placed 2 pad widths up behind your tibia (place directly above first pad). See diagram below.   b. Ensure your skin is clean and dry before attaching the electrodes.   c. Connect the electrodes to the TENS machine following the 's instructions.         Turn On the TENS Machine:     a. Start with the TENS machine turned off.   b. Gradually increase the intensity to a comfortable level, as directed by your healthcare provider.  Pulse width setting of 200 ls, intensity of 22-28 mA, and a frequency of 10 Hz. You should feel a gentle, tingling sensation but no discomfort or pain.     Treatment Duration:     a. The duration and frequency of TTNS recommended is 30 minutes, once a week for 12 weeks. You can then transition to a maintenance schedule of once a month for 30 minutes. You can perform this more often if needed (as frequently as daily).     During Treatment:     a. Remain still and relaxed during the TTNS session.   b. You may read, watch TV, or engage in a quiet activity while using the TENS machine.     After Treatment:     a. Turn off the TENS machine when the session is complete.   b. Carefully remove the electrodes and clean the skin if needed.   c. Store the TENS machine and electrodes in a safe, dry place.     Important Considerations:     Safety: Do not use your TENS machine while bathing or showering.   Skin Sensitivity: If you  experience skin irritation or discomfort, discontinue use and contact your healthcare provider.   Medication: Continue any medications as prescribed by your healthcare provider unless instructed otherwise.   Maintenance: Regularly check your TENS machine and electrodes for wear and tear. Replace them as needed.   Follow-Up: Keep all scheduled follow-up appointments with your healthcare provider to assess your progress and make any necessary adjustments to your treatment plan.     If you have any questions or concerns about your TTNS therapy or experience any unusual symptoms, contact your healthcare provider promptly.

## 2025-03-13 LAB
APPEARANCE UR: CLEAR
BILIRUB UR QL STRIP.AUTO: NEGATIVE
COLOR UR: YELLOW
GLUCOSE UR STRIP.AUTO-MCNC: NEGATIVE MG/DL
KETONES UR STRIP.AUTO-MCNC: NEGATIVE MG/DL
LEUKOCYTE ESTERASE UR QL STRIP.AUTO: NEGATIVE
MICRO URNS: NORMAL
NITRITE UR QL STRIP.AUTO: NEGATIVE
PH UR STRIP.AUTO: 7 [PH] (ref 5–8)
PROT UR QL STRIP: NEGATIVE MG/DL
RBC UR QL AUTO: NEGATIVE
SP GR UR STRIP.AUTO: 1.02
UROBILINOGEN UR STRIP.AUTO-MCNC: 1 EU/DL

## 2025-03-14 ENCOUNTER — RESULTS FOLLOW-UP (OUTPATIENT)
Dept: UROLOGY | Facility: MEDICAL CENTER | Age: 20
End: 2025-03-14
Payer: COMMERCIAL

## 2025-03-15 LAB
BACTERIA UR CULT: NORMAL
SIGNIFICANT IND 70042: NORMAL
SITE SITE: NORMAL
SOURCE SOURCE: NORMAL

## 2025-03-16 ENCOUNTER — RESULTS FOLLOW-UP (OUTPATIENT)
Dept: UROLOGY | Facility: MEDICAL CENTER | Age: 20
End: 2025-03-16
Payer: COMMERCIAL

## 2025-03-16 LAB
M GENITALIUM DNA SPEC QL NAA+PROBE: NOT DETECTED
M HOMINIS DNA SPEC QL NAA+PROBE: NOT DETECTED
SPECIMEN SOURCE: ABNORMAL
U PARVUM DNA SPEC QL NAA+PROBE: DETECTED
U UREALYTICUM DNA SPEC QL NAA+PROBE: NOT DETECTED

## 2025-03-16 RX ORDER — DOXYCYCLINE 100 MG/1
100 CAPSULE ORAL 2 TIMES DAILY
Qty: 14 EACH | Refills: 0 | Status: SHIPPED | OUTPATIENT
Start: 2025-03-16 | End: 2025-03-23

## 2025-03-19 ENCOUNTER — HOSPITAL ENCOUNTER (OUTPATIENT)
Dept: RADIOLOGY | Facility: MEDICAL CENTER | Age: 20
End: 2025-03-19
Attending: STUDENT IN AN ORGANIZED HEALTH CARE EDUCATION/TRAINING PROGRAM
Payer: COMMERCIAL

## 2025-03-19 DIAGNOSIS — N20.0 KIDNEY STONE: ICD-10-CM

## 2025-03-19 PROCEDURE — 74176 CT ABD & PELVIS W/O CONTRAST: CPT

## 2025-03-22 ENCOUNTER — APPOINTMENT (OUTPATIENT)
Dept: URGENT CARE | Facility: PHYSICIAN GROUP | Age: 20
End: 2025-03-22
Payer: COMMERCIAL

## 2025-03-22 ENCOUNTER — RESULTS FOLLOW-UP (OUTPATIENT)
Dept: URGENT CARE | Facility: PHYSICIAN GROUP | Age: 20
End: 2025-03-22

## 2025-03-22 ENCOUNTER — OFFICE VISIT (OUTPATIENT)
Dept: URGENT CARE | Facility: PHYSICIAN GROUP | Age: 20
End: 2025-03-22
Payer: COMMERCIAL

## 2025-03-22 VITALS
OXYGEN SATURATION: 96 % | WEIGHT: 113.8 LBS | DIASTOLIC BLOOD PRESSURE: 70 MMHG | SYSTOLIC BLOOD PRESSURE: 102 MMHG | TEMPERATURE: 98.4 F | HEIGHT: 63 IN | RESPIRATION RATE: 18 BRPM | HEART RATE: 65 BPM | BODY MASS INDEX: 20.16 KG/M2

## 2025-03-22 DIAGNOSIS — J04.0 LARYNGITIS: ICD-10-CM

## 2025-03-22 DIAGNOSIS — J02.9 PHARYNGITIS, UNSPECIFIED ETIOLOGY: ICD-10-CM

## 2025-03-22 LAB — S PYO DNA SPEC NAA+PROBE: NOT DETECTED

## 2025-03-22 PROCEDURE — 87651 STREP A DNA AMP PROBE: CPT

## 2025-03-22 PROCEDURE — 3074F SYST BP LT 130 MM HG: CPT

## 2025-03-22 PROCEDURE — 3078F DIAST BP <80 MM HG: CPT

## 2025-03-22 PROCEDURE — 99213 OFFICE O/P EST LOW 20 MIN: CPT

## 2025-03-22 RX ORDER — METHYLPREDNISOLONE 4 MG/1
TABLET ORAL
Qty: 21 TABLET | Refills: 0 | Status: SHIPPED | OUTPATIENT
Start: 2025-03-22

## 2025-03-22 ASSESSMENT — FIBROSIS 4 INDEX: FIB4 SCORE: 0.58

## 2025-03-23 NOTE — RESULT ENCOUNTER NOTE
Sent patient MyChart message regarding negative strep test and to continue the plan of care that was discussed in clinic.

## 2025-03-23 NOTE — PROGRESS NOTES
Subjective:   Ivory Chávez is a 20 y.o. female who presents for Pharyngitis (X 5 days.)    Patient presents to the clinic for complaints of sore throat x 5 days.  Patient having a cough, but has been mild and only secondary to the sore throat.  Patient also lost voice this morning.  Positive For known sick exposure to kids at work.  Has taken no meds for her symptoms.  Patient denies chest pain, SOB, dizziness/lightness/vertigo, nausea/vomiting/diarrhea, difficulty breathing or swallowing, palpitations or racing heart rate, fever, or chills.    Pharyngitis         ROS  Refer to [facility] for additional details.    During this visit, appropriate PPE was worn, and hand hygiene was performed.    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    She has no past medical history of Addisons disease ([facility]), Adrenal disorder ([facility]), Anemia, Arrhythmia, Asthma, Cancer ([facility]), CHF (congestive heart failure) ([facility]), Chicken pox, Clotting disorder ([facility]), COLD (chronic obstructive lung disease) ([facility]), Cushings syndrome ([facility]), Depression, Diabetes ([facility]), Diabetic neuropathy ([facility]), Ear infection, EEG abnormal, Failure to thrive (0-17), GERD (gastroesophageal reflux disease), Goiter, Head ache, Heart attack ([facility]), Heart murmur, High birth weight infant, HIV (human immunodeficiency virus infection) ([facility]), IBD (inflammatory bowel disease), Irregular heart beat, Low birth weight, Measles, Meningitis, Migraine, Mumps, Muscle disorder, Osteoporosis, Parathyroid disorder ([facility]), Pesticide exposure, Pituitary disease ([facility]), Rheumatic fever, Rubella, Scarlet fever, Seizure ([facility]), Sickle cell disease ([facility]), Solvent exposure, Speech abnormality, Strep throat, Substance abuse ([facility]), Thyroid disease, Tonsillitis, or Tuberculosis.    MEDS:    Current Outpatient Medications:     methylPREDNISolone (MEDROL DOSEPAK) 4 MG Tablet Therapy Pack, Follow schedule on package instructions., Disp: 21 Tablet, Rfl: 0    doxycycline (VIBRAMYCIN) 100 MG Cap, Take 1 Capsule by mouth 2 times a day for 7 days., Disp: 14 Each, Rfl: 0    Potassium Citrate 15 MEQ (1620 MG) Tab CR, Take 30 mEq by mouth 2 times a day for 90 days., Disp: 120 Tablet, Rfl: 2    phenazopyridine (PYRIDIUM) 200 MG Tab, Take 1 Tablet by mouth 3 times a day as needed for Mild Pain or Moderate Pain., Disp: 6 Tablet, Rfl: 0    ketorolac (TORADOL) 10 MG Tab, Take 1 Tablet by mouth every four hours as needed for Moderate Pain or Severe Pain. Do not exceed more than 4 times daily for 5 days, Disp: 30 Tablet, Rfl: 0    ondansetron (ZOFRAN ODT) 4 MG TABLET DISPERSIBLE, Take 1 Tablet by mouth every 6 hours as needed for Nausea/Vomiting., Disp: 10 Tablet, Rfl: 0    propranolol (INDERAL) 10 MG Tab, TAKE 1-2 TABLETS BY MOUTH 3 TIMES A DAY AS NEEDED (ANXIETY)., Disp: 540 Tablet, Rfl: 1    venlafaxine XR (EFFEXOR XR) 37.5 MG CAPSULE SR 24 HR, Take 1 Capsule by mouth every day., Disp: 90 Capsule, Rfl: 1    albuterol 108 (90 Base) MCG/ACT Aero Soln inhalation aerosol, Inhale 2 Puffs every 6 hours as needed for Shortness of Breath., Disp: 8.5 g, Rfl: 0    cetirizine (ZYRTEC) 10 MG Tab, Take 10 mg by mouth 1 time a day as needed for Allergies., Disp: , Rfl:     EPINEPHrine (EPIPEN) 0.3 MG/0.3ML Solution Auto-injector solution for injection, INJECT THE CONTENTS OF THE EPIPEN INTO THE THIGH, HOLD FOR 3 SECONDS AND RELEASE FROM THIGH., Disp: 1 Each, Rfl: 5    NON SPECIFIED, Diazepam 10mg, Baclofen 10mg, Lidocaine 25mg, Disp: 20 Each, Rfl: 3    multivitamin Tab, Take 1 Tablet by mouth every day. (Patient not taking: Reported on 3/22/2025), Disp: , Rfl:     omeprazole (PRILOSEC) 40 MG delayed-release capsule, Take 40 mg by mouth 1 time a day as needed (stomach)., Disp: , Rfl:     ALLERGIES:   Allergies   Allergen  Reactions    Latex Anaphylaxis     SURGHX:   Past Surgical History:   Procedure Laterality Date    [place] CYSTOSCOPY,INSERT URETERAL STENT Right 7/22/2024    Procedure: CYSTOSCOPY, WITH URETERAL STENT INSERTION;  Surgeon: Darren Cornejo M.D.;  Location: [facility];  Service: Urology    CYSTOSCOPY WITH URETERAL STENT INSERTION OR REMOVAL Bilateral 8/27/2023    Procedure: CYSTOSCOPY, WITH URETERAL STENT INSERTION OR REMOVAL;  Surgeon: Danny Harrison M.D.;  Location: [facility];  Service: Urology    LASERTRIPSY Bilateral 8/27/2023    Procedure: LITHOTRIPSY, USING LASER;  Surgeon: Danny Harrison M.D.;  Location: [facility];  Service: Urology    [place] CYSTOSCOPY,INSERT URETERAL STENT N/A 3/16/2023    Procedure: CYSTOSCOPY;  Surgeon: Nick Ramirez M.D.;  Location: [facility];  Service: Urology    [place] CYSTO/URETERO/PYELOSCOPY, DX Left 3/16/2023    Procedure: URETEROSCOPY;  Surgeon: Nick Ramirez M.D.;  Location: [facility];  Service: Urology    [place] CYSTOSCOPY,INSERT URETERAL STENT Left 3/7/2022    Procedure: CYSTOSCOPY, WITH URETERAL STENT INSERTION;  Surgeon: Nick Ramirez M.D.;  Location: [facility];  Service: Urology    [place] CYSTO/URETERO/PYELOSCOPY, DX Left 3/7/2022    Procedure: URETEROSCOPY;  Surgeon: Nick Ramirez M.D.;  Location: [facility];  Service: Urology    LASERTRIPSY Left 3/7/2022    Procedure: LITHOTRIPSY, USING LASER;  Surgeon: Nick Ramirez M.D.;  Location: [facility];  Service: Urology    [place] CYSTOSCOPY,INSERT URETERAL STENT Left 2/24/2022    Procedure: CYSTOSCOPY, WITH URETERAL STENT INSERTION;  Surgeon: Baljeet Yung M.D.;  Location: [facility];  Service: Urology    OTHER ORTHOPEDIC SURGERY  2021    foot - bone shaved    [place] CYSTOSCOPY,INSERT URETERAL STENT Left 7/1/2020    Procedure: CYSTOSCOPY, WITH URETERAL STENT INSERTION;  Surgeon: Rafiq Gómez M.D.;  Location: SURGERY [facility];  Service:  Urology    [place] CYSTO/URETERO/PYELOSCOPY, DX Left 7/1/2020    Procedure: URETEROSCOPY;  Surgeon: Rafiq Gómez M.D.;  Location: SURGERY [facility];  Service: Urology    LASERTRIPSY  7/1/2020    Procedure: LITHOTRIPSY, USING LASER;  Surgeon: Rafiq Gómez M.D.;  Location: SURGERY [facility];  Service: Urology    [place] CYSTOSCOPY,INSERT URETERAL STENT Left 8/19/2019    Procedure: CYSTOSCOPY, WITH URETERAL STENT INSERTION;  Surgeon: Rafiq Gómez M.D.;  Location: SURGERY [facility];  Service: Urology    LASERTRIPSY Left 8/19/2019    Procedure: LITHOTRIPSY, USING LASER;  Surgeon: Rafiq Gómez M.D.;  Location: SURGERY [facility];  Service: Urology    URETEROSCOPY Left 11/13/2018    Procedure: URETEROSCOPY;  Surgeon: Francisco Sherman M.D.;  Location: SURGERY [facility];  Service: Urology    STENT PLACEMENT Left 11/13/2018    Procedure: STENT PLACEMENT;  Surgeon: Francisco Sherman M.D.;  Location: SURGERY [facility];  Service: Urology    CYSTOSCOPY  11/13/2018    Procedure: CYSTOSCOPY;  Surgeon: Francisco Sherman M.D.;  Location: SURGERY [facility];  Service: Urology    LITHOTRIPSY Left 11/13/2018    Procedure: LITHOTRIPSY;  Surgeon: Francisco Sherman M.D.;  Location: SURGERY [facility];  Service: Urology    PERCUTANEOUS NEPHROSTOLITHOTOMY Left 11/5/2018    Procedure: PERCUTANEOUS NEPHROSTOLITHOTOMY (PCNL);  Surgeon: Danny Harrison M.D.;  Location: SURGERY [facility];  Service: Urology    STENT REPLACEMENT Left 11/5/2018    Procedure: STENT REPLACEMENT;  Surgeon: Danny Harrison M.D.;  Location: SURGERY [facility];  Service: Urology    OTHER  2006    cyst excision to buttock     SOCHX:  reports that she has never smoked. She has never used smokeless tobacco. She reports current alcohol use. She reports that she does not use drugs.    FH: Per HPI as applicable/pertinent.    Medications, Allergies, and current problem list reviewed today in Epic.      "Objective:     /70 (BP Location: Left arm, Patient Position: Sitting, BP Cuff Size: Adult long)   Pulse 65   Temp 36.9 °C (98.4 °F) (Temporal)   Resp 18   Ht 1.6 m (5' 3\")   Wt 51.6 kg (113 lb 12.8 oz)   SpO2 96%     Physical Exam  Constitutional:       Appearance: Normal appearance. She is ill-appearing. She is not toxic-appearing.   HENT:      Head: Normocephalic.      Right Ear: Tympanic membrane, ear canal and external ear normal.      Left Ear: Tympanic membrane, ear canal and external ear normal.      Nose: Nose normal. No congestion or rhinorrhea.      Mouth/Throat:      Mouth: Mucous membranes are moist.      Pharynx: Oropharynx is clear. Posterior oropharyngeal erythema present. No oropharyngeal exudate.      Comments: Hoarse voice throughout visit.  Eyes:      General:         Right eye: No discharge.         Left eye: No discharge.      Extraocular Movements: Extraocular movements intact.      Conjunctiva/sclera: Conjunctivae normal.      Pupils: Pupils are equal, round, and reactive to light.   Cardiovascular:      Rate and Rhythm: Normal rate and regular rhythm.      Pulses: Normal pulses.      Heart sounds: Normal heart sounds. No murmur heard.  Pulmonary:      Effort: Pulmonary effort is normal. No respiratory distress.      Breath sounds: Normal breath sounds. No wheezing or rhonchi.   Abdominal:      General: Abdomen is flat.   Musculoskeletal:         General: No signs of injury. Normal range of motion.      Cervical back: Normal range of motion. No rigidity.   Lymphadenopathy:      Cervical: No cervical adenopathy.   Skin:     General: Skin is warm and dry.   Neurological:      General: No focal deficit present.      Mental Status: She is alert and oriented to person, place, and time.      Motor: No weakness.         Assessment/Plan:     Diagnosis and associated orders:     1. Laryngitis  - methylPREDNISolone (MEDROL DOSEPAK) 4 MG Tablet Therapy Pack; Follow schedule on package " instructions.  Dispense: 21 Tablet; Refill: 0    2. Pharyngitis, unspecified etiology  - methylPREDNISolone (MEDROL DOSEPAK) 4 MG Tablet Therapy Pack; Follow schedule on package instructions.  Dispense: 21 Tablet; Refill: 0  - POCT CEPHEID GROUP A STREP - PCR     Comments/MDM:     Discussed that likely etiology of the patient's symptoms is viral versus bacterial pharyngitis.  POC strep swab collected in clinic. Discussed that they will receive a phone call or [employer] message from this provider regarding the results of the tests along with any changes with medication or treatment plan as appropriate.  Medrol Dosepak prescribed for the patient. Discussed proper administration and dosing as well as associated adverse/side effects of prescribed medications.  Advised patient to continue OTC pharmacologic and nonpharmacologic treatment of her symptoms, including but not limited to Tylenol, Motrin, OTC cough and cold medications, vocal rest, warm salt water gargles, and plenty of rest and fluids.  Patient agreeable to this plan of care.  All questions answered.  Return to clinic if symptoms persist or worsen.  Red flag symptoms warranting emergency medical services discussed, including but not limited to chest pain, SOB, wheezing, difficulty breathing or swallowing, sensation of throat closing or mass in the throat, severe intractable headache, changes to vision or hearing, palpitations or racing heart rate, abdominal pain, fever greater than 103F despite medication management, dizziness/lightheadedness/vertigo, or nausea/vomiting/diarrhea.           Differential diagnosis, natural history, supportive care, and indications for immediate follow-up discussed.    Advised the patient to follow-up with the primary care physician for recheck, reevaluation, and consideration of further management.    Instructed patient to seek emergency medical attention via calling EMS or going to the Emergency Room for red flag symptoms,  including but not limited to: chest pain, palpitations, fever greater than 103F, shortness of breath, wheezing, new or worsened numbness/tingling, focal or unilateral weakness, and bloody vomit/stool.     Please note that this dictation was created using voice recognition software. I have made a reasonable attempt to correct obvious errors, but I expect that there are errors of grammar and possibly content that I did not discover before finalizing the note.    This note was electronically signed by MARTHA Victor

## 2025-03-27 ENCOUNTER — RESULTS FOLLOW-UP (OUTPATIENT)
Dept: UROLOGY | Facility: MEDICAL CENTER | Age: 20
End: 2025-03-27

## 2025-03-31 NOTE — Clinical Note
Member Name: Ivory Chávez   Member Number: [id number]   Reference Number: [id number]   Approved Services: Outpatient Surgery   Approved Service Dates: 04/25/2025 - 04/25/2025   Requesting Provider: Antwan Dwyer   Requested Provider: [facility]     Dear Ivory Chávez:     The following medical service(s) requested by Antwan Dwyer have been approved:    Procedure Code Procedure Code Name Requested Quantity Approved Quantity Status   48140 (CPT®) [place] CYSTOSCOPY,INSERT URETERAL STENT 1 1 Authorized       Approved Quantity means the number of visits approved for medication treatments and/or medical services.    The services should be provided by [facility] no later than 04/25/2025. Please contact the provider listed below with any questions.     Provider Information:  [facility]  [phone]    Your plan benefit may require a deductible, co-payment or coinsurance for these services. This authorization does not guarantee [facility] will pay the claim for services that you receive. Payment by [facility] for these services is subject to the terms of your Evidence of Coverage or Summary Plan Description, your eligibility at the time of service, and confirmation of benefit coverage.    For any questions or additional information, please contact Customer Service:    [facility]  Customer Service: [phone] or toll free [phone]  TTY users dial: [phone]   Call Center Hours: Mon - Fri 7 AM to 8 PM PST   Office Hours: Mon - Fri 8 AM to 5 PM [facility]   E-mail: [email or web address]   Website: [email or web address]       This information is available for free in other languages. Please contact Customer Service at the phone number above for more information. [facility] complies with applicable Federal civil rights laws and does not discriminate on the basis of race, color, national origin, age, disability  or sex.      Sincerely,     Healthcare Utilization Management Department     Cc: [facility]   Antwan Dwyer

## 2025-03-31 NOTE — Clinical Note
Member Name: Ivory Chávez   Member Number: [id number]   Reference Number: [id number]   Approved Services: Outpatient Surgery   Approved Service Dates: 05/09/2025 - 05/09/2025   Requesting Provider: Antwan Dwyer   Requested Provider: [facility]     Dear Ivory Chávez:     The following medical service(s) requested by Antwan Dwyer have been approved:    Procedure Code Procedure Code Name Requested Quantity Approved Quantity Status   74305 (CPT®) [place] CYSTOSCOPY,INSERT URETERAL STENT 1 1 Authorized       Approved Quantity means the number of visits approved for medication treatments and/or medical services.    The services should be provided by [facility] no later than 05/09/2025. Please contact the provider listed below with any questions.     Provider Information:  [facility]  [phone]    Your plan benefit may require a deductible, co-payment or coinsurance for these services. This authorization does not guarantee [facility] will pay the claim for services that you receive. Payment by [facility] for these services is subject to the terms of your Evidence of Coverage or Summary Plan Description, your eligibility at the time of service, and confirmation of benefit coverage.    For any questions or additional information, please contact Customer Service:    [facility]  Customer Service: [phone] or toll free [phone]  TTY users dial: [phone]   Call Center Hours: Mon - Fri 7 AM to 8 PM PST   Office Hours: Mon - Fri 8 AM to 5 PM [facility]   E-mail: [email or web address]   Website: [email or web address]       This information is available for free in other languages. Please contact Customer Service at the phone number above for more information. [facility] complies with applicable Federal civil rights laws and does not discriminate on the basis of race, color, national origin, age, disability  or sex.      Sincerely,     Healthcare Utilization Management Department     Cc: [facility]   Antwan Dwyer

## 2025-04-02 ENCOUNTER — APPOINTMENT (OUTPATIENT)
Dept: ADMISSIONS | Facility: MEDICAL CENTER | Age: 20
End: 2025-04-02
Attending: STUDENT IN AN ORGANIZED HEALTH CARE EDUCATION/TRAINING PROGRAM
Payer: COMMERCIAL

## 2025-04-04 ENCOUNTER — OFFICE VISIT (OUTPATIENT)
Dept: MEDICAL GROUP | Facility: PHYSICIAN GROUP | Age: 20
End: 2025-04-04
Payer: COMMERCIAL

## 2025-04-04 VITALS
HEART RATE: 63 BPM | DIASTOLIC BLOOD PRESSURE: 58 MMHG | SYSTOLIC BLOOD PRESSURE: 92 MMHG | TEMPERATURE: 97.8 F | RESPIRATION RATE: 16 BRPM | WEIGHT: 118 LBS | BODY MASS INDEX: 20.91 KG/M2 | OXYGEN SATURATION: 97 % | HEIGHT: 63 IN

## 2025-04-04 DIAGNOSIS — F40.232 PHOBIA OF DENTAL PROCEDURE: ICD-10-CM

## 2025-04-04 DIAGNOSIS — F41.9 ANXIETY: ICD-10-CM

## 2025-04-04 DIAGNOSIS — E72.01 CYSTINURIA (HCC): ICD-10-CM

## 2025-04-04 PROCEDURE — 3078F DIAST BP <80 MM HG: CPT | Performed by: PHYSICIAN ASSISTANT

## 2025-04-04 PROCEDURE — 99214 OFFICE O/P EST MOD 30 MIN: CPT | Performed by: PHYSICIAN ASSISTANT

## 2025-04-04 PROCEDURE — 3074F SYST BP LT 130 MM HG: CPT | Performed by: PHYSICIAN ASSISTANT

## 2025-04-04 RX ORDER — DIAZEPAM 5 MG/1
5-10 TABLET ORAL EVERY 12 HOURS PRN
Qty: 15 TABLET | Refills: 0 | Status: SHIPPED | OUTPATIENT
Start: 2025-04-04 | End: 2025-04-14

## 2025-04-04 RX ORDER — SERTRALINE HYDROCHLORIDE 25 MG/1
25 TABLET, FILM COATED ORAL DAILY
Qty: 90 TABLET | Refills: 3 | Status: SHIPPED | OUTPATIENT
Start: 2025-04-04

## 2025-04-04 ASSESSMENT — ENCOUNTER SYMPTOMS
CHILLS: 0
FEVER: 0
SHORTNESS OF BREATH: 0

## 2025-04-04 ASSESSMENT — FIBROSIS 4 INDEX: FIB4 SCORE: 0.58

## 2025-04-04 ASSESSMENT — PATIENT HEALTH QUESTIONNAIRE - PHQ9: CLINICAL INTERPRETATION OF PHQ2 SCORE: 0

## 2025-04-04 NOTE — PROGRESS NOTES
"SUBJECTIVE:     CC: Follow-up chronic issues    HPI:   Ivory presents today with the following:    ASSESSMENT & PLAN by Problem:     Problem   Anxiety    Chronic, uncontrolled.  Propranolol works when needed (around 5x/week).  Will retry sertraline 25mg.  Contact information provided to the patient for psychiatry.     Cystinuria (Hcc)    Chronic, uncontrolled  Managed by [facility] urology.  Has a procedure planned for 4/25/2025 and 5/9/2025 for stent placement and stone removal.  The plan is to try some different medications to see if they can slow down stone production.                 Return if symptoms worsen or fail to improve.        HPI:     Problem List Items Addressed This Visit       Anxiety    Chronic, uncontrolled.  Tried:  Buspirone didn't help (highest dose tried 15mg, didn't help)  Hydroxyzine used for panic attacks but too drowsy inducing  zoloft (worked for a very long time then \"stopped\" working), lexapro, prozac, abilify (either didn't help or had side effects); venlafaxine (keeping her awake); citalopram, paroxetine (didn't like the way it made her feel)  Doesn't feel depressed    Interval history 2/2024:  Chronic, controlled.   KACY-7: 15   Has been on medications in the past.  Has seen psychiatry and therapy in the past.  Asking for emotional support letter for her dog to help manage day to day issues.  Would like to see therapy again.  Has been using buspirone 10 mg as needed and it does seem to be helping without making her too drowsy.         Relevant Medications    diazePAM (VALIUM) 5 MG Tab    sertraline (ZOLOFT) 25 MG tablet    Cystinuria (HCC)     Other Visit Diagnoses         Phobia of dental procedure        Relevant Medications    diazePAM (VALIUM) 5 MG Tab                   ROS:  Review of Systems   Constitutional:  Negative for chills and fever.   Respiratory:  Negative for shortness of breath.    Cardiovascular:  Negative for chest pain.       OBJECTIVE:     Exam:  BP 92/58   Pulse " "63   Temp 36.6 °C (97.8 °F) (Temporal)   Resp 16   Ht 1.6 m (5' 3\")   Wt 53.5 kg (118 lb)   LMP 03/04/2025   SpO2 97%   BMI 20.90 kg/m²  Body mass index is 20.9 kg/m².    Physical Exam  Vitals reviewed.   Constitutional:       General: She is not in acute distress.     Appearance: Normal appearance.   Pulmonary:      Effort: Pulmonary effort is normal.   Neurological:      General: No focal deficit present.      Mental Status: She is alert.   Psychiatric:         Mood and Affect: Mood normal.         Behavior: Behavior normal.         Judgment: Judgment normal.           CHART REVIEW:     CHIEF COMPLAINT       Chief Complaint   Patient presents with    Flank Pain       C/o left flank pain off and on x 4 days. + Blood in urine.     N/V       Started tonight and worse now      EXTERNAL RECORDS REVIEWED  The patient's records show the patient was admitted 7/22/24 for an obstructive uropathy. Dr. Cornejo with urology was consulted. She had an 11 mm right UPJ stone. CT on 1/7/25 showed a punctate right kidney stone and 3 by 6 mm stones on the left    Ivory Chávez is a 20 y.o. female who presents to the ED for evaluation of bilateral flank pain and hematuria onset 4 days ago. The patient explains that her symptoms first started with blood in her urine 4 days ago. She then developed bladder pain a few days ago and bilateral flank pain yesterday. Furthermore she reports he pain worsening yesterday and again today which prompted visitation to the ED. She has a history of Cystinuria and multiple stones but the patient reports never having passed a stone until these recent symptoms where she passed multiple stones that are describes as \"sand in my urine\". States associated burning with urination and vomiting/nausea. Furthermore she explains that she last ate at 7 PM last night and reports her vomiting and nausea beginning around 3 hours ago. Denies any fevers or urinary retention but explains that she is " "\"inputting more than outputting\". Patient has a history of frequent UTI's with resistant bacteria in her urine and she adds that her current symptoms feel similar to this. No drug allergies are reported. Patient's LMP was 3 days ago which has since concluded. She has no concerns for STD or pregnancy. Patient is followed by Dr. Harrison (Urology) who she is scheduled to see on Friday. She was last seen by Urology last month     Radiologist interpretation:   US-RENAL   Final Result           1.  Bilateral nephrolithiasis.   2.  Mild dilatation bilateral renal pelvis, could represent extrarenal pelvis morphology versus mild hydronephrosis.     INITIAL ASSESSMENT COURSE AND PLAN  Care Narrative      3:09 AM - Patient was evaluated at bedside. They present for bilateral flank pain, bladder pain, and hematuria. Pertinent PE findings include: Bilateral CVA tenderness right greater than left, uncomfortable female who is tearful. Plan for labs and imaging to evaluate and medication/IV fluids for treatment. Differential diagnoses include but not limited to: Pyelonephritis versus urinary obstruction versus dehydration versus sepsis.       No sepsis, no endorgan dysfunction, lactate is not elevated, tachycardia resolved with pain medications.     Labs notable for no leukocytosis, lactate within normal limits, urinalysis shows small leuk esterase, 6-10 WBCs with moderate bacteria, given symptoms, will treat for lower urinary tract infection.  There is potentially mild hydro versus extrarenal pelvis on ultrasound.     Given these findings in conjunction with recent CT which showed no large stones on the right, the area of primary pain, I have a overall low suspicion for obstructing infected stone.    FINAL DIAGNOSIS  1. Urinary tract infection with hematuria, site unspecified    2. Nausea and vomiting, unspecified vomiting type    3. Nephrolithiasis                    Please note that this dictation was created using voice " recognition software. I have made every reasonable attempt to correct obvious errors, but I expect that there are errors of grammar and possibly content that I did not discover before finalizing the note.

## 2025-04-04 NOTE — ASSESSMENT & PLAN NOTE
"Chronic, uncontrolled.  Tried:  Buspirone didn't help (highest dose tried 15mg, didn't help)  Hydroxyzine used for panic attacks but too drowsy inducing  zoloft (worked for a very long time then \"stopped\" working), lexapro, prozac, abilify (either didn't help or had side effects); venlafaxine (keeping her awake); citalopram, paroxetine (didn't like the way it made her feel)  Doesn't feel depressed    Interval history 2/2024:  Chronic, controlled.   KACY-7: 15   Has been on medications in the past.  Has seen psychiatry and therapy in the past.  Asking for emotional support letter for her dog to help manage day to day issues.  Would like to see therapy again.  Has been using buspirone 10 mg as needed and it does seem to be helping without making her too drowsy.  "

## 2025-04-09 ENCOUNTER — APPOINTMENT (OUTPATIENT)
Dept: ADMISSIONS | Facility: MEDICAL CENTER | Age: 20
End: 2025-04-09
Attending: STUDENT IN AN ORGANIZED HEALTH CARE EDUCATION/TRAINING PROGRAM
Payer: COMMERCIAL

## 2025-04-09 NOTE — PREADMIT AVS NOTE
Current Medications   Medication Instructions    CRANBERRY PO Stop 7 days before surgery    diazePAM (VALIUM) 5 MG Tab Continue taking medication as prescribed, as needed.    sertraline (ZOLOFT) 25 MG tablet Continue taking medication as prescribed, as needed.    phenazopyridine (PYRIDIUM) 200 MG Tab Continue taking medication as prescribed, as needed.    ketorolac (TORADOL) 10 MG Tab Stop 5 days before surgery    ondansetron (ZOFRAN ODT) 4 MG TABLET DISPERSIBLE Continue taking medication as prescribed, as needed.    propranolol (INDERAL) 10 MG Tab Continue taking medication as prescribed, as needed.    albuterol 108 (90 Base) MCG/ACT Aero Soln inhalation aerosol Continue taking medication as prescribed, as needed.    cetirizine (ZYRTEC) 10 MG Tab Continue taking medication as prescribed, as needed.    EPINEPHrine (EPIPEN) 0.3 MG/0.3ML Solution Auto-injector solution for injection Follow instructions from surgeon or specialist.   The above medication instructions are to be followed for both the procedure on 04/25/25 and the procedure on 05/09/25.

## 2025-04-09 NOTE — OR NURSING
Preadmit: Telephone preadmit done with patient scheduled for procedures on 04/25/25 and 05/09/25 with Dr. Dwyer. Pre-procedure instructions, fasting guidelines, check in location, and medication instructions for both procedures reviewed with patient. Patient aware to hold any vitamins, supplements, and aspirin for 7 days prior to each procedure and aleve/ibuprofen/NSAIDS inclduing toradol for 5 days prior to procedure. Patient verbalized understanding of all instructions. Copy of medication instructions available in [employer] in AVS summary. Surgery scheduled notified of patient's latex allergy for both procedures (04/25/25 and 05/09/25).

## 2025-04-10 ENCOUNTER — PATIENT MESSAGE (OUTPATIENT)
Dept: MEDICAL GROUP | Facility: PHYSICIAN GROUP | Age: 20
End: 2025-04-10
Payer: COMMERCIAL

## 2025-04-21 ENCOUNTER — APPOINTMENT (OUTPATIENT)
Dept: ADMISSIONS | Facility: MEDICAL CENTER | Age: 20
End: 2025-04-21
Attending: STUDENT IN AN ORGANIZED HEALTH CARE EDUCATION/TRAINING PROGRAM
Payer: COMMERCIAL

## 2025-04-21 DIAGNOSIS — Z01.812 PRE-OPERATIVE LABORATORY EXAMINATION: ICD-10-CM

## 2025-04-21 LAB
APPEARANCE UR: CLEAR
BILIRUB UR QL STRIP.AUTO: NEGATIVE
COLOR UR: YELLOW
GLUCOSE UR STRIP.AUTO-MCNC: NEGATIVE MG/DL
KETONES UR STRIP.AUTO-MCNC: NEGATIVE MG/DL
LEUKOCYTE ESTERASE UR QL STRIP.AUTO: NEGATIVE
MICRO URNS: NORMAL
NITRITE UR QL STRIP.AUTO: NEGATIVE
PH UR STRIP.AUTO: 7 [PH] (ref 5–8)
PROT UR QL STRIP: NEGATIVE MG/DL
RBC UR QL AUTO: NEGATIVE
SP GR UR STRIP.AUTO: 1.02
UROBILINOGEN UR STRIP.AUTO-MCNC: 0.2 EU/DL

## 2025-04-21 PROCEDURE — 81003 URINALYSIS AUTO W/O SCOPE: CPT

## 2025-04-21 PROCEDURE — 87086 URINE CULTURE/COLONY COUNT: CPT

## 2025-04-23 LAB
BACTERIA UR CULT: NORMAL
SIGNIFICANT IND 70042: NORMAL
SITE SITE: NORMAL
SOURCE SOURCE: NORMAL

## 2025-04-25 ENCOUNTER — APPOINTMENT (OUTPATIENT)
Dept: RADIOLOGY | Facility: MEDICAL CENTER | Age: 20
End: 2025-04-25
Attending: STUDENT IN AN ORGANIZED HEALTH CARE EDUCATION/TRAINING PROGRAM
Payer: COMMERCIAL

## 2025-04-25 ENCOUNTER — PHARMACY VISIT (OUTPATIENT)
Dept: PHARMACY | Facility: MEDICAL CENTER | Age: 20
End: 2025-04-25
Payer: COMMERCIAL

## 2025-04-25 ENCOUNTER — ANESTHESIA (OUTPATIENT)
Dept: SURGERY | Facility: MEDICAL CENTER | Age: 20
End: 2025-04-25
Payer: COMMERCIAL

## 2025-04-25 ENCOUNTER — HOSPITAL ENCOUNTER (OUTPATIENT)
Facility: MEDICAL CENTER | Age: 20
End: 2025-04-25
Attending: STUDENT IN AN ORGANIZED HEALTH CARE EDUCATION/TRAINING PROGRAM | Admitting: STUDENT IN AN ORGANIZED HEALTH CARE EDUCATION/TRAINING PROGRAM
Payer: COMMERCIAL

## 2025-04-25 ENCOUNTER — ANESTHESIA EVENT (OUTPATIENT)
Dept: SURGERY | Facility: MEDICAL CENTER | Age: 20
End: 2025-04-25
Payer: COMMERCIAL

## 2025-04-25 VITALS
WEIGHT: 116.84 LBS | SYSTOLIC BLOOD PRESSURE: 113 MMHG | HEART RATE: 66 BPM | HEIGHT: 63 IN | OXYGEN SATURATION: 96 % | DIASTOLIC BLOOD PRESSURE: 70 MMHG | BODY MASS INDEX: 20.7 KG/M2 | TEMPERATURE: 96.7 F | RESPIRATION RATE: 16 BRPM

## 2025-04-25 DIAGNOSIS — N20.0 CALCULUS OF KIDNEY: ICD-10-CM

## 2025-04-25 PROBLEM — R11.2 PONV (POSTOPERATIVE NAUSEA AND VOMITING): Status: ACTIVE | Noted: 2025-04-25

## 2025-04-25 PROBLEM — Z98.890 PONV (POSTOPERATIVE NAUSEA AND VOMITING): Status: ACTIVE | Noted: 2025-04-25

## 2025-04-25 LAB
HCG UR QL: NEGATIVE
PATHOLOGY CONSULT NOTE: NORMAL

## 2025-04-25 PROCEDURE — 700105 HCHG RX REV CODE 258: Performed by: STUDENT IN AN ORGANIZED HEALTH CARE EDUCATION/TRAINING PROGRAM

## 2025-04-25 PROCEDURE — C1769 GUIDE WIRE: HCPCS | Performed by: STUDENT IN AN ORGANIZED HEALTH CARE EDUCATION/TRAINING PROGRAM

## 2025-04-25 PROCEDURE — 700111 HCHG RX REV CODE 636 W/ 250 OVERRIDE (IP): Performed by: STUDENT IN AN ORGANIZED HEALTH CARE EDUCATION/TRAINING PROGRAM

## 2025-04-25 PROCEDURE — 160048 HCHG OR STATISTICAL LEVEL 1-5: Performed by: STUDENT IN AN ORGANIZED HEALTH CARE EDUCATION/TRAINING PROGRAM

## 2025-04-25 PROCEDURE — 82365 CALCULUS SPECTROSCOPY: CPT

## 2025-04-25 PROCEDURE — 74018 RADEX ABDOMEN 1 VIEW: CPT

## 2025-04-25 PROCEDURE — 160025 RECOVERY II MINUTES (STATS): Performed by: STUDENT IN AN ORGANIZED HEALTH CARE EDUCATION/TRAINING PROGRAM

## 2025-04-25 PROCEDURE — 160028 HCHG SURGERY MINUTES - 1ST 30 MINS LEVEL 3: Performed by: STUDENT IN AN ORGANIZED HEALTH CARE EDUCATION/TRAINING PROGRAM

## 2025-04-25 PROCEDURE — RXMED WILLOW AMBULATORY MEDICATION CHARGE: Performed by: STUDENT IN AN ORGANIZED HEALTH CARE EDUCATION/TRAINING PROGRAM

## 2025-04-25 PROCEDURE — C2617 STENT, NON-COR, TEM W/O DEL: HCPCS | Performed by: STUDENT IN AN ORGANIZED HEALTH CARE EDUCATION/TRAINING PROGRAM

## 2025-04-25 PROCEDURE — 700111 HCHG RX REV CODE 636 W/ 250 OVERRIDE (IP): Mod: JZ | Performed by: STUDENT IN AN ORGANIZED HEALTH CARE EDUCATION/TRAINING PROGRAM

## 2025-04-25 PROCEDURE — C1894 INTRO/SHEATH, NON-LASER: HCPCS | Performed by: STUDENT IN AN ORGANIZED HEALTH CARE EDUCATION/TRAINING PROGRAM

## 2025-04-25 PROCEDURE — 81025 URINE PREGNANCY TEST: CPT

## 2025-04-25 PROCEDURE — 700102 HCHG RX REV CODE 250 W/ 637 OVERRIDE(OP): Performed by: STUDENT IN AN ORGANIZED HEALTH CARE EDUCATION/TRAINING PROGRAM

## 2025-04-25 PROCEDURE — 160039 HCHG SURGERY MINUTES - EA ADDL 1 MIN LEVEL 3: Performed by: STUDENT IN AN ORGANIZED HEALTH CARE EDUCATION/TRAINING PROGRAM

## 2025-04-25 PROCEDURE — 160046 HCHG PACU - 1ST 60 MINS PHASE II: Performed by: STUDENT IN AN ORGANIZED HEALTH CARE EDUCATION/TRAINING PROGRAM

## 2025-04-25 PROCEDURE — 52356 CYSTO/URETERO W/LITHOTRIPSY: CPT | Mod: LT | Performed by: STUDENT IN AN ORGANIZED HEALTH CARE EDUCATION/TRAINING PROGRAM

## 2025-04-25 PROCEDURE — 160036 HCHG PACU - EA ADDL 30 MINS PHASE I: Performed by: STUDENT IN AN ORGANIZED HEALTH CARE EDUCATION/TRAINING PROGRAM

## 2025-04-25 PROCEDURE — A9270 NON-COVERED ITEM OR SERVICE: HCPCS | Performed by: STUDENT IN AN ORGANIZED HEALTH CARE EDUCATION/TRAINING PROGRAM

## 2025-04-25 PROCEDURE — 160035 HCHG PACU - 1ST 60 MINS PHASE I: Performed by: STUDENT IN AN ORGANIZED HEALTH CARE EDUCATION/TRAINING PROGRAM

## 2025-04-25 PROCEDURE — 160015 HCHG STAT PREOP MINUTES: Performed by: STUDENT IN AN ORGANIZED HEALTH CARE EDUCATION/TRAINING PROGRAM

## 2025-04-25 PROCEDURE — 160002 HCHG RECOVERY MINUTES (STAT): Performed by: STUDENT IN AN ORGANIZED HEALTH CARE EDUCATION/TRAINING PROGRAM

## 2025-04-25 PROCEDURE — 160009 HCHG ANES TIME/MIN: Performed by: STUDENT IN AN ORGANIZED HEALTH CARE EDUCATION/TRAINING PROGRAM

## 2025-04-25 PROCEDURE — C1747 HCHG SHELL REV 278 C1747: HCPCS | Performed by: STUDENT IN AN ORGANIZED HEALTH CARE EDUCATION/TRAINING PROGRAM

## 2025-04-25 PROCEDURE — 700101 HCHG RX REV CODE 250: Performed by: STUDENT IN AN ORGANIZED HEALTH CARE EDUCATION/TRAINING PROGRAM

## 2025-04-25 DEVICE — STENT UROLOGICAL POLARIS 6X26 ULTRA: Type: IMPLANTABLE DEVICE | Status: FUNCTIONAL

## 2025-04-25 RX ORDER — CEFAZOLIN SODIUM 1 G/3ML
INJECTION, POWDER, FOR SOLUTION INTRAMUSCULAR; INTRAVENOUS PRN
Status: DISCONTINUED | OUTPATIENT
Start: 2025-04-25 | End: 2025-04-25 | Stop reason: SURG

## 2025-04-25 RX ORDER — HALOPERIDOL 5 MG/ML
1 INJECTION INTRAMUSCULAR
Status: DISCONTINUED | OUTPATIENT
Start: 2025-04-25 | End: 2025-04-25 | Stop reason: HOSPADM

## 2025-04-25 RX ORDER — HYDRALAZINE HYDROCHLORIDE 20 MG/ML
5 INJECTION INTRAMUSCULAR; INTRAVENOUS
Status: DISCONTINUED | OUTPATIENT
Start: 2025-04-25 | End: 2025-04-25 | Stop reason: HOSPADM

## 2025-04-25 RX ORDER — ONDANSETRON 2 MG/ML
INJECTION INTRAMUSCULAR; INTRAVENOUS PRN
Status: DISCONTINUED | OUTPATIENT
Start: 2025-04-25 | End: 2025-04-25 | Stop reason: SURG

## 2025-04-25 RX ORDER — ACETAMINOPHEN 500 MG
1000 TABLET ORAL ONCE
Status: COMPLETED | OUTPATIENT
Start: 2025-04-25 | End: 2025-04-25

## 2025-04-25 RX ORDER — PHENAZOPYRIDINE HYDROCHLORIDE 200 MG/1
200 TABLET, FILM COATED ORAL 3 TIMES DAILY PRN
Qty: 6 TABLET | Refills: 0 | Status: SHIPPED | OUTPATIENT
Start: 2025-04-25 | End: 2025-04-27

## 2025-04-25 RX ORDER — DIPHENHYDRAMINE HYDROCHLORIDE 50 MG/ML
12.5 INJECTION, SOLUTION INTRAMUSCULAR; INTRAVENOUS
Status: DISCONTINUED | OUTPATIENT
Start: 2025-04-25 | End: 2025-04-25 | Stop reason: HOSPADM

## 2025-04-25 RX ORDER — DEXAMETHASONE SODIUM PHOSPHATE 4 MG/ML
INJECTION, SOLUTION INTRA-ARTICULAR; INTRALESIONAL; INTRAMUSCULAR; INTRAVENOUS; SOFT TISSUE PRN
Status: DISCONTINUED | OUTPATIENT
Start: 2025-04-25 | End: 2025-04-25 | Stop reason: SURG

## 2025-04-25 RX ORDER — ONDANSETRON 2 MG/ML
4 INJECTION INTRAMUSCULAR; INTRAVENOUS
Status: COMPLETED | OUTPATIENT
Start: 2025-04-25 | End: 2025-04-25

## 2025-04-25 RX ORDER — ALBUTEROL SULFATE 5 MG/ML
2.5 SOLUTION RESPIRATORY (INHALATION)
Status: DISCONTINUED | OUTPATIENT
Start: 2025-04-25 | End: 2025-04-25 | Stop reason: HOSPADM

## 2025-04-25 RX ORDER — SCOPOLAMINE 1 MG/3D
1 PATCH, EXTENDED RELEASE TRANSDERMAL
Status: DISCONTINUED | OUTPATIENT
Start: 2025-04-25 | End: 2025-04-25 | Stop reason: HOSPADM

## 2025-04-25 RX ORDER — OXYCODONE HCL 5 MG/5 ML
10 SOLUTION, ORAL ORAL
Status: COMPLETED | OUTPATIENT
Start: 2025-04-25 | End: 2025-04-25

## 2025-04-25 RX ORDER — TROSPIUM CHLORIDE ER 60 MG/1
60 CAPSULE ORAL DAILY
Qty: 30 CAPSULE | Refills: 0 | Status: SHIPPED | OUTPATIENT
Start: 2025-04-25

## 2025-04-25 RX ORDER — LABETALOL HYDROCHLORIDE 5 MG/ML
5 INJECTION, SOLUTION INTRAVENOUS
Status: DISCONTINUED | OUTPATIENT
Start: 2025-04-25 | End: 2025-04-25 | Stop reason: HOSPADM

## 2025-04-25 RX ORDER — EPHEDRINE SULFATE 50 MG/ML
5 INJECTION, SOLUTION INTRAVENOUS
Status: DISCONTINUED | OUTPATIENT
Start: 2025-04-25 | End: 2025-04-25 | Stop reason: HOSPADM

## 2025-04-25 RX ORDER — HYDROMORPHONE HYDROCHLORIDE 1 MG/ML
0.2 INJECTION, SOLUTION INTRAMUSCULAR; INTRAVENOUS; SUBCUTANEOUS
Status: DISCONTINUED | OUTPATIENT
Start: 2025-04-25 | End: 2025-04-25 | Stop reason: HOSPADM

## 2025-04-25 RX ORDER — TAMSULOSIN HYDROCHLORIDE 0.4 MG/1
0.4 CAPSULE ORAL
Qty: 30 CAPSULE | Refills: 2 | Status: SHIPPED | OUTPATIENT
Start: 2025-04-25 | End: 2025-07-24

## 2025-04-25 RX ORDER — HYDROMORPHONE HYDROCHLORIDE 1 MG/ML
0.1 INJECTION, SOLUTION INTRAMUSCULAR; INTRAVENOUS; SUBCUTANEOUS
Status: DISCONTINUED | OUTPATIENT
Start: 2025-04-25 | End: 2025-04-25 | Stop reason: HOSPADM

## 2025-04-25 RX ORDER — OXYCODONE HYDROCHLORIDE 5 MG/1
5 TABLET ORAL EVERY 6 HOURS PRN
Qty: 12 TABLET | Refills: 0 | Status: SHIPPED | OUTPATIENT
Start: 2025-04-25 | End: 2025-04-28

## 2025-04-25 RX ORDER — MIDAZOLAM HYDROCHLORIDE 1 MG/ML
INJECTION INTRAMUSCULAR; INTRAVENOUS PRN
Status: DISCONTINUED | OUTPATIENT
Start: 2025-04-25 | End: 2025-04-25 | Stop reason: SURG

## 2025-04-25 RX ORDER — DOXYCYCLINE HYCLATE 100 MG
100 TABLET ORAL 2 TIMES DAILY
COMMUNITY
Start: 2025-03-16

## 2025-04-25 RX ORDER — SODIUM CHLORIDE, SODIUM LACTATE, POTASSIUM CHLORIDE, CALCIUM CHLORIDE 600; 310; 30; 20 MG/100ML; MG/100ML; MG/100ML; MG/100ML
INJECTION, SOLUTION INTRAVENOUS CONTINUOUS
Status: DISCONTINUED | OUTPATIENT
Start: 2025-04-25 | End: 2025-04-25 | Stop reason: HOSPADM

## 2025-04-25 RX ORDER — HYDROMORPHONE HYDROCHLORIDE 1 MG/ML
0.4 INJECTION, SOLUTION INTRAMUSCULAR; INTRAVENOUS; SUBCUTANEOUS
Status: DISCONTINUED | OUTPATIENT
Start: 2025-04-25 | End: 2025-04-25 | Stop reason: HOSPADM

## 2025-04-25 RX ORDER — LIDOCAINE HYDROCHLORIDE 20 MG/ML
INJECTION, SOLUTION EPIDURAL; INFILTRATION; INTRACAUDAL; PERINEURAL PRN
Status: DISCONTINUED | OUTPATIENT
Start: 2025-04-25 | End: 2025-04-25 | Stop reason: SURG

## 2025-04-25 RX ORDER — OXYCODONE HCL 5 MG/5 ML
5 SOLUTION, ORAL ORAL
Status: COMPLETED | OUTPATIENT
Start: 2025-04-25 | End: 2025-04-25

## 2025-04-25 RX ADMIN — MIDAZOLAM HYDROCHLORIDE 2 MG: 1 INJECTION, SOLUTION INTRAMUSCULAR; INTRAVENOUS at 12:25

## 2025-04-25 RX ADMIN — LIDOCAINE HYDROCHLORIDE 80 MG: 20 INJECTION, SOLUTION EPIDURAL; INFILTRATION; INTRACAUDAL; PERINEURAL at 12:28

## 2025-04-25 RX ADMIN — ACETAMINOPHEN 1000 MG: 500 TABLET ORAL at 11:02

## 2025-04-25 RX ADMIN — ONDANSETRON 4 MG: 2 INJECTION INTRAMUSCULAR; INTRAVENOUS at 12:40

## 2025-04-25 RX ADMIN — ONDANSETRON 4 MG: 2 INJECTION INTRAMUSCULAR; INTRAVENOUS at 13:31

## 2025-04-25 RX ADMIN — PROPOFOL 200 MCG/KG/MIN: 10 INJECTION, EMULSION INTRAVENOUS at 12:29

## 2025-04-25 RX ADMIN — FENTANYL CITRATE 25 MCG: 50 INJECTION, SOLUTION INTRAMUSCULAR; INTRAVENOUS at 13:49

## 2025-04-25 RX ADMIN — FENTANYL CITRATE 50 MCG: 50 INJECTION, SOLUTION INTRAMUSCULAR; INTRAVENOUS at 13:36

## 2025-04-25 RX ADMIN — SODIUM CHLORIDE, POTASSIUM CHLORIDE, SODIUM LACTATE AND CALCIUM CHLORIDE: 600; 310; 30; 20 INJECTION, SOLUTION INTRAVENOUS at 12:24

## 2025-04-25 RX ADMIN — OXYCODONE HYDROCHLORIDE 10 MG: 5 SOLUTION ORAL at 13:29

## 2025-04-25 RX ADMIN — FENTANYL CITRATE 25 MCG: 50 INJECTION, SOLUTION INTRAMUSCULAR; INTRAVENOUS at 13:57

## 2025-04-25 RX ADMIN — PROPOFOL 200 MG: 10 INJECTION, EMULSION INTRAVENOUS at 12:28

## 2025-04-25 RX ADMIN — HYDROMORPHONE HYDROCHLORIDE 0.2 MG: 1 INJECTION, SOLUTION INTRAMUSCULAR; INTRAVENOUS; SUBCUTANEOUS at 14:34

## 2025-04-25 RX ADMIN — CEFAZOLIN 2 G: 1 INJECTION, POWDER, FOR SOLUTION INTRAMUSCULAR; INTRAVENOUS at 12:25

## 2025-04-25 RX ADMIN — SCOPOLAMINE 1 PATCH: 1.5 PATCH, EXTENDED RELEASE TRANSDERMAL at 11:02

## 2025-04-25 RX ADMIN — DEXAMETHASONE SODIUM PHOSPHATE 8 MG: 4 INJECTION INTRA-ARTICULAR; INTRALESIONAL; INTRAMUSCULAR; INTRAVENOUS; SOFT TISSUE at 12:28

## 2025-04-25 ASSESSMENT — PAIN DESCRIPTION - PAIN TYPE
TYPE: SURGICAL PAIN
TYPE: ACUTE PAIN
TYPE: SURGICAL PAIN
TYPE: SURGICAL PAIN

## 2025-04-25 ASSESSMENT — FIBROSIS 4 INDEX: FIB4 SCORE: 0.58

## 2025-04-25 ASSESSMENT — PAIN SCALES - GENERAL: PAIN_LEVEL: 2

## 2025-04-25 NOTE — DISCHARGE INSTRUCTIONS
HOME CARE INSTRUCTIONS    ACTIVITY: Rest and take it easy for the first 24 hours.  A responsible adult is recommended to remain with you during that time.  It is normal to feel sleepy.  We encourage you to not do anything that requires balance, judgment or coordination.    FOR 24 HOURS DO NOT:  Drive, operate machinery or run household appliances.  Drink beer or alcoholic beverages.  Make important decisions or sign legal documents.    SPECIAL INSTRUCTIONS: see above    The patient will be discharged home with plan for repeat left ureteroscopy with laser lithotripsy and steerable vacuum aspiration.         DIET: To avoid nausea, slowly advance diet as tolerated, avoiding spicy or greasy foods for the first day.  Add more substantial food to your diet according to your physician's instructions.  INCREASE FLUIDS AND FIBER TO AVOID CONSTIPATION.      MEDICATIONS: Resume taking daily medication.  Take prescribed pain medication with food.  If no medication is prescribed, you may take non-aspirin pain medication if needed.  PAIN MEDICATION CAN BE VERY CONSTIPATING.  Take a stool softener or laxative such as senokot, pericolace, or milk of magnesia if needed.    Prescription given for flomax, pyridium, and trospium.  Last pain medication given at 11am (Tylenol), 1:30pm (oxycodone).    A follow-up appointment should be arranged with your doctor in 1-2 weeks; call to schedule.    You should CALL YOUR PHYSICIAN if you develop:  Fever greater than 101 degrees F.  Pain not relieved by medication, or persistent nausea or vomiting.  Excessive bleeding (blood soaking through dressing) or unexpected drainage from the wound.  Extreme redness or swelling around the incision site, drainage of pus or foul smelling drainage.  Inability to urinate or empty your bladder within 8 hours.  Problems with breathing or chest pain.    You should call 911 if you develop problems with breathing or chest pain.  If you are unable to contact your  doctor or surgical center, you should go to the nearest emergency room or urgent care center.  Physician's telephone #: [phone]    MILD FLU-LIKE SYMPTOMS ARE NORMAL.  YOU MAY EXPERIENCE GENERALIZED MUSCLE ACHES, THROAT IRRITATION, HEADACHE AND/OR SOME NAUSEA.    If any questions arise, call your doctor.  If your doctor is not available, please feel free to call the Surgical Center at [phone].  The Center is open Monday through Friday from 7AM to 7PM.      A registered nurse may call you a few days after your surgery to see how you are doing after your procedure.    You may also receive a survey in the mail within the next two weeks and we ask that you take a few moments to complete the survey and return it to us.  Our goal is to provide you with very good care and we value your comments.     Depression / Suicide Risk    As you are discharged from this [facility] facility, it is important to learn how to keep safe from harming yourself.    Recognize the warning signs:  Abrupt changes in personality, positive or negative- including increase in energy   Giving away possessions  Change in eating patterns- significant weight changes-  positive or negative  Change in sleeping patterns- unable to sleep or sleeping all the time   Unwillingness or inability to communicate  Depression  Unusual sadness, discouragement and loneliness  Talk of wanting to die  Neglect of personal appearance   Rebelliousness- reckless behavior  Withdrawal from people/activities they love  Confusion- inability to concentrate     If you or a loved one observes any of these behaviors or has concerns about self-harm, here's what you can do:  Talk about it- your feelings and reasons for harming yourself  Remove any means that you might use to hurt yourself (examples: pills, rope, extension cords, firearm)  Get professional help from the community (Mental Health, Substance Abuse, psychological counseling)  Do not be alone:Call your Safe  Contact- someone whom you trust who will be there for you.  Call your local CRISIS HOTLINE 883-6266 or 415-214-3867  Call your local Children's Mobile Crisis Response Team [place] (464) 393-4253 or www.[employer]  Call the toll free National Suicide Prevention Hotlines   National Suicide Prevention Lifeline 245-798-DWTG (9691)  [facility] Line Network 800-CIPDANJ (634-2963)    I acknowledge receipt and understanding of these Home Care instructions.

## 2025-04-25 NOTE — ANESTHESIA PREPROCEDURE EVALUATION
Case: [id number] Date/Time: 04/25/25 1215    Procedures:       CYSTOSCOPY WITH LEFT URETEROSCOPY, LASER LITHOTRIPSY, STENT PLACEMENT      URETEROSCOPY      LITHOTRIPSY, USING LASER    Pre-op diagnosis: KIDNEY STONE    Location: [facility] OR 18 / SURGERY [facility]    Surgeons: Antwan Dwyer M.D.            Relevant Problems   ANESTHESIA   (positive) PONV (postoperative nausea and vomiting)         (positive) Bilateral hydronephrosis   (positive) Cystinuria (HCC)   (positive) Nephrolithiasis   (positive) Pyelonephritis      Other   (positive) Anxiety       Physical Exam    Airway   Mallampati: II  TM distance: >3 FB  Neck ROM: full       Cardiovascular - normal exam  Rhythm: regular  Rate: normal     Dental - normal exam           Pulmonary - normal exam  Breath sounds clear to auscultation     Abdominal    Neurological - normal exam                   Anesthesia Plan    ASA 2       Plan - general       Airway plan will be LMA          Induction: intravenous    Postoperative Plan: Postoperative administration of opioids is intended.    Pertinent diagnostic labs and testing reviewed    Informed Consent:    Anesthetic plan and risks discussed with patient.    Use of blood products discussed with: patient whom consented to blood products.

## 2025-04-25 NOTE — OR NURSING
Patient recovered well in post-op. AAOx4. VSS, on RA. Surgical sites MIRACLE, L stent without strings. Surgical pain managed through PO and IV medications. Patient tolerating oral fluids without nausea. Friend updated and discussed POC. No belongings in pacu. Report called to MAITE Gaines. Patient transported to phase II.     Dr. Dwyer at bedside with paper rx for patient to take to compounding pharmacy. RX on chart for discharge.     RX x2 sent to [facility]. CNA picked up and delivered to bedside in phase II.

## 2025-04-25 NOTE — OR NURSING
Pt's VSS; denies N/V; states pain is at tolerable level. D/c orders received. IV dc'd. Pt changed into clothing with assistance. Pt up and ambulated to BR, steady gait, voided adequately. Discharge instructions given as well as pain management handout; pt and family verbalized understanding and questions answered. Patient states ready to d/c home. Prescriptions with patient. Pt dc'd in w/c with RN in stable condition.

## 2025-04-25 NOTE — OP REPORT
SURGEON: Dr. Antwan Dwyer      ANESTHESIA: General (general endotracheal tube)      PRE-OPERATIVE DIAGNOSIS: left intra-renal stone    POST-OPERATIVE DIAGNOSIS: Same      NAME OF PROCEDURE: Cystoscopy, left ureteroscopy with laser lithotripsy, stone basket extraction, physician interpretation of fluoroscopy total time <1 hour, and 6Fr 26cm JJ ureteral stent placement    FINDINGS OF PROCEDURE:    Multiple left intra-renal stones, lithotripsy and stone basket extraction  Placement of left 6x26 JJ ureteral stent       EBL: 0 cc      COMPLICATIONS: None      PATIENT CONDITION: stable      INDICATIONS: Ivory Chávez is a 20 y.o. female who agreed to above procedure for further management of kidney stones after complete discussion of risks, benefits, and alternatives.      PROCEDURE:     After informed consent was obtained in the preoperative care unit, the patient was taken to the OR on a stretcher. The patient was properly identified and placed in supine position per OR protocol. The patient was given a prophylactic dose of ancef 2 grams. General (general endotracheal tube) was administered. The patient was then placed in dorsal lithotomy, prepped and draped in a standard sterile fashion.  A timeout was performed with all parties in agreement.       A 22Fr rigid cystoscope was inserted per urethra. A full inspection of the bladder was completed. There were no lesions or masses, the UOs were in orthotopic position effluxing clear urine. Two sensor wires were passed into the left ureteral orifice up to the level of the kidney, confirmed under fluoroscopy.     A 11/13Fr x 36cm ureteral access sheath was passed over one of the sensor wires and advanced to the level of the UPJ under fluoroscopic guidance. The inner lumen and one of the sensor wires was removed. The flexible ureteroscope was assembled and advanced through the sheath to the level of the kidney under direct vision, where a thorough pyeloscopy was  performed. I proceeded with laser lithotripsy. The stone basket was used to remove several large remaining stone fragments. Visibility became too poor to continue due to the large stone burden. I removed the ureteroscope and ureteral access sheath under direct vision to perform a final ureteral survey. No stone fragments or ureteral injuries were seen.    A [id number] JJ ureteral stent was passed over the remaining sensor wire and into the kidney, with it’s position confirmed under fluoroscopic guidance. The wire was removed and a good proximal and distal curl were noted in the renal pelvis and bladder respectively with fluoroscopy.  I drained the patient's bladder . This concluded the procedure. The patient tolerated it well and was transferred to the PACU in stable condition.        DISPOSITION: The patient will be discharged home with plan for  repeat left ureteroscopy with laser lithotripsy and steerable vacuum aspiration .

## 2025-04-25 NOTE — PROGRESS NOTES
Pharmacy Medication Reconciliation      ~Medication reconciliation updated and complete per patient   ~Allergies have been verified and updated     ~Is dispense history available in EPIC: yes  ~Patient home pharmacy :  [facility] [phone]      ~Anticoagulants (rivaroxaban, apixaban, edoxaban, dabigatran, warfarin, enoxaparin) taken in the last 14 days? No

## 2025-04-25 NOTE — ANESTHESIA POSTPROCEDURE EVALUATION
Patient: Ivory Chávez    Procedure Summary       Date: 04/25/25 Room / Location: [place] [address] / SURGERY [facility]    Anesthesia Start: 1224 Anesthesia Stop: 1327    Procedures:       CYSTOSCOPY WITH LEFT URETEROSCOPY, LASER LITHOTRIPSY, STENT PLACEMENT (Bladder)      URETEROSCOPY (Left: Ureter)      LITHOTRIPSY, USING LASER Diagnosis: (LEFT KIDNEY STONE)    Surgeons: Antwan Dwyer M.D. Responsible Provider:     Anesthesia Type: general ASA Status: 2            Final Anesthesia Type: general  Last vitals  BP   Blood Pressure: 127/88    Temp   35.9 °C (96.7 °F)    Pulse   60   Resp   20    SpO2   100 %      Anesthesia Post Evaluation    Patient location during evaluation: PACU  Patient participation: complete - patient participated  Level of consciousness: awake and alert  Pain score: 2    Airway patency: patent  Anesthetic complications: no  Cardiovascular status: hemodynamically stable  Respiratory status: acceptable  Hydration status: euvolemic    PONV: none          There were no known notable events for this encounter.     Nurse Pain Score: 2 (NPRS)

## 2025-04-25 NOTE — ANESTHESIA TIME REPORT
Anesthesia Start and Stop Event Times       Date Time Event    4/25/2025 1021 Ready for Procedure     1224 Anesthesia Start     1327 Anesthesia Stop          Responsible Staff    No responsible staff documented.       Overtime Reason:  no overtime (within assigned shift)    Comments:

## 2025-04-29 LAB
APPEARANCE STONE: NORMAL
COMPN STONE: NORMAL
SPECIMEN WT: 78 MG

## 2025-05-05 ENCOUNTER — PRE-ADMISSION TESTING (OUTPATIENT)
Dept: ADMISSIONS | Facility: MEDICAL CENTER | Age: 20
End: 2025-05-05
Attending: STUDENT IN AN ORGANIZED HEALTH CARE EDUCATION/TRAINING PROGRAM
Payer: COMMERCIAL

## 2025-05-05 DIAGNOSIS — Z01.812 PRE-OPERATIVE LABORATORY EXAMINATION: ICD-10-CM

## 2025-05-05 LAB
ALBUMIN SERPL BCP-MCNC: 4.5 G/DL (ref 3.2–4.9)
ALBUMIN/GLOB SERPL: 1.6 G/DL
ALP SERPL-CCNC: 62 U/L (ref 30–99)
ALT SERPL-CCNC: 14 U/L (ref 2–50)
ANION GAP SERPL CALC-SCNC: 10 MMOL/L (ref 7–16)
APPEARANCE UR: ABNORMAL
AST SERPL-CCNC: 17 U/L (ref 12–45)
BACTERIA #/AREA URNS HPF: ABNORMAL /HPF
BILIRUB SERPL-MCNC: 0.7 MG/DL (ref 0.1–1.5)
BILIRUB UR QL STRIP.AUTO: NEGATIVE
BUN SERPL-MCNC: 19 MG/DL (ref 8–22)
CALCIUM ALBUM COR SERPL-MCNC: 9.2 MG/DL (ref 8.5–10.5)
CALCIUM SERPL-MCNC: 9.6 MG/DL (ref 8.5–10.5)
CASTS URNS QL MICRO: ABNORMAL /LPF (ref 0–2)
CHLORIDE SERPL-SCNC: 103 MMOL/L (ref 96–112)
CO2 SERPL-SCNC: 26 MMOL/L (ref 20–33)
COLOR UR: YELLOW
CREAT SERPL-MCNC: 1.01 MG/DL (ref 0.5–1.4)
EPITHELIAL CELLS 1715: ABNORMAL /HPF (ref 0–5)
GFR SERPLBLD CREATININE-BSD FMLA CKD-EPI: 81 ML/MIN/1.73 M 2
GLOBULIN SER CALC-MCNC: 2.9 G/DL (ref 1.9–3.5)
GLUCOSE SERPL-MCNC: 88 MG/DL (ref 65–99)
GLUCOSE UR STRIP.AUTO-MCNC: NEGATIVE MG/DL
KETONES UR STRIP.AUTO-MCNC: NEGATIVE MG/DL
LEUKOCYTE ESTERASE UR QL STRIP.AUTO: ABNORMAL
Lab: PRESENT /HPF
MICRO URNS: ABNORMAL
NITRITE UR QL STRIP.AUTO: NEGATIVE
PH UR STRIP.AUTO: 7 [PH] (ref 5–8)
POTASSIUM SERPL-SCNC: 4.1 MMOL/L (ref 3.6–5.5)
PROT SERPL-MCNC: 7.4 G/DL (ref 6–8.2)
PROT UR QL STRIP: 300 MG/DL
RBC # URNS HPF: >100 /HPF (ref 0–2)
RBC UR QL AUTO: ABNORMAL
SODIUM SERPL-SCNC: 139 MMOL/L (ref 135–145)
SP GR UR STRIP.AUTO: 1.02
UROBILINOGEN UR STRIP.AUTO-MCNC: 1 EU/DL
WBC #/AREA URNS HPF: ABNORMAL /HPF

## 2025-05-05 PROCEDURE — 81001 URINALYSIS AUTO W/SCOPE: CPT

## 2025-05-05 PROCEDURE — 87086 URINE CULTURE/COLONY COUNT: CPT

## 2025-05-05 PROCEDURE — 36415 COLL VENOUS BLD VENIPUNCTURE: CPT

## 2025-05-05 PROCEDURE — 80053 COMPREHEN METABOLIC PANEL: CPT

## 2025-05-06 NOTE — OR NURSING
Results of U/A faxed to Dr. Dwyer's office for MD review. Confirmation report of fax received. Results of urine culture pending

## 2025-05-07 DIAGNOSIS — R10.2 PELVIC PAIN: ICD-10-CM

## 2025-05-07 LAB
BACTERIA UR CULT: NORMAL
SIGNIFICANT IND 70042: NORMAL
SITE SITE: NORMAL
SOURCE SOURCE: NORMAL

## 2025-05-08 ENCOUNTER — TELEPHONE (OUTPATIENT)
Dept: UROLOGY | Facility: MEDICAL CENTER | Age: 20
End: 2025-05-08
Payer: COMMERCIAL

## 2025-05-09 ENCOUNTER — APPOINTMENT (OUTPATIENT)
Dept: RADIOLOGY | Facility: MEDICAL CENTER | Age: 20
End: 2025-05-09
Attending: STUDENT IN AN ORGANIZED HEALTH CARE EDUCATION/TRAINING PROGRAM
Payer: COMMERCIAL

## 2025-05-09 ENCOUNTER — PHARMACY VISIT (OUTPATIENT)
Dept: PHARMACY | Facility: MEDICAL CENTER | Age: 20
End: 2025-05-09
Payer: COMMERCIAL

## 2025-05-09 ENCOUNTER — ANESTHESIA (OUTPATIENT)
Dept: SURGERY | Facility: MEDICAL CENTER | Age: 20
End: 2025-05-09
Payer: COMMERCIAL

## 2025-05-09 ENCOUNTER — TELEPHONE (OUTPATIENT)
Dept: UROLOGY | Facility: MEDICAL CENTER | Age: 20
End: 2025-05-09

## 2025-05-09 ENCOUNTER — ANESTHESIA EVENT (OUTPATIENT)
Dept: SURGERY | Facility: MEDICAL CENTER | Age: 20
End: 2025-05-09
Payer: COMMERCIAL

## 2025-05-09 ENCOUNTER — HOSPITAL ENCOUNTER (OUTPATIENT)
Facility: MEDICAL CENTER | Age: 20
End: 2025-05-09
Attending: STUDENT IN AN ORGANIZED HEALTH CARE EDUCATION/TRAINING PROGRAM | Admitting: STUDENT IN AN ORGANIZED HEALTH CARE EDUCATION/TRAINING PROGRAM
Payer: COMMERCIAL

## 2025-05-09 VITALS
RESPIRATION RATE: 16 BRPM | HEIGHT: 63 IN | TEMPERATURE: 97 F | OXYGEN SATURATION: 96 % | BODY MASS INDEX: 20.51 KG/M2 | DIASTOLIC BLOOD PRESSURE: 64 MMHG | WEIGHT: 115.74 LBS | SYSTOLIC BLOOD PRESSURE: 117 MMHG | HEART RATE: 71 BPM

## 2025-05-09 DIAGNOSIS — N20.0 CALCULUS OF KIDNEY: ICD-10-CM

## 2025-05-09 LAB
HCG UR QL: NEGATIVE
PATHOLOGY CONSULT NOTE: NORMAL

## 2025-05-09 PROCEDURE — A9270 NON-COVERED ITEM OR SERVICE: HCPCS | Performed by: STUDENT IN AN ORGANIZED HEALTH CARE EDUCATION/TRAINING PROGRAM

## 2025-05-09 PROCEDURE — C1769 GUIDE WIRE: HCPCS | Performed by: STUDENT IN AN ORGANIZED HEALTH CARE EDUCATION/TRAINING PROGRAM

## 2025-05-09 PROCEDURE — RXMED WILLOW AMBULATORY MEDICATION CHARGE: Performed by: STUDENT IN AN ORGANIZED HEALTH CARE EDUCATION/TRAINING PROGRAM

## 2025-05-09 PROCEDURE — 700111 HCHG RX REV CODE 636 W/ 250 OVERRIDE (IP): Performed by: STUDENT IN AN ORGANIZED HEALTH CARE EDUCATION/TRAINING PROGRAM

## 2025-05-09 PROCEDURE — 160025 RECOVERY II MINUTES (STATS): Performed by: STUDENT IN AN ORGANIZED HEALTH CARE EDUCATION/TRAINING PROGRAM

## 2025-05-09 PROCEDURE — 88300 SURGICAL PATH GROSS: CPT | Performed by: PATHOLOGY

## 2025-05-09 PROCEDURE — 160035 HCHG PACU - 1ST 60 MINS PHASE I: Performed by: STUDENT IN AN ORGANIZED HEALTH CARE EDUCATION/TRAINING PROGRAM

## 2025-05-09 PROCEDURE — C2617 STENT, NON-COR, TEM W/O DEL: HCPCS | Performed by: STUDENT IN AN ORGANIZED HEALTH CARE EDUCATION/TRAINING PROGRAM

## 2025-05-09 PROCEDURE — 160009 HCHG ANES TIME/MIN: Performed by: STUDENT IN AN ORGANIZED HEALTH CARE EDUCATION/TRAINING PROGRAM

## 2025-05-09 PROCEDURE — 160015 HCHG STAT PREOP MINUTES: Performed by: STUDENT IN AN ORGANIZED HEALTH CARE EDUCATION/TRAINING PROGRAM

## 2025-05-09 PROCEDURE — C9761 CYSTO, LITHO, VACUUM KIDNEY: HCPCS | Performed by: STUDENT IN AN ORGANIZED HEALTH CARE EDUCATION/TRAINING PROGRAM

## 2025-05-09 PROCEDURE — 700102 HCHG RX REV CODE 250 W/ 637 OVERRIDE(OP): Performed by: STUDENT IN AN ORGANIZED HEALTH CARE EDUCATION/TRAINING PROGRAM

## 2025-05-09 PROCEDURE — 700101 HCHG RX REV CODE 250: Performed by: STUDENT IN AN ORGANIZED HEALTH CARE EDUCATION/TRAINING PROGRAM

## 2025-05-09 PROCEDURE — C1894 INTRO/SHEATH, NON-LASER: HCPCS | Performed by: STUDENT IN AN ORGANIZED HEALTH CARE EDUCATION/TRAINING PROGRAM

## 2025-05-09 PROCEDURE — 81025 URINE PREGNANCY TEST: CPT

## 2025-05-09 PROCEDURE — 82365 CALCULUS SPECTROSCOPY: CPT

## 2025-05-09 PROCEDURE — 700105 HCHG RX REV CODE 258: Performed by: STUDENT IN AN ORGANIZED HEALTH CARE EDUCATION/TRAINING PROGRAM

## 2025-05-09 PROCEDURE — 88300 SURGICAL PATH GROSS: CPT | Mod: 26 | Performed by: PATHOLOGY

## 2025-05-09 PROCEDURE — 160028 HCHG SURGERY MINUTES - 1ST 30 MINS LEVEL 3: Performed by: STUDENT IN AN ORGANIZED HEALTH CARE EDUCATION/TRAINING PROGRAM

## 2025-05-09 PROCEDURE — 160002 HCHG RECOVERY MINUTES (STAT): Performed by: STUDENT IN AN ORGANIZED HEALTH CARE EDUCATION/TRAINING PROGRAM

## 2025-05-09 PROCEDURE — 160046 HCHG PACU - 1ST 60 MINS PHASE II: Performed by: STUDENT IN AN ORGANIZED HEALTH CARE EDUCATION/TRAINING PROGRAM

## 2025-05-09 PROCEDURE — 160039 HCHG SURGERY MINUTES - EA ADDL 1 MIN LEVEL 3: Performed by: STUDENT IN AN ORGANIZED HEALTH CARE EDUCATION/TRAINING PROGRAM

## 2025-05-09 PROCEDURE — 160048 HCHG OR STATISTICAL LEVEL 1-5: Performed by: STUDENT IN AN ORGANIZED HEALTH CARE EDUCATION/TRAINING PROGRAM

## 2025-05-09 PROCEDURE — 74018 RADEX ABDOMEN 1 VIEW: CPT

## 2025-05-09 PROCEDURE — 700111 HCHG RX REV CODE 636 W/ 250 OVERRIDE (IP): Mod: JZ | Performed by: STUDENT IN AN ORGANIZED HEALTH CARE EDUCATION/TRAINING PROGRAM

## 2025-05-09 DEVICE — STENT UROLOGICAL POLARIS 6X26 ULTRA: Type: IMPLANTABLE DEVICE | Site: URETER | Status: FUNCTIONAL

## 2025-05-09 RX ORDER — OXYCODONE HYDROCHLORIDE 5 MG/1
5 TABLET ORAL EVERY 4 HOURS PRN
COMMUNITY

## 2025-05-09 RX ORDER — ONDANSETRON 2 MG/ML
4 INJECTION INTRAMUSCULAR; INTRAVENOUS
Status: DISCONTINUED | OUTPATIENT
Start: 2025-05-09 | End: 2025-05-09 | Stop reason: HOSPADM

## 2025-05-09 RX ORDER — LIDOCAINE HYDROCHLORIDE 20 MG/ML
INJECTION, SOLUTION EPIDURAL; INFILTRATION; INTRACAUDAL; PERINEURAL PRN
Status: DISCONTINUED | OUTPATIENT
Start: 2025-05-09 | End: 2025-05-09 | Stop reason: SURG

## 2025-05-09 RX ORDER — LABETALOL HYDROCHLORIDE 5 MG/ML
5 INJECTION, SOLUTION INTRAVENOUS
Status: DISCONTINUED | OUTPATIENT
Start: 2025-05-09 | End: 2025-05-09 | Stop reason: HOSPADM

## 2025-05-09 RX ORDER — PHENAZOPYRIDINE HYDROCHLORIDE 200 MG/1
200 TABLET, FILM COATED ORAL 3 TIMES DAILY PRN
Qty: 6 TABLET | Refills: 0 | Status: SHIPPED | OUTPATIENT
Start: 2025-05-09 | End: 2025-05-11

## 2025-05-09 RX ORDER — SODIUM CHLORIDE, SODIUM LACTATE, POTASSIUM CHLORIDE, CALCIUM CHLORIDE 600; 310; 30; 20 MG/100ML; MG/100ML; MG/100ML; MG/100ML
INJECTION, SOLUTION INTRAVENOUS
Status: DISCONTINUED | OUTPATIENT
Start: 2025-05-09 | End: 2025-05-09 | Stop reason: SURG

## 2025-05-09 RX ORDER — ONDANSETRON 2 MG/ML
INJECTION INTRAMUSCULAR; INTRAVENOUS PRN
Status: DISCONTINUED | OUTPATIENT
Start: 2025-05-09 | End: 2025-05-09 | Stop reason: SURG

## 2025-05-09 RX ORDER — OXYCODONE AND ACETAMINOPHEN 5; 325 MG/1; MG/1
1 TABLET ORAL
Status: COMPLETED | OUTPATIENT
Start: 2025-05-09 | End: 2025-05-09

## 2025-05-09 RX ORDER — POLYETHYLENE GLYCOL 3350 17 G/17G
17 POWDER, FOR SOLUTION ORAL DAILY
Qty: 3 PACKET | Refills: 0 | Status: SHIPPED | OUTPATIENT
Start: 2025-05-09 | End: 2025-05-12

## 2025-05-09 RX ORDER — OXYCODONE HYDROCHLORIDE 5 MG/1
5 TABLET ORAL EVERY 6 HOURS PRN
Qty: 12 TABLET | Refills: 0 | Status: SHIPPED | OUTPATIENT
Start: 2025-05-09 | End: 2025-05-12

## 2025-05-09 RX ORDER — MIDAZOLAM HYDROCHLORIDE 1 MG/ML
1 INJECTION INTRAMUSCULAR; INTRAVENOUS
Status: DISCONTINUED | OUTPATIENT
Start: 2025-05-09 | End: 2025-05-09 | Stop reason: HOSPADM

## 2025-05-09 RX ORDER — DIPHENHYDRAMINE HYDROCHLORIDE 50 MG/ML
12.5 INJECTION, SOLUTION INTRAMUSCULAR; INTRAVENOUS
Status: DISCONTINUED | OUTPATIENT
Start: 2025-05-09 | End: 2025-05-09 | Stop reason: HOSPADM

## 2025-05-09 RX ORDER — HYDRALAZINE HYDROCHLORIDE 20 MG/ML
5 INJECTION INTRAMUSCULAR; INTRAVENOUS
Status: DISCONTINUED | OUTPATIENT
Start: 2025-05-09 | End: 2025-05-09 | Stop reason: HOSPADM

## 2025-05-09 RX ORDER — ALBUTEROL SULFATE 5 MG/ML
2.5 SOLUTION RESPIRATORY (INHALATION)
Status: DISCONTINUED | OUTPATIENT
Start: 2025-05-09 | End: 2025-05-09 | Stop reason: HOSPADM

## 2025-05-09 RX ORDER — SCOPOLAMINE 1 MG/3D
PATCH, EXTENDED RELEASE TRANSDERMAL
Status: COMPLETED
Start: 2025-05-09 | End: 2025-05-09

## 2025-05-09 RX ORDER — CEFAZOLIN SODIUM 1 G/3ML
INJECTION, POWDER, FOR SOLUTION INTRAMUSCULAR; INTRAVENOUS PRN
Status: DISCONTINUED | OUTPATIENT
Start: 2025-05-09 | End: 2025-05-09 | Stop reason: SURG

## 2025-05-09 RX ORDER — SCOPOLAMINE 1 MG/3D
1 PATCH, EXTENDED RELEASE TRANSDERMAL
Status: DISCONTINUED | OUTPATIENT
Start: 2025-05-09 | End: 2025-05-09 | Stop reason: HOSPADM

## 2025-05-09 RX ORDER — HALOPERIDOL 5 MG/ML
INJECTION INTRAMUSCULAR PRN
Status: DISCONTINUED | OUTPATIENT
Start: 2025-05-09 | End: 2025-05-09 | Stop reason: SURG

## 2025-05-09 RX ORDER — DEXAMETHASONE SODIUM PHOSPHATE 4 MG/ML
INJECTION, SOLUTION INTRA-ARTICULAR; INTRALESIONAL; INTRAMUSCULAR; INTRAVENOUS; SOFT TISSUE PRN
Status: DISCONTINUED | OUTPATIENT
Start: 2025-05-09 | End: 2025-05-09 | Stop reason: SURG

## 2025-05-09 RX ORDER — OXYCODONE AND ACETAMINOPHEN 5; 325 MG/1; MG/1
2 TABLET ORAL
Status: COMPLETED | OUTPATIENT
Start: 2025-05-09 | End: 2025-05-09

## 2025-05-09 RX ORDER — NEOSTIGMINE METHYLSULFATE 1 MG/ML
INJECTION INTRAVENOUS PRN
Status: DISCONTINUED | OUTPATIENT
Start: 2025-05-09 | End: 2025-05-09 | Stop reason: SURG

## 2025-05-09 RX ORDER — SODIUM CHLORIDE, SODIUM LACTATE, POTASSIUM CHLORIDE, CALCIUM CHLORIDE 600; 310; 30; 20 MG/100ML; MG/100ML; MG/100ML; MG/100ML
INJECTION, SOLUTION INTRAVENOUS CONTINUOUS
Status: DISCONTINUED | OUTPATIENT
Start: 2025-05-09 | End: 2025-05-09 | Stop reason: HOSPADM

## 2025-05-09 RX ORDER — MEPERIDINE HYDROCHLORIDE 25 MG/ML
6.25 INJECTION INTRAMUSCULAR; INTRAVENOUS; SUBCUTANEOUS
Status: DISCONTINUED | OUTPATIENT
Start: 2025-05-09 | End: 2025-05-09 | Stop reason: HOSPADM

## 2025-05-09 RX ORDER — HALOPERIDOL 5 MG/ML
1 INJECTION INTRAMUSCULAR
Status: DISCONTINUED | OUTPATIENT
Start: 2025-05-09 | End: 2025-05-09 | Stop reason: HOSPADM

## 2025-05-09 RX ADMIN — SCOPOLAMINE 1 PATCH: 1.5 PATCH, EXTENDED RELEASE TRANSDERMAL at 10:50

## 2025-05-09 RX ADMIN — SODIUM CHLORIDE, POTASSIUM CHLORIDE, SODIUM LACTATE AND CALCIUM CHLORIDE: 600; 310; 30; 20 INJECTION, SOLUTION INTRAVENOUS at 11:08

## 2025-05-09 RX ADMIN — ROCURONIUM BROMIDE 30 MG: 10 INJECTION INTRAVENOUS at 11:10

## 2025-05-09 RX ADMIN — HALOPERIDOL LACTATE 1 MG: 5 INJECTION, SOLUTION INTRAMUSCULAR at 11:44

## 2025-05-09 RX ADMIN — OXYCODONE HYDROCHLORIDE AND ACETAMINOPHEN 1 TABLET: 5; 325 TABLET ORAL at 12:51

## 2025-05-09 RX ADMIN — ONDANSETRON 4 MG: 2 INJECTION INTRAMUSCULAR; INTRAVENOUS at 12:32

## 2025-05-09 RX ADMIN — FENTANYL CITRATE 25 MCG: 50 INJECTION, SOLUTION INTRAMUSCULAR; INTRAVENOUS at 12:52

## 2025-05-09 RX ADMIN — FENTANYL CITRATE 50 MCG: 50 INJECTION, SOLUTION INTRAMUSCULAR; INTRAVENOUS at 12:32

## 2025-05-09 RX ADMIN — PROPOFOL 150 MG: 10 INJECTION, EMULSION INTRAVENOUS at 11:09

## 2025-05-09 RX ADMIN — LIDOCAINE HYDROCHLORIDE 50 MG: 20 INJECTION, SOLUTION EPIDURAL; INFILTRATION; INTRACAUDAL; PERINEURAL at 11:09

## 2025-05-09 RX ADMIN — FENTANYL CITRATE 25 MCG: 50 INJECTION, SOLUTION INTRAMUSCULAR; INTRAVENOUS at 11:23

## 2025-05-09 RX ADMIN — ROCURONIUM BROMIDE 20 MG: 10 INJECTION INTRAVENOUS at 11:33

## 2025-05-09 RX ADMIN — DEXAMETHASONE SODIUM PHOSPHATE 4 MG: 4 INJECTION INTRA-ARTICULAR; INTRALESIONAL; INTRAMUSCULAR; INTRAVENOUS; SOFT TISSUE at 11:12

## 2025-05-09 RX ADMIN — FENTANYL CITRATE 25 MCG: 50 INJECTION, SOLUTION INTRAMUSCULAR; INTRAVENOUS at 11:08

## 2025-05-09 RX ADMIN — GLYCOPYRROLATE 0.6 MG: 0.2 INJECTION INTRAMUSCULAR; INTRAVENOUS at 12:31

## 2025-05-09 RX ADMIN — CEFAZOLIN 2 G: 1 INJECTION, POWDER, FOR SOLUTION INTRAMUSCULAR; INTRAVENOUS at 11:12

## 2025-05-09 RX ADMIN — NEOSTIGMINE METHYLSULFATE 3 MG: 1 INJECTION INTRAVENOUS at 12:31

## 2025-05-09 ASSESSMENT — FIBROSIS 4 INDEX: FIB4 SCORE: 0.42

## 2025-05-09 ASSESSMENT — PAIN DESCRIPTION - PAIN TYPE
TYPE: ACUTE PAIN;SURGICAL PAIN
TYPE: SURGICAL PAIN
TYPE: ACUTE PAIN;SURGICAL PAIN

## 2025-05-09 ASSESSMENT — PAIN SCALES - GENERAL: PAIN_LEVEL: 5

## 2025-05-09 NOTE — DISCHARGE INSTRUCTIONS
HOME CARE INSTRUCTIONS    ACTIVITY: Rest and take it easy for the first 24 hours.  A responsible adult is recommended to remain with you during that time.  It is normal to feel sleepy.  We encourage you to not do anything that requires balance, judgment or coordination.    FOR 24 HOURS DO NOT:  Drive, operate machinery or run household appliances.  Drink beer or alcoholic beverages.  Make important decisions or sign legal documents.    SPECIAL INSTRUCTIONS: May shower tomorrow. Make follow up appointment with Dr. Dwyer to schedule stent removal procedure.     DIET: To avoid nausea, slowly advance diet as tolerated, avoiding spicy or greasy foods for the first day.  Add more substantial food to your diet according to your physician's instructions.  Babies can be fed formula or breast milk as soon as they are hungry.  INCREASE FLUIDS AND FIBER TO AVOID CONSTIPATION.      MEDICATIONS: Resume taking daily medication.  Take prescribed pain medication with food.  If no medication is prescribed, you may take non-aspirin pain medication if needed.  PAIN MEDICATION CAN BE VERY CONSTIPATING.  Take a stool softener or laxative such as senokot, pericolace, or milk of magnesia if needed.    Prescription given for PYRIDIUM, OXYCODONE AND MIRALAX.  Last OXYCODONE given at 12:51.     A follow-up appointment should be arranged with your doctor in ONE WEEK; call to schedule.    You should CALL YOUR PHYSICIAN if you develop:  Fever greater than 101 degrees F.  Pain not relieved by medication, or persistent nausea or vomiting.  Excessive bleeding (blood soaking through dressing) or unexpected drainage from the wound.  Extreme redness or swelling around the incision site, drainage of pus or foul smelling drainage.  Inability to urinate or empty your bladder within 8 hours.  Problems with breathing or chest pain.    You should call 911 if you develop problems with breathing or chest pain.  If you are unable to contact your doctor or  surgical center, you should go to the nearest emergency room or urgent care center.  DR. NORRIS'S telephone #: [phone]     MILD FLU-LIKE SYMPTOMS ARE NORMAL.  YOU MAY EXPERIENCE GENERALIZED MUSCLE ACHES, THROAT IRRITATION, HEADACHE AND/OR SOME NAUSEA.    If any questions arise, call your doctor.  If your doctor is not available, please feel free to call the Surgical Center at [phone].  The Center is open Monday through Friday from 7AM to 7PM.      A registered nurse may call you a few days after your surgery to see how you are doing after your procedure.    You may also receive a survey in the mail within the next two weeks and we ask that you take a few moments to complete the survey and return it to us.  Our goal is to provide you with very good care and we value your comments.     Depression / Suicide Risk    As you are discharged from this [facility] facility, it is important to learn how to keep safe from harming yourself.    Recognize the warning signs:  Abrupt changes in personality, positive or negative- including increase in energy   Giving away possessions  Change in eating patterns- significant weight changes-  positive or negative  Change in sleeping patterns- unable to sleep or sleeping all the time   Unwillingness or inability to communicate  Depression  Unusual sadness, discouragement and loneliness  Talk of wanting to die  Neglect of personal appearance   Rebelliousness- reckless behavior  Withdrawal from people/activities they love  Confusion- inability to concentrate     If you or a loved one observes any of these behaviors or has concerns about self-harm, here's what you can do:  Talk about it- your feelings and reasons for harming yourself  Remove any means that you might use to hurt yourself (examples: pills, rope, extension cords, firearm)  Get professional help from the community (Mental Health, Substance Abuse, psychological counseling)  Do not be alone:Call your Safe Contact-  someone whom you trust who will be there for you.  Call your local CRISIS HOTLINE 636-8959 or 539-073-5342  Call your local Children's Mobile Crisis Response Team [place] (453) 604-2261 or www.IDOMOTICS  Call the toll free National Suicide Prevention Hotlines   National Suicide Prevention Lifeline 276-043-WDYA (8558)  [facility] 800-NJNONXV (387-6673)    I acknowledge receipt and understanding of these Home Care instructions.

## 2025-05-09 NOTE — PROGRESS NOTES
Pharmacy Medication Reconciliation      ~Medication reconciliation updated and complete per patient   ~Allergies have been verified and updated   ~No oral ABX within the last 30 days  ~Is dispense history available in EPIC: yes  ~Patient home pharmacy :  [facility] [phone]      ~Anticoagulants (rivaroxaban, apixaban, edoxaban, dabigatran, warfarin, enoxaparin) taken in the last 14 days? No

## 2025-05-09 NOTE — ANESTHESIA POSTPROCEDURE EVALUATION
Patient: Ivory Chávez    Procedure Summary       Date: 05/09/25 Room / Location: [place] [address] / SURGERY [facility]    Anesthesia Start: 1108 Anesthesia Stop: 1245    Procedures:       CYSTOSCOPY WITH LEFT URETRAL STENT INSERTION (Bladder)      URETEROSCOPY (Left: Ureter)      LITHOTRIPSY, USING LASER (Left: Ureter) Diagnosis: (KIDNEY STONE)    Surgeons: Antwan Dwyer M.D. Responsible Provider: Delonte Campoverde M.D.    Anesthesia Type: general ASA Status: 2            Final Anesthesia Type: general  Last vitals  BP   Blood Pressure: 123/79    Temp   37 °C (98.6 °F)    Pulse   69   Resp   18    SpO2   97 %      Anesthesia Post Evaluation    Patient location during evaluation: PACU  Patient participation: complete - patient participated  Level of consciousness: awake  Pain score: 5    Airway patency: patent  Anesthetic complications: no  Cardiovascular status: hemodynamically stable  Respiratory status: acceptable and face mask  Hydration status: euvolemic    PONV: none          No notable events documented.     Nurse Pain Score: 5 (NPRS)

## 2025-05-09 NOTE — ANESTHESIA TIME REPORT
Anesthesia Start and Stop Event Times       Date Time Event    5/9/2025 1051 Ready for Procedure     1108 Anesthesia Start     1245 Anesthesia Stop          Responsible Staff  05/09/25      Name Role Begin End    Delonte Campoverde M.D. Anesth 1108 1245          Overtime Reason:  no overtime (within assigned shift)    Comments:

## 2025-05-09 NOTE — OR NURSING
1242 - Patient arrived from OR via gurney. Report received from Anesthesia and Nursing staff. Vitals stable, no distress noted, orders reviewed and released.     1340 - Patient transported to Phase 2 via gurney accompanied by RN. Vitals stable, no distress noted, belongings sent with patient. Family updated, Report given to MAITE Gaines.

## 2025-05-09 NOTE — ANESTHESIA PROCEDURE NOTES
Airway    Date/Time: 5/9/2025 11:11 AM    Performed by: Delonte Campoverde M.D.  Authorized by: Delonte Campoverde M.D.    Location:  OR  Urgency:  Elective  Difficult Airway: No    Indications for Airway Management:  Anesthesia      Spontaneous Ventilation: absent    Sedation Level:  Deep  Preoxygenated: Yes    Patient Position:  Sniffing  Mask Difficulty Assessment:  0 - not attempted  Final Airway Type:  Endotracheal airway  Final Endotracheal Airway:  ETT  Cuffed: Yes    Technique Used for Successful ETT Placement:  Direct laryngoscopy    Insertion Site:  Oral  Blade Type:  Sunshine  Laryngoscope Blade/Videolaryngoscope Blade Size:  3  ETT Size (mm):  6.5  Measured from:  Teeth  ETT to Teeth (cm):  22  Placement Verified by: auscultation and capnometry    Cormack-Lehane Classification:  Grade I - full view of glottis  Number of Attempts at Approach:  1

## 2025-05-09 NOTE — OP REPORT
SURGEON: Dr. Antwan Dwyer      ANESTHESIA: General (general endotracheal tube)      PRE-OPERATIVE DIAGNOSIS: left intra-renal stone    POST-OPERATIVE DIAGNOSIS: Same      NAME OF PROCEDURE: Cystoscopy, left ureteroscopy with laser lithotripsy, physician interpretation of fluoroscopy total time <1 hour, 6Fr 26 cm JJ ureteral stent placement on a tether, and Steerable vacuum stone extraction    FINDINGS OF PROCEDURE:     Moderate left intra-renal stone burden removed with laser lithotripsy and steerable vacuum stone extraction  Placement of left 6x26 JJ ureteral stent on a string    EBL: 2 cc      COMPLICATIONS: None      PATIENT CONDITION: stable      INDICATIONS: Ivory Chávez is a 20 y.o. female who agreed to above procedure for further management of kidney stones after complete discussion of risks, benefits, and alternatives.      PROCEDURE:     After informed consent was obtained in the preoperative care unit, the patient was taken to the OR on a stretcher. The patient was properly identified and placed in supine position per OR protocol. The patient was given a prophylactic dose of ancef 2 grams. General (general endotracheal tube) was administered. The patient was then placed in dorsal lithotomy, prepped and draped in a standard sterile fashion.  A timeout was performed with all parties in agreement.       A 22Fr rigid cystoscope was inserted per urethra. A full inspection of the bladder was completed. There were no lesions or masses, the UOs were in orthotopic position effluxing clear urine. A sensor wire was passed through the left ureteral stent up to the level of the kidney, confirmed under fluoroscopy. A second sensor wire was passed into the left ureteral orifice up to the level of the kidney, confirmed under fluoroscopy.    A 13/15 36 cm ureteral access sheath was passed over one of the sensor wires and advanced to the level of the UPJ under fluoroscopic guidance. The inner lumen and one of  the sensor wires was removed. A single use disposable CVAC aspiration ureteroscope was used to inspect the full collecting  system and identify all stones requiring lithotripsy and/or steerable vacuum extraction. A holmium laser fiber was inserted into the laser bridge and the fiber/bridge was inserted into the  CVAC system. Stone was broken to fragments approximately 1.5mm in largest dimension or  smaller using the laser. The laser fiber was used as a fiducial for visual assessment of fragment  size to confirm completion of lithotripsy. From time to time, the laser fiber  and laser bridge were withdrawn and intermittent vacuum aspiration was used to extract stone  fragments and dust through the CVAC aspiration system and then the fiber/bridge was  reinserted. The full collecting system was surveyed with the single use CVAC ureteroscope. Steerable vacuum aspiration was performed using continuous fluid irrigation, increased fluid flow to mobilize stones, and intermittent vacuum aspiration. Fluid outflow was  monitored to confirm effluent contained stone fragments. Upon completion of vacuum aspiration  of the primary stone fragment locations, the full collecting system was navigated and  irrigation/vacuum continued to collect fragments moving through the collecting system. This was  performed until no fragments were visible.  The full collecting system was surveyed with the CVAC ureteroscope and there were no clinically significant residual fragments seen. There was no injury or significant bleeding    A [id number] JJ ureteral stent on a tether was passed over the remaining sensor wire and into the kidney, with it’s position confirmed under fluoroscopic guidance. The wire was removed and a good proximal and distal curl were noted in the renal pelvis and bladder respectively with fluoroscopy.  I drained the patients bladder . This concluded the procedure. The patient tolerated it well and was transferred to the PACU in  stable condition.        DISPOSITION: The patient will be discharged home with plan for stent on a tether removal in 3 days .

## 2025-05-09 NOTE — ANESTHESIA PREPROCEDURE EVALUATION
Case: [id number] Date/Time: 05/09/25 1145    Procedures:       CYSTOSCOPY, WITH LEFT URETEROSCOPY, LASER LITHOTRIPSY, STENT PLACEMENT      URETEROSCOPY      LITHOTRIPSY, USING LASER    Pre-op diagnosis: KIDNEY STONE    Location: [facility] OR 18 / SURGERY [facility]    Surgeons: Antwan Dwyer M.D.            Relevant Problems   ANESTHESIA   (positive) PONV (postoperative nausea and vomiting)         (positive) Bilateral hydronephrosis   (positive) Cystinuria (HCC)   (positive) Nephrolithiasis   (positive) Pyelonephritis       Physical Exam    Airway   Mallampati: II  TM distance: >3 FB  Neck ROM: full       Cardiovascular    Dental    Pulmonary    Abdominal    Neurological                Anesthesia Plan    ASA 2       Plan - general       Airway plan will be ETT          Induction: intravenous    Postoperative Plan: Postoperative administration of opioids is intended.    Pertinent diagnostic labs and testing reviewed    Informed Consent:    Anesthetic plan and risks discussed with patient.

## 2025-05-12 ENCOUNTER — OFFICE VISIT (OUTPATIENT)
Dept: UROLOGY | Facility: MEDICAL CENTER | Age: 20
End: 2025-05-12
Payer: COMMERCIAL

## 2025-05-12 DIAGNOSIS — R10.2 PELVIC PAIN: ICD-10-CM

## 2025-05-12 DIAGNOSIS — N32.81 OVERACTIVE BLADDER: ICD-10-CM

## 2025-05-12 DIAGNOSIS — N20.0 KIDNEY STONE: ICD-10-CM

## 2025-05-12 PROCEDURE — 99214 OFFICE O/P EST MOD 30 MIN: CPT | Performed by: STUDENT IN AN ORGANIZED HEALTH CARE EDUCATION/TRAINING PROGRAM

## 2025-05-12 PROCEDURE — RXMED WILLOW AMBULATORY MEDICATION CHARGE: Performed by: STUDENT IN AN ORGANIZED HEALTH CARE EDUCATION/TRAINING PROGRAM

## 2025-05-12 RX ORDER — POTASSIUM CITRATE 15 MEQ/1
30 TABLET, EXTENDED RELEASE ORAL
Qty: 120 TABLET | Refills: 3 | Status: SHIPPED | OUTPATIENT
Start: 2025-05-12 | End: 2026-05-07

## 2025-05-12 NOTE — PROGRESS NOTES
Subjective  CHIEF COMPLAINT:    Patient presents to the office today to discuss:    1. ureteroscopy with laser lithotripsy follow-up  2. Kidney stone management  3. Recurrent UTIs    PAST UROLOGICAL HISTORY:    Patient has a history of recurrent kidney stones requiring multiple ureteroscopy procedures. Previous imaging showed multiple stones in the kidney. Patient was previously on Thiola which was discontinued. Has been experiencing recurrent UTIs. Recent lab work showed stable renal function.     HPI TODAY 05/12/2025:    Procedure: Ureteroscopy with laser lithotripsy and vacuum aspiration  Post-op course: Recovering well, minimal discomfort now that the stent is removed    - Reports feeling better since stent removal. Experiencing some burning sensation but otherwise doing well.  - Has all medication refills.  - First procedure revealed approximately 8 moderate-sized stones. Second procedure was performed to completely clear remaining 3-4 moderate stones and tiny debris pieces using vacuum technology.  - Stones are yellow in appearance, consistent with previous stones.      Family History   Problem Relation Age of Onset    No Known Problems Mother     No Known Problems Father     No Known Problems Sister     No Known Problems Sister     No Known Problems Brother     No Known Problems Maternal Grandmother     No Known Problems Maternal Grandfather     No Known Problems Paternal Grandmother     No Known Problems Paternal Grandfather     Cancer Neg Hx     Diabetes Neg Hx     Heart Disease Neg Hx     Hyperlipidemia Neg Hx     Hypertension Neg Hx     Stroke Neg Hx        Social History     Socioeconomic History    Marital status: Single     Spouse name: Not on file    Number of children: Not on file    Years of education: Not on file    Highest education level: Associate degree: occupational, technical, or vocational program   Occupational History    Not on file   Tobacco Use    Smoking status: Never    Smokeless  tobacco: Never   Vaping Use    Vaping status: Former    Substances: Nicotine    Devices: Disposable   Substance and Sexual Activity    Alcohol use: Yes     Comment: socially, 1-2 x per month, 3-4 drinks each time, mostly wine    Drug use: Never    Sexual activity: Yes     Partners: Male     Birth control/protection: Injection   Other Topics Concern    Behavioral problems No    Interpersonal relationships No    Sad or not enjoying activities No    Suicidal thoughts No    Poor school performance No    Reading difficulties No    Speech difficulties No    Writing difficulties No    Inadequate sleep No    Excessive TV viewing No    Excessive video game use No    Inadequate exercise No    Sports related No    Poor diet No    Family concerns for drug/alcohol abuse No    Poor oral hygiene No    Bike safety No    Family concerns vehicle safety No   Social History Narrative    Not on file     Social Drivers of Health     Financial Resource Strain: Low Risk  (7/22/2024)    Overall Financial Resource Strain (CARDIA)     Difficulty of Paying Living Expenses: Not very hard   Food Insecurity: Food Insecurity Present (7/22/2024)    Hunger Vital Sign     Worried About Running Out of Food in the Last Year: Sometimes true     Ran Out of Food in the Last Year: Never true   Transportation Needs: No Transportation Needs (7/22/2024)    PRAPARE - Transportation     Lack of Transportation (Medical): No     Lack of Transportation (Non-Medical): No   Physical Activity: Sufficiently Active (7/22/2024)    Exercise Vital Sign     Days of Exercise per Week: 5 days     Minutes of Exercise per Session: 60 min   Stress: Stress Concern Present (7/22/2024)    [employer] - Occupational Stress Questionnaire     Feeling of Stress : Rather much   Social Connections: Socially Isolated (7/22/2024)    Social Connection and Isolation Panel [NHANES]     Frequency of Communication with Friends and Family: More than three times a  week     Frequency of Social Gatherings with Friends and Family: More than three times a week     Attends Mandaeism Services: Never     Active Member of Clubs or Organizations: No     Attends Club or Organization Meetings: Never     Marital Status: Never    Intimate Partner Violence: Not At Risk (7/23/2024)    Humiliation, Afraid, Rape, and Kick questionnaire     Fear of Current or Ex-Partner: No     Emotionally Abused: No     Physically Abused: No     Sexually Abused: No   Housing Stability: High Risk (7/22/2024)    Housing Stability Vital Sign     Unable to Pay for Housing in the Last Year: Yes     Number of Places Lived in the Last Year: 1     Unstable Housing in the Last Year: No       Past Surgical History:   Procedure Laterality Date    NM CYSTOSCOPY,INSERT URETERAL STENT N/A 5/9/2025    Procedure: CYSTOSCOPY WITH LEFT URETRAL STENT INSERTION;  Surgeon: Antwan Dwyer M.D.;  Location: [facility];  Service: Urology    NM CYSTO/URETERO/PYELOSCOPY, DX Left 5/9/2025    Procedure: URETEROSCOPY;  Surgeon: Antwan Dwyer M.D.;  Location: [facility];  Service: Urology    LASERTRIPSY Left 5/9/2025    Procedure: LITHOTRIPSY, USING LASER;  Surgeon: Antwan Dwyer M.D.;  Location: [facility];  Service: Urology    NM CYSTO/URETERO/PYELOSCOPY, DX Left 4/25/2025    Procedure: URETEROSCOPY;  Surgeon: Antwan Dwyer M.D.;  Location: [facility];  Service: Urology    CYSTOSCOPY WITH URETERAL STENT INSERTION OR REMOVAL  4/25/2025    Procedure: CYSTOSCOPY WITH LEFT URETEROSCOPY, LASER LITHOTRIPSY, STENT PLACEMENT;  Surgeon: Antwan Dwyer M.D.;  Location: [facility];  Service: Urology    LASERTRIPSY Left 4/25/2025    Procedure: LITHOTRIPSY, USING LASER;  Surgeon: Antwan Dwyer M.D.;  Location: [facility];  Service: Urology    NM CYSTOSCOPY,INSERT URETERAL STENT Right 7/22/2024    Procedure: CYSTOSCOPY, WITH URETERAL STENT INSERTION;  Surgeon: Darren Cornejo M.D.;   Location: [facility];  Service: Urology    CYSTOSCOPY WITH URETERAL STENT INSERTION OR REMOVAL Bilateral 8/27/2023    Procedure: CYSTOSCOPY, WITH URETERAL STENT INSERTION OR REMOVAL;  Surgeon: Danny Harrison M.D.;  Location: [facility];  Service: Urology    LASERTRIPSY Bilateral 8/27/2023    Procedure: LITHOTRIPSY, USING LASER;  Surgeon: Danny Harrison M.D.;  Location: [facility];  Service: Urology    NC CYSTOSCOPY,INSERT URETERAL STENT N/A 3/16/2023    Procedure: CYSTOSCOPY;  Surgeon: Nick Ramirez M.D.;  Location: [facility];  Service: Urology    NC CYSTO/URETERO/PYELOSCOPY, DX Left 3/16/2023    Procedure: URETEROSCOPY;  Surgeon: Nick Ramirez M.D.;  Location: [facility];  Service: Urology    NC CYSTOSCOPY,INSERT URETERAL STENT Left 3/7/2022    Procedure: CYSTOSCOPY, WITH URETERAL STENT INSERTION;  Surgeon: Nick Ramirez M.D.;  Location: [facility];  Service: Urology    NC CYSTO/URETERO/PYELOSCOPY, DX Left 3/7/2022    Procedure: URETEROSCOPY;  Surgeon: Nick Ramirez M.D.;  Location: [facility];  Service: Urology    LASERTRIPSY Left 3/7/2022    Procedure: LITHOTRIPSY, USING LASER;  Surgeon: Nick Ramirez M.D.;  Location: [facility];  Service: Urology    NC CYSTOSCOPY,INSERT URETERAL STENT Left 2/24/2022    Procedure: CYSTOSCOPY, WITH URETERAL STENT INSERTION;  Surgeon: Baljeet Yung M.D.;  Location: [facility];  Service: Urology    OTHER ORTHOPEDIC SURGERY  2021    foot - bone shaved    NC CYSTOSCOPY,INSERT URETERAL STENT Left 7/1/2020    Procedure: CYSTOSCOPY, WITH URETERAL STENT INSERTION;  Surgeon: Rafiq Gómez M.D.;  Location: [facility];  Service: Urology    NC CYSTO/URETERO/PYELOSCOPY, DX Left 7/1/2020    Procedure: URETEROSCOPY;  Surgeon: Rafiq Gómez M.D.;  Location: [facility];  Service: Urology    LASERTRIPSY  7/1/2020    Procedure: LITHOTRIPSY, USING LASER;   Surgeon: Rafiq Gómez M.D.;  Location: SUR[facility]ERY [facility];  Service: Urology    TX CYSTOSCOPY,INSERT URETERAL STENT Left 8/19/2019    Procedure: CYSTOSCOPY, WITH URETERAL STENT INSERTION;  Surgeon: Rafiq Gómez M.D.;  Location: SUR[facility]ERY [facility];  Service: Urology    LASERTRIPSY Left 8/19/2019    Procedure: LITHOTRIPSY, USING LASER;  Surgeon: Rafiq Gómez M.D.;  Location: SUR[facility]ERY [facility];  Service: Urology    URETEROSCOPY Left 11/13/2018    Procedure: URETEROSCOPY;  Surgeon: Francisco Sherman M.D.;  Location: SUR[facility]ERY [facility];  Service: Urology    STENT PLACEMENT Left 11/13/2018    Procedure: STENT PLACEMENT;  Surgeon: Francisco Sherman M.D.;  Location: [facility];  Service: Urology    CYSTOSCOPY  11/13/2018    Procedure: CYSTOSCOPY;  Surgeon: Francisco Sherman M.D.;  Location: SUR[facility]ERY [facility];  Service: Urology    LITHOTRIPSY Left 11/13/2018    Procedure: LITHOTRIPSY;  Surgeon: Francisco Sherman M.D.;  Location: SUR[facility]ERY [facility];  Service: Urology    PERCUTANEOUS NEPHROSTOLITHOTOMY Left 11/5/2018    Procedure: PERCUTANEOUS NEPHROSTOLITHOTOMY (PCNL);  Surgeon: Danny Harrison M.D.;  Location: [facility];  Service: Urology    STENT REPLACEMENT Left 11/5/2018    Procedure: STENT REPLACEMENT;  Surgeon: Danny Harrison M.D.;  Location: [facility];  Service: Urology    OTHER  2006    cyst excision to buttock       Past Medical History:   Diagnosis Date    Allergy     latex    Anxiety     Chronic low blood pressure 04/09/2025    states is only symptomatic 1x/month, takes albuterol inhaler to raise BP    Cystinuria (HCC)     Cystinuria (HCC)     Heart burn 04/09/2025    takes omeprazole prn, hasn't needed in last year    Nondisplaced bimalleolar fracture of left lower leg, initial encounter for closed fracture 2007    left lower leg fracture, splinted, no surgery    Pneumonia 03/2024    PONV (postoperative nausea and vomiting)      Psychiatric problem     OCD, anxiety, bipolar    Renal disorder     Kidney stone     Urinary retention 04/09/2025    states has had off an on for 2 years    Urinary tract infection     pt states she frequent UTIs due to the kidney stones       Current Outpatient Medications   Medication Sig Dispense Refill    Potassium Citrate 15 MEQ (1620 MG) Tab CR Take 30 mEq by mouth 2 times a day for 360 days. 120 Tablet 3    oxyCODONE immediate-release (ROXICODONE) 5 MG Tab Take 5 mg by mouth every four hours as needed for Severe Pain.      oxyCODONE immediate-release (ROXICODONE) 5 MG Tab Take 1 Tablet by mouth every 6 hours as needed for Severe Pain for up to 3 days. 12 Tablet 0    polyethylene glycol/lytes (MIRALAX) 17 g Pack Take 1 Packet by mouth every day for 3 days. 3 Packet 0    NON SPECIFIED Vaginal suppository, insert into vagina BID as needed for pelvic pain  Diazepam 10mg, Baclofen 10mg, Lidocaine 25mg 20 Each 3    NON SPECIFIED Vaginal suppository, insert into vagina twice daily as needed for pelvic pain  Diazepam 10mg, Baclofen 10mg, Lidocaine 25mg 20 Each 3    Menthol-Methyl Salicylate (TIGER BALM LINIMENT EX) Apply 1 Application topically as needed (pain).      tamsulosin (FLOMAX) 0.4 MG capsule Take 1 Capsule by mouth 1/2 hour after breakfast for 90 days. 30 Capsule 2    Trospium Chloride 60 MG CAPSULE SR 24 HR Take 1 Capsule by mouth every day. 30 Capsule 0    NON SPECIFIED Diazepam 10mg, Baclofen 10mg, Lidocaine 25mg  Insert into vagina twice daily as needed for stent pain 20 Each 3    sertraline (ZOLOFT) 25 MG tablet Take 1 Tablet by mouth every day. 90 Tablet 3    phenazopyridine (PYRIDIUM) 200 MG Tab Take 1 Tablet by mouth 3 times a day as needed for Mild Pain or Moderate Pain. 6 Tablet 0    ketorolac (TORADOL) 10 MG Tab Take 1 Tablet by mouth every four hours as needed for Moderate Pain or Severe Pain. Do not exceed more than 4 times daily for 5 days 30 Tablet 0    ondansetron (ZOFRAN ODT) 4 MG  TABLET DISPERSIBLE Take 1 Tablet by mouth every 6 hours as needed for Nausea/Vomiting. 10 Tablet 0    propranolol (INDERAL) 10 MG Tab TAKE 1-2 TABLETS BY MOUTH 3 TIMES A DAY AS NEEDED (ANXIETY). 540 Tablet 1    albuterol 108 (90 Base) MCG/ACT Aero Soln inhalation aerosol Inhale 2 Puffs every 6 hours as needed for Shortness of Breath. 8.5 g 0    cetirizine (ZYRTEC) 10 MG Tab Take 10 mg by mouth every day.      EPINEPHrine (EPIPEN) 0.3 MG/0.3ML Solution Auto-injector solution for injection INJECT THE CONTENTS OF THE EPIPEN INTO THE THIGH, HOLD FOR 3 SECONDS AND RELEASE FROM THIGH. 1 Each 5     No current facility-administered medications for this visit.       Allergies   Allergen Reactions    Latex Anaphylaxis       Objective  There were no vitals taken for this visit.  Physical Exam  Constitutional:       Appearance: Normal appearance.   HENT:      Head: Normocephalic and atraumatic.   Pulmonary:      Effort: Pulmonary effort is normal.   Skin:     General: Skin is warm and dry.   Neurological:      General: No focal deficit present.      Mental Status: She is alert.   Psychiatric:         Mood and Affect: Mood normal.         Behavior: Behavior normal.         Labs:   BMP   Lab Results   Component Value Date/Time    SODIUM 139 05/05/2025 0906    POTASSIUM 4.1 05/05/2025 0906    CHLORIDE 103 05/05/2025 0906    CO2 26 05/05/2025 0906    GLUCOSE 88 05/05/2025 0906    BUN 19 05/05/2025 0906    CREATININE 1.01 05/05/2025 0906    CALCIUM 9.6 05/05/2025 0906       Imaging:   CT RENAL COLIC   CT-RENAL COLIC EVALUATION(A/P W/O) 03/19/2025    Narrative  3/19/2025 6:23 PM    HISTORY/REASON FOR EXAM:  Nephrolithiasis.    TECHNIQUE/EXAM DESCRIPTION AND NUMBER OF VIEWS:  CT scan renal/colic without contrast.    5 mm helical images of the abdomen and pelvis were obtained from the diaphragmatic domes through the pubic symphysis.    Low dose optimization technique was utilized for this CT exam including automated exposure control  and adjustment of the mA and/or kV according to patient size.    COMPARISON: CT renal colic evaluation, 1/7/2025.    FINDINGS:  Renal stone: There are multiple stones in the collecting system of the left kidney, the largest in the upper pole having a partial staghorn configuration measuring 1.3 cm in diameter. The largest stone in the lower pole measures 6.2 mm diameter. There is  a punctate papillary calcification in the interpolar region of the right kidney measuring 2.5 mm in diameter. No hydronephrosis.    Lung Bases:    No pulmonary nodules at the lung bases. No pleural or pericardial fluid.    Abdomen:    Within the limits of noncontrast technique:    Liver: Unremarkable.    Spleen: Unremarkable.    Pancreas: Unremarkable.    Gallbladder: No calcified stones.    Biliary: No biliary dilatation.    Adrenal glands: Nonenlarged.    Bowel: No bowel wall thickening or bowel dilatation. Normal appendix.    Lymph nodes: No adenopathy.    Vasculature: The abdominal aorta is normal in caliber    Peritoneum: Unremarkable without ascites.    Musculoskeletal: No aggressive osseous lesion.    Pelvis:    No obvious abnormality seen in the pelvic structures but evaluation limited on CT.    No lymph node enlargement. There is an IUD in uterine segment endometrial cavity, appropriately positioned. The bladder is unremarkable.    No free fluid, or free air in the abdomen or pelvis.    No aggressive bone lesions are seen.    Impression  1. Nonobstructing stones in both kidneys, the largest in the upper pole left kidney with a partial staghorn configuration measuring 1.3 cm in diameter.    2. No evidence of inflammatory change in the abdomen or pelvis. The study is limited due to nonuse of intravenous contrast.       Assessment    1. Cystine Nephrolithiasis (N20.0)    - Assessment: Successfully treated multiple kidney stones with ureteroscopy and laser lithotripsy. Complete clearance achieved with second procedure using vacuum  technology. Small stone noted on contralateral kidney that appears to be subepithelial.  - Plan: Renal ultrasound in 6 weeks to assess kidney status. Annual renal ultrasound thereafter for surveillance. Will consider 24-hour urine collection after 6-week follow-up to evaluate metabolic factors. Will start potassium citrate 30 mg twice daily for stone prevention.  - Counseling: Discussed stone formation mechanisms including Bucky's plaque and crystallization within collecting system. Stones can cluster together and often form in lower pole due to dependent position. Discussed dietary modifications including benefits of whole food vegan diet versus Mediterranean diet with vegetarian days. Emphasized importance of avoiding processed foods and maintaining moderate sodium intake (less than 2500 mg daily). Encouraged continued high fluid intake.    2. Recurrent urinary tract infections (N39.0)    - Assessment: History of frequent UTIs   - Plan: Monitor for symptoms. Consider cranberry supplements for prevention, constipation management, and increased water intake  - Counseling: Discussed relationship between kidney stones and UTIs. Stones can harbor bacteria leading to recurrent infections.    3. Bladder overactivity (N32.81)    - Assessment: Symptoms consistent with bladder overactivity.  - Plan: Recommended transcutaneous tibial nerve stimulation (TENS) therapy. Provided information on standard TENS unit (approximately $20 on [employer]) and alternative Zida control sock.  - Counseling: Explained mechanism of sacral neural modulation via tibial nerve stimulation to help retrain nerve signaling and calm bladder activity.    Plan    Problem List Items Addressed This Visit    None  Visit Diagnoses         Kidney stone        Relevant Medications    Potassium Citrate 15 MEQ (1620 MG) Tab CR    Other Relevant Orders    US-RENAL        ORDERS:    Potassium citrate 30 mg twice daily  Renal ultrasound in 6 weeks    FOLLOW  UP:    Return in 6 weeks to review ultrasound results and discuss 24-hour urine collection.    SHORT SUMMARY:    Status post complete stone clearance with ureteroscopy and laser lithotripsy now starting potassium citrate for prevention. Will follow up in 6 weeks with renal ultrasound to assess kidney status and discuss 24-hour urine collection.

## 2025-05-12 NOTE — PATIENT INSTRUCTIONS
Transcutaneous Tibial Nerve Stimulation     What is Transcutaneous Tibial Nerve Stimulation (TTNS)?     Transcutaneous Tibial Nerve Stimulation (TTNS) is a non-invasive therapy used to treat various medical conditions, including overactive bladder, urinary urgency, and some types of chronic pain. It involves the use of a TENS (Transcutaneous Electrical Nerve Stimulation) machine to stimulate the tibial nerve, which can help alleviate symptoms and improve muscle function.     Before You Begin:     Before starting TTNS therapy with a TENS machine, please ensure the following:     Consultation: You should have discussed this treatment with your healthcare provider, who will determine if TTNS is suitable for your condition.   TENS Machine: Ensure you have a TENS machine specifically designed for TTNS. Your healthcare provider or a medical supply store can help you acquire the appropriate device. We recommend the TENS Innotas0 Digital TENS Unit with accessories (can be ordered from [employer]) or a similar device.   Clean Hands: Always wash your hands thoroughly before handling the TENS machine or its electrodes.   Using Your TENS Machine for TTNS:     Follow these steps to use your TENS machine for TTNS:     Prepare the TENS Machine:     a. Make sure the TENS machine is turned off before attaching electrodes.   b. Ensure the device is clean and in good working condition.     Electrode Placement:     a. Your healthcare provider will guide you on the precise placement of the electrodes. Typically, the first lead is placed between the medial malleolus (ankle bone on the inside of your leg) and your heel, and the second pad is placed 2 pad widths up behind your tibia (place directly above first pad). See diagram below.   b. Ensure your skin is clean and dry before attaching the electrodes.   c. Connect the electrodes to the TENS machine following the 's instructions.         Turn On the TENS Machine:     a. Start with  the TENS machine turned off.   b. Gradually increase the intensity to a comfortable level, as directed by your healthcare provider.  Pulse width setting of 200 ls, intensity of 22-28 mA, and a frequency of 10 Hz. You should feel a gentle, tingling sensation but no discomfort or pain.     Treatment Duration:     a. The duration and frequency of TTNS recommended is 30 minutes, once a week for 12 weeks. You can then transition to a maintenance schedule of once a month for 30 minutes. You can perform this more often if needed (as frequently as daily).     During Treatment:     a. Remain still and relaxed during the TTNS session.   b. You may read, watch TV, or engage in a quiet activity while using the TENS machine.     After Treatment:     a. Turn off the TENS machine when the session is complete.   b. Carefully remove the electrodes and clean the skin if needed.   c. Store the TENS machine and electrodes in a safe, dry place.     Important Considerations:     Safety: Do not use your TENS machine while bathing or showering.   Skin Sensitivity: If you experience skin irritation or discomfort, discontinue use and contact your healthcare provider.   Medication: Continue any medications as prescribed by your healthcare provider unless instructed otherwise.   Maintenance: Regularly check your TENS machine and electrodes for wear and tear. Replace them as needed.   Follow-Up: Keep all scheduled follow-up appointments with your healthcare provider to assess your progress and make any necessary adjustments to your treatment plan.     If you have any questions or concerns about your TTNS therapy or experience any unusual symptoms, contact your healthcare provider promptly.

## 2025-05-13 ASSESSMENT — ENCOUNTER SYMPTOMS
NAUSEA: 0
CHILLS: 0
FEVER: 0
VOMITING: 0
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
## 2025-05-15 ENCOUNTER — PHARMACY VISIT (OUTPATIENT)
Dept: PHARMACY | Facility: MEDICAL CENTER | Age: 20
End: 2025-05-15
Payer: COMMERCIAL

## 2025-05-15 LAB
APPEARANCE STONE: NORMAL
COMPN STONE: NORMAL
SPECIMEN WT: 39 MG

## 2025-05-21 ENCOUNTER — APPOINTMENT (OUTPATIENT)
Dept: BEHAVIORAL HEALTH | Facility: CLINIC | Age: 20
End: 2025-05-21
Payer: COMMERCIAL

## 2025-05-21 PROCEDURE — RXMED WILLOW AMBULATORY MEDICATION CHARGE: Performed by: PHYSICIAN ASSISTANT

## 2025-05-21 RX ORDER — SERTRALINE HYDROCHLORIDE 100 MG/1
100 TABLET, FILM COATED ORAL DAILY
Qty: 90 TABLET | Refills: 3 | Status: SHIPPED | OUTPATIENT
Start: 2025-05-21

## 2025-05-21 RX ORDER — SERTRALINE HYDROCHLORIDE 25 MG/1
25 TABLET, FILM COATED ORAL DAILY
Qty: 90 TABLET | Refills: 3 | Status: SHIPPED | OUTPATIENT
Start: 2025-05-21

## 2025-05-24 ENCOUNTER — APPOINTMENT (OUTPATIENT)
Dept: URGENT CARE | Facility: PHYSICIAN GROUP | Age: 20
End: 2025-05-24
Payer: COMMERCIAL

## 2025-05-24 ENCOUNTER — PHARMACY VISIT (OUTPATIENT)
Dept: PHARMACY | Facility: MEDICAL CENTER | Age: 20
End: 2025-05-24
Payer: COMMERCIAL

## 2025-06-02 ENCOUNTER — HOSPITAL ENCOUNTER (OUTPATIENT)
Facility: MEDICAL CENTER | Age: 20
End: 2025-06-02
Attending: OBSTETRICS & GYNECOLOGY
Payer: COMMERCIAL

## 2025-06-02 PROCEDURE — 87591 N.GONORRHOEAE DNA AMP PROB: CPT

## 2025-06-02 PROCEDURE — 87661 TRICHOMONAS VAGINALIS AMPLIF: CPT

## 2025-06-02 PROCEDURE — 88142 CYTOPATH C/V THIN LAYER: CPT

## 2025-06-02 PROCEDURE — 87491 CHLMYD TRACH DNA AMP PROBE: CPT

## 2025-06-04 LAB
C TRACH DNA GENITAL QL NAA+PROBE: NEGATIVE
N GONORRHOEA DNA GENITAL QL NAA+PROBE: NEGATIVE
SPECIMEN SOURCE: NORMAL

## 2025-06-05 RX ORDER — CEFDINIR 300 MG/1
300 CAPSULE ORAL 2 TIMES DAILY
Qty: 10 CAPSULE | Refills: 0 | Status: SHIPPED | OUTPATIENT
Start: 2025-06-05 | End: 2025-06-10

## 2025-06-05 RX ORDER — FLUCONAZOLE 150 MG/1
150 TABLET ORAL ONCE
Qty: 1 TABLET | Refills: 0 | Status: SHIPPED | OUTPATIENT
Start: 2025-06-05 | End: 2025-06-05

## 2025-06-06 LAB
SPEC CONTAINER SPEC: NORMAL
SPECIMEN SOURCE: NORMAL
T VAGINALIS RRNA SPEC QL NAA+PROBE: NEGATIVE

## 2025-06-09 LAB — THINPREP PAP, CYTOLOGY NL11781: NORMAL
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
## 2025-06-16 ENCOUNTER — APPOINTMENT (OUTPATIENT)
Dept: BEHAVIORAL HEALTH | Facility: CLINIC | Age: 20
End: 2025-06-16
Payer: COMMERCIAL
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
## 2025-06-18 ENCOUNTER — OFFICE VISIT (OUTPATIENT)
Dept: BEHAVIORAL HEALTH | Facility: CLINIC | Age: 20
End: 2025-06-18
Payer: COMMERCIAL

## 2025-06-18 ENCOUNTER — PHARMACY VISIT (OUTPATIENT)
Dept: PHARMACY | Facility: MEDICAL CENTER | Age: 20
End: 2025-06-18
Payer: COMMERCIAL

## 2025-06-18 VITALS
HEART RATE: 65 BPM | BODY MASS INDEX: 20.59 KG/M2 | HEIGHT: 63 IN | OXYGEN SATURATION: 97 % | DIASTOLIC BLOOD PRESSURE: 62 MMHG | WEIGHT: 116.2 LBS | SYSTOLIC BLOOD PRESSURE: 106 MMHG

## 2025-06-18 DIAGNOSIS — F32.5 MAJOR DEPRESSIVE DISORDER WITH SINGLE EPISODE, IN FULL REMISSION (HCC): ICD-10-CM

## 2025-06-18 DIAGNOSIS — G47.00 INSOMNIA, UNSPECIFIED TYPE: ICD-10-CM

## 2025-06-18 DIAGNOSIS — F41.1 GAD (GENERALIZED ANXIETY DISORDER): Primary | ICD-10-CM

## 2025-06-18 DIAGNOSIS — F43.9 TRAUMA AND STRESSOR-RELATED DISORDER: ICD-10-CM

## 2025-06-18 PROCEDURE — 3078F DIAST BP <80 MM HG: CPT | Performed by: STUDENT IN AN ORGANIZED HEALTH CARE EDUCATION/TRAINING PROGRAM

## 2025-06-18 PROCEDURE — RXMED WILLOW AMBULATORY MEDICATION CHARGE: Performed by: STUDENT IN AN ORGANIZED HEALTH CARE EDUCATION/TRAINING PROGRAM

## 2025-06-18 PROCEDURE — 90792 PSYCH DIAG EVAL W/MED SRVCS: CPT | Performed by: STUDENT IN AN ORGANIZED HEALTH CARE EDUCATION/TRAINING PROGRAM

## 2025-06-18 PROCEDURE — 3074F SYST BP LT 130 MM HG: CPT | Performed by: STUDENT IN AN ORGANIZED HEALTH CARE EDUCATION/TRAINING PROGRAM

## 2025-06-18 RX ORDER — DOXEPIN 3 MG/1
3 TABLET, FILM COATED ORAL
Qty: 30 TABLET | Refills: 1 | Status: SHIPPED | OUTPATIENT
Start: 2025-06-18

## 2025-06-18 RX ORDER — SERTRALINE HYDROCHLORIDE 100 MG/1
200 TABLET, FILM COATED ORAL DAILY
Qty: 60 TABLET | Refills: 1 | Status: SHIPPED | OUTPATIENT
Start: 2025-06-18

## 2025-06-18 RX ORDER — PROPRANOLOL HYDROCHLORIDE 10 MG/1
10 TABLET ORAL 3 TIMES DAILY PRN
Qty: 90 TABLET | Refills: 1 | Status: SHIPPED | OUTPATIENT
Start: 2025-06-18

## 2025-06-18 ASSESSMENT — FIBROSIS 4 INDEX: FIB4 SCORE: 0.42

## 2025-06-18 NOTE — Clinical Note
REFERRAL APPROVAL NOTICE         Sent on June 18, 2025                   Ivory Chávez  [address]  [place] [address]                   Dear Ms. Chávez,    After a careful review of the medical information and benefit coverage, [facility] has processed your referral. See below for additional details.    If applicable, you must be actively enrolled with your insurance for coverage of the authorized service. If you have any questions regarding your coverage, please contact your insurance directly.    REFERRAL INFORMATION   Referral #:  [id number]  Referred-To Department    Referred-By Provider:  Behavioral Health    Elvin Mccann M.D.   Behavioral Health Outpatient      [address]  [facility] [address]  [place] [id number] [phone] [address]  [place] [id number] [phone]    Referral Start Date:  06/18/2025  Referral End Date:   06/18/2026             SCHEDULING  If you do not already have an appointment, please call [phone] to make an appointment.     MORE INFORMATION  If you do not already have a [employer] account, sign up at: [email or web address]  You can access your medical information, make appointments, see lab results, billing information, and more.  If you have questions regarding this referral, please contact  the [facility] Referrals department at:             [phone]. Monday - Friday 8:00AM - 5:00PM.     Sincerely,    [facility]

## 2025-06-18 NOTE — PATIENT INSTRUCTIONS
- Increase Sertraline dose to 200mg once daily to optimize treatment of anxiety and panic attacks.     - Start trial of Doxepin 3mg at bedtime to help with insomnia    - Continue with Propanolol 10mg up to three times a day as needed for acute anxiety     - Consider establishing with outpatient therapy for additional support in processing stressors and working on coping skills and strategies as well  - I have placed a referral for you to do so at this clinic    - Follow up with this clinic in 4-6 weeks for reassessment or contact us sooner with any new questions or concerns! If you have any acute concerns about your safety I recommend reaching out to emergency services if needed for support.      ---    [place]    [facility]  Address: [address] [place] [address]  Phone: [phone]  Notes: [facility] is a consortium of private practitioners who advertise together, and are in business separately as the following: Sharmin Rodriguez, Marriage and Family Therapist, Licensed Clinical Alcohol & Drug Counselor; Rasheed Dejesus [facility] Practitioner; Argenis Iqbal, Occupational Therapist and Reiki Master; Nayla Jimenez, Marriage and Family Therapist  [facility]  Address: [address] [place] [address]  Phone: [phone]  Notes: ADHD skills building, ACT Therapy, CBT, EMDR, Trauma focused therapy, Play based therapy, Family therapy  [facility]  Address: [address], [place] [address]  Phone: [phone]  Notes: Depression, Anxiety and stress management, marriage and family, DBT, EMDR  [facility]  Address: [address] [address]  Phone: [phone]  Notes: wellness, mindfulness, self-care, LGBTQ+, goal setting and achievement   [facility]  Address: [address], [place] [address]  Phone: [phone]  Notes: Cognitive Behavioral Therapy (CBT), Dialectical Behavioral Therapy (DBT),  Emotionally Focused Therapy (EFT), Vicente Couples Therapy, Positive Discipline and Child Education, Play Therapy  [facility]  Address: [address] [facility] [address]  Phone: [phone]  Notes: substance abuse, domestic violence, anger management, marriage and family counseling, mindfulness as well as Substance Abuse and Anger Management/Domestic Violence Groups              [facility]: Sharmin Hobbs Psy.D, SERGIO ArguelloW, Anila Blanc, PhD  Address: [address] [place] [address]  Phone: [phone]  Notes: sleep disorders and health related concerns such as chronic pain, depression and anxiety  Dusty Yang, Ph.D at [facility]  Address: [address], [address], [place] [address]  Phone: [phone]  Notes: sport psychology, resilience, motivation, self-talk, confidence  [facility]  Address: [address], [address], [facility], [address]  Phone: [phone]  Notes: Certified [facility] ([facility]), offering peer services, case management, crisis intervention, Basic Skills Training, and Psycho-Social Rehabilitation; provides MAT for adolescent and adult opioid users, family therapy   [facility]  Address: [address] [place] [address]  Phone: [phone]  Notes: Many group class options, Domestic Violence, Anger Management, Faroese speaking, Substance Abuse, Peaceful families parenting, Family therapy, Sliding scale fees  [facility]  Address: [address] [place] [address]  Phone: [phone]   Notes: EMDR, Faroese speaking, Psychedelic assisted therapy, couples and family counseling  [facility]  Address: [address] [place] [address]  Phone: [phone]  Notes: Testing: Cognitive evaluations, Learning disability evaluations, ADHD evaluations, Pre-employment evaluations, Fitness for duty  evaluations, Bariatric surgery evaluations, Pain procedure evaluations  [facility]  Address: [address], [place] [address]  Phone: [phone]  Notes: Borderline Personality Disorder, EMDR, Grief Counseling, Couples/family/parenting

## 2025-06-21 ENCOUNTER — PHARMACY VISIT (OUTPATIENT)
Dept: PHARMACY | Facility: MEDICAL CENTER | Age: 20
End: 2025-06-21
Payer: COMMERCIAL

## 2025-06-26 PROCEDURE — RXMED WILLOW AMBULATORY MEDICATION CHARGE: Performed by: STUDENT IN AN ORGANIZED HEALTH CARE EDUCATION/TRAINING PROGRAM

## 2025-06-27 ENCOUNTER — HOSPITAL ENCOUNTER (OUTPATIENT)
Dept: RADIOLOGY | Facility: MEDICAL CENTER | Age: 20
End: 2025-06-27
Attending: STUDENT IN AN ORGANIZED HEALTH CARE EDUCATION/TRAINING PROGRAM
Payer: COMMERCIAL

## 2025-06-27 DIAGNOSIS — N20.0 KIDNEY STONE: ICD-10-CM

## 2025-06-27 PROCEDURE — 76775 US EXAM ABDO BACK WALL LIM: CPT

## 2025-06-30 ENCOUNTER — APPOINTMENT (OUTPATIENT)
Dept: UROLOGY | Facility: MEDICAL CENTER | Age: 20
End: 2025-06-30
Payer: COMMERCIAL

## 2025-07-07 ENCOUNTER — PHARMACY VISIT (OUTPATIENT)
Dept: PHARMACY | Facility: MEDICAL CENTER | Age: 20
End: 2025-07-07
Payer: COMMERCIAL

## 2025-07-11 ENCOUNTER — APPOINTMENT (OUTPATIENT)
Dept: MEDICAL GROUP | Facility: PHYSICIAN GROUP | Age: 20
End: 2025-07-11
Payer: COMMERCIAL

## 2025-07-14 ENCOUNTER — PHARMACY VISIT (OUTPATIENT)
Dept: PHARMACY | Facility: MEDICAL CENTER | Age: 20
End: 2025-07-14
Payer: COMMERCIAL

## 2025-07-14 PROCEDURE — RXMED WILLOW AMBULATORY MEDICATION CHARGE: Performed by: STUDENT IN AN ORGANIZED HEALTH CARE EDUCATION/TRAINING PROGRAM

## 2025-07-18 ENCOUNTER — APPOINTMENT (OUTPATIENT)
Dept: UROLOGY | Facility: MEDICAL CENTER | Age: 20
End: 2025-07-18
Payer: COMMERCIAL

## 2025-07-18 ENCOUNTER — PATIENT MESSAGE (OUTPATIENT)
Dept: UROLOGY | Facility: MEDICAL CENTER | Age: 20
End: 2025-07-18

## 2025-07-18 DIAGNOSIS — N32.81 OVERACTIVE BLADDER: ICD-10-CM

## 2025-07-18 DIAGNOSIS — N20.0 KIDNEY STONE: Primary | ICD-10-CM

## 2025-07-18 LAB
POC POST-VOID: 75 ML
POC PRE-VOID: 79 ML

## 2025-07-18 PROCEDURE — 99214 OFFICE O/P EST MOD 30 MIN: CPT | Performed by: STUDENT IN AN ORGANIZED HEALTH CARE EDUCATION/TRAINING PROGRAM

## 2025-07-18 PROCEDURE — 51798 US URINE CAPACITY MEASURE: CPT | Performed by: STUDENT IN AN ORGANIZED HEALTH CARE EDUCATION/TRAINING PROGRAM

## 2025-07-18 PROCEDURE — RXMED WILLOW AMBULATORY MEDICATION CHARGE: Performed by: STUDENT IN AN ORGANIZED HEALTH CARE EDUCATION/TRAINING PROGRAM

## 2025-07-18 RX ORDER — POTASSIUM CITRATE 1080 MG/1
10 TABLET, EXTENDED RELEASE ORAL 3 TIMES DAILY
Qty: 270 TABLET | Refills: 3 | Status: SHIPPED | OUTPATIENT
Start: 2025-07-18

## 2025-07-19 NOTE — PROGRESS NOTES
Subjective  CHIEF COMPLAINT:    Patient presents to the office today to discuss:    1. Follow-up for bilateral nephrolithiasis, status post left ureteroscopy.  2. Ongoing urinary frequency and retention.  3. Medication side effects.    PAST UROLOGICAL HISTORY:    This woman has a history of bilateral nephrolithiasis. She was previously seen in March 2025 for this condition, at which time a CT scan identified stones, including a significant one on the left and a smaller one on the right. She subsequently underwent a left ureteroscopy with laser lithotripsy. Post-operatively, she was started on potassium citrate for stone prevention. She has a history of urinary symptoms including frequency and incontinence.    HPI TODAY 07/18/2025:    - Reports no pain recently, but continues to experience urinary issues, mainly feelings of urinary retention. She notes less urge incontinence but when the urge occurs, it is intense. Incontinence is now infrequent, mainly with sneezing. The new issue is retention, where she feels she is not voiding a volume consistent with her fluid intake. She has been on a low-protein diet every other day, which she feels is helping her general well-being.  - She is experiencing GI side effects from the potassium citrate.  - A recent ultrasound showed possible calcifications on the left. The prior CT from March 2025 showed parenchymal calcifications which may be confusing the ultrasound findings. There was a small stone in the lower pole on the left and a small punctate calcification on the right, which remains stable in size on the recent ultrasound. I personally reviewed and independently interpreted the scan and concur with findings. It could be residual stone dust or calcification of the lining. She has no nocturia.   She reports episodes of what feels like a UTI, but urine tests have been negative for bacteria.        Family History   Problem Relation Age of Onset    No Known Problems Mother      No Known Problems Father     No Known Problems Sister     No Known Problems Sister     No Known Problems Brother     No Known Problems Maternal Grandmother     No Known Problems Maternal Grandfather     No Known Problems Paternal Grandmother     No Known Problems Paternal Grandfather     Cancer Neg Hx     Diabetes Neg Hx     Heart Disease Neg Hx     Hyperlipidemia Neg Hx     Hypertension Neg Hx     Stroke Neg Hx        Social History     Socioeconomic History    Marital status: Single     Spouse name: Not on file    Number of children: Not on file    Years of education: Not on file    Highest education level: Associate degree: occupational, technical, or vocational program   Occupational History    Not on file   Tobacco Use    Smoking status: Never    Smokeless tobacco: Never   Vaping Use    Vaping status: Former    Substances: Nicotine    Devices: Disposable   Substance and Sexual Activity    Alcohol use: Yes     Comment: socially, 1-2 x per month, 3-4 drinks each time, mostly wine    Drug use: Never    Sexual activity: Yes     Partners: Male     Birth control/protection: Injection   Other Topics Concern    Behavioral problems No    Interpersonal relationships No    Sad or not enjoying activities No    Suicidal thoughts No    Poor school performance No    Reading difficulties No    Speech difficulties No    Writing difficulties No    Inadequate sleep No    Excessive TV viewing No    Excessive video game use No    Inadequate exercise No    Sports related No    Poor diet No    Family concerns for drug/alcohol abuse No    Poor oral hygiene No    Bike safety No    Family concerns vehicle safety No   Social History Narrative    Not on file     Social Drivers of Health     Financial Resource Strain: Low Risk  (7/22/2024)    Overall Financial Resource Strain (CARDIA)     Difficulty of Paying Living Expenses: Not very hard   Food Insecurity: Food Insecurity Present (7/22/2024)    Hunger Vital Sign     Worried About Running  Out of Food in the Last Year: Sometimes true     Ran Out of Food in the Last Year: Never true   Transportation Needs: No Transportation Needs (7/22/2024)    PRAPARE - Transportation     Lack of Transportation (Medical): No     Lack of Transportation (Non-Medical): No   Physical Activity: Sufficiently Active (7/22/2024)    Exercise Vital Sign     Days of Exercise per Week: 5 days     Minutes of Exercise per Session: 60 min   Stress: Stress Concern Present (7/22/2024)    [place] [employer] - Occupational Stress Questionnaire     Feeling of Stress : Rather much   Social Connections: Socially Isolated (7/22/2024)    Social Connection and Isolation Panel [NHANES]     Frequency of Communication with Friends and Family: More than three times a week     Frequency of Social Gatherings with Friends and Family: More than three times a week     Attends Taoist Services: Never     Active Member of Clubs or Organizations: No     Attends Club or Organization Meetings: Never     Marital Status: Never    Intimate Partner Violence: Not At Risk (7/23/2024)    Humiliation, Afraid, Rape, and Kick questionnaire     Fear of Current or Ex-Partner: No     Emotionally Abused: No     Physically Abused: No     Sexually Abused: No   Housing Stability: High Risk (7/22/2024)    Housing Stability Vital Sign     Unable to Pay for Housing in the Last Year: Yes     Number of Places Lived in the Last Year: 1     Unstable Housing in the Last Year: No       Past Surgical History[1]    Past Medical History[2]    Current Medications[3]    Allergies[4]    Objective  There were no vitals taken for this visit.  Physical Exam  Constitutional:       Appearance: Normal appearance.   HENT:      Head: Normocephalic and atraumatic.   Pulmonary:      Effort: Pulmonary effort is normal.   Skin:     General: Skin is warm and dry.   Neurological:      General: No focal deficit present.      Mental Status: She is alert.   Psychiatric:          Mood and Affect: Mood normal.         Behavior: Behavior normal.         Labs:   [facility]   Lab Results   Component Value Date/Time    SODIUM 139 05/05/2025 0906    POTASSIUM 4.1 05/05/2025 0906    CHLORIDE 103 05/05/2025 0906    CO2 26 05/05/2025 0906    GLUCOSE 88 05/05/2025 0906    BUN 19 05/05/2025 0906    CREATININE 1.01 05/05/2025 0906    CALCIUM 9.6 05/05/2025 0906       Imaging:   US RENAL   US-RENAL 06/27/2025    Narrative  6/27/2025 1:08 PM    HISTORY/REASON FOR EXAM:  Kidney stone.    TECHNIQUE/EXAM DESCRIPTION:  Renal ultrasound.    COMPARISON:  Renal/bladder ultrasound, 3/6/2025. CT renal colic evaluation, 3/19/2025.    FINDINGS:    The right kidney measures 10.37 cm.  The right kidney appears normal in contour and parenchymal echotexture. The corticomedullary differentiation is preserved. The right renal collecting system is not dilated. No hydronephrosis. Nonobstructing stone in  the interpolar region of the right kidney measuring 3.6 mm in diameter.    The left kidney measures 10.88 cm. The left kidney appears normal in contour and parenchymal echotexture. The corticomedullary differentiation is preserved. The left renal collecting system is not dilated. No hydronephrosis. There are multiple stones in  the calyces of the left kidney, involving the upper pole, interpolar region and lower pole. No hydronephrosis.    The bladder is decompressed.    Impression  1.  Bilateral nonobstructing renal stones, stable in distribution when compared to prior CT renal colic evaluation on 3/19/2025.  2.  No hydronephrosis.  3.  The bladder is decompressed.      Assessment    ASSESSMENT AND PLAN:    This is a young woman with a history of bilateral nephrolithiasis, status post left ureteroscopy, with ongoing complex voiding dysfunction characterized by a shift from overactive bladder symptoms to feelings of urinary retention.    1. Bilateral Nephrolithiasis (N20.2)  - Assessment: Currently pain-free. Ultrasound  "findings are equivocal for new left-sided stone formation post-operatively. The findings may represent parenchymal calcifications (Bucky's plaques) or residual debris. Given she is young and asymptomatic, repeat imaging with a non-contrast CT is not immediately warranted.  - Plan: We will monitor with a repeat renal ultrasound in 6-12 months. If she develops pain, we will proceed with a CT scan sooner.  - Counseling: Discussed that the ultrasound findings are not definitive and may not represent true stones. Discussed the risks and benefits of immediate CT versus surveillance.    2. Mixed Urinary Incontinence and Urinary Retention (N39.46, R33.9)  - Assessment: Symptoms have shifted from primarily frequency/urgency/incontinence to now include feelings of urinary retention. She has tried pelvic floor physical therapy in the past without significant benefit. She is using a TENS unit which has helped with frequency but not retention. Her current symptoms are concerning for idiopathic urinary retention.  - Plan: A pre-void and post-void bladder scan was performed in the office today to objectively assess emptying. PVR today was 75  - Counseling: Discussed sacral neuromodulation (\"bladder pacemaker\") as a durable, catheter-free option for her symptoms if they persist. Explained it is a two-stage procedure involving a trial period. Discussed risks, benefits, and the low-intensity nature of the procedure. It should not affect her reproductive organs or fertility. She can opt for general anesthesia or sedation for the procedure. Also discussed options that do not involve catheters, noting that retention in the hundreds of cc's can be safely monitored if kidney function is normal.    3. 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  - Plan: Will change her prescription to potassium citrate 20 mEq tablets, to " be  three times a day to see if smaller, more frequent dosing alleviates GI upset.  - Counseling: Explained that splitting the dose may improve GI tolerance.  Plan    Problem List Items Addressed This Visit    None  Visit Diagnoses         Kidney stone    -  Primary    Relevant Medications    potassium citrate SR (UROCIT-K SR) 10 MEQ (1080 MG) Tab CR    Other Relevant Orders    CALCULI RISK ASSESSMENT PANEL, URINE    POCT Bladder Scan (Completed)    Basic Metabolic Panel        ORDERS:    1. Potassium Citrate ER 20 mEq tablets, 1 tab PO TID.  2. 24-hour urine collection to assess metabolic stone risk factors now that she is on treatment.  3. Renal ultrasound in 6 months.    FOLLOW UP:    Follow up in 6-12 months with repeat renal ultrasound prior to the visit, or sooner if symptoms of pain or significant voiding dysfunction worsen.    SHORT SUMMARY:    This young woman presented for follow-up of bilateral nephrolithiasis and voiding dysfunction. She is now experiencing urinary retention. Plan is to adjust potassium citrate for GI side effects, obtain a 24-hour urine study, and monitor kidneys with an ultrasound in 6-12 months. We discussed sacral neuromodulation as a potential future therapy for her urinary symptoms       [1]   Past Surgical History:  Procedure Laterality Date    [place] CYSTOSCOPY,INSERT URETERAL STENT N/A 5/9/2025    Procedure: CYSTOSCOPY WITH LEFT URETRAL STENT INSERTION;  Surgeon: Antwan Dwyer M.D.;  Location: [facility];  Service: Urology    [place] CYSTO/URETERO/PYELOSCOPY, DX Left 5/9/2025    Procedure: URETEROSCOPY;  Surgeon: Antwan Dwyer M.D.;  Location: [facility];  Service: Urology    LASERTRIPSY Left 5/9/2025    Procedure: LITHOTRIPSY, USING LASER;  Surgeon: Antwan Dwyer M.D.;  Location: [facility];  Service: Urology    [place] CYSTO/URETERO/PYELOSCOPY, DX Left 4/25/2025    Procedure: URETEROSCOPY;  Surgeon: Antwan Dwyer M.D.;  Location: [facility];  Service:  Urology    CYSTOSCOPY WITH URETERAL STENT INSERTION OR REMOVAL  4/25/2025    Procedure: CYSTOSCOPY WITH LEFT URETEROSCOPY, LASER LITHOTRIPSY, STENT PLACEMENT;  Surgeon: Antwan Dwyer M.D.;  Location: [facility];  Service: Urology    LASERTRIPSY Left 4/25/2025    Procedure: LITHOTRIPSY, USING LASER;  Surgeon: Antwan Dwyer M.D.;  Location: [facility];  Service: Urology    [place] CYSTOSCOPY,INSERT URETERAL STENT Right 7/22/2024    Procedure: CYSTOSCOPY, WITH URETERAL STENT INSERTION;  Surgeon: Darren Cornejo M.D.;  Location: [facility];  Service: Urology    CYSTOSCOPY WITH URETERAL STENT INSERTION OR REMOVAL Bilateral 8/27/2023    Procedure: CYSTOSCOPY, WITH URETERAL STENT INSERTION OR REMOVAL;  Surgeon: Danny Harrison M.D.;  Location: [facility];  Service: Urology    LASERTRIPSY Bilateral 8/27/2023    Procedure: LITHOTRIPSY, USING LASER;  Surgeon: Danny Harrison M.D.;  Location: [facility];  Service: Urology    [place] CYSTOSCOPY,INSERT URETERAL STENT N/A 3/16/2023    Procedure: CYSTOSCOPY;  Surgeon: Nick Ramirez M.D.;  Location: [facility];  Service: Urology    [place] CYSTO/URETERO/PYELOSCOPY, DX Left 3/16/2023    Procedure: URETEROSCOPY;  Surgeon: Nick Ramirez M.D.;  Location: [facility];  Service: Urology    HI CYSTOSCOPY,INSERT URETERAL STENT Left 3/7/2022    Procedure: CYSTOSCOPY, WITH URETERAL STENT INSERTION;  Surgeon: Nick Ramirez M.D.;  Location: [facility];  Service: Urology    HI CYSTO/URETERO/PYELOSCOPY, DX Left 3/7/2022    Procedure: URETEROSCOPY;  Surgeon: Nick Ramirez M.D.;  Location: [facility];  Service: Urology    LASERTRIPSY Left 3/7/2022    Procedure: LITHOTRIPSY, USING LASER;  Surgeon: Nick Ramirez M.D.;  Location: [facility];  Service: Urology    HI CYSTOSCOPY,INSERT URETERAL STENT Left 2/24/2022    Procedure: CYSTOSCOPY, WITH URETERAL STENT INSERTION;  Surgeon: Baljeet Yung M.D.;   Location: [facility];  Service: Urology    OTHER ORTHOPEDIC SURGERY  2021    foot - bone shaved    [place] CYSTOSCOPY,INSERT URETERAL STENT Left 7/1/2020    Procedure: CYSTOSCOPY, WITH URETERAL STENT INSERTION;  Surgeon: Rafiq Gómez M.D.;  Location: [facility];  Service: Urology    [place] CYSTO/URETERO/PYELOSCOPY, DX Left 7/1/2020    Procedure: URETEROSCOPY;  Surgeon: Rafiq Gómez M.D.;  Location: [facility];  Service: Urology    LASERTRIPSY  7/1/2020    Procedure: LITHOTRIPSY, USING LASER;  Surgeon: Rafiq Gómez M.D.;  Location: [facility];  Service: Urology    AK CYSTOSCOPY,INSERT URETERAL STENT Left 8/19/2019    Procedure: CYSTOSCOPY, WITH URETERAL STENT INSERTION;  Surgeon: Rafiq Gómez M.D.;  Location: [facility];  Service: Urology    LASERTRIPSY Left 8/19/2019    Procedure: LITHOTRIPSY, USING LASER;  Surgeon: Rafiq Gómez M.D.;  Location: [facility];  Service: Urology    URETEROSCOPY Left 11/13/2018    Procedure: URETEROSCOPY;  Surgeon: Francisco Sherman M.D.;  Location: [facility];  Service: Urology    STENT PLACEMENT Left 11/13/2018    Procedure: STENT PLACEMENT;  Surgeon: Francisco Sherman M.D.;  Location: [facility];  Service: Urology    CYSTOSCOPY  11/13/2018    Procedure: CYSTOSCOPY;  Surgeon: Francisco Sherman M.D.;  Location: [facility];  Service: Urology    LITHOTRIPSY Left 11/13/2018    Procedure: LITHOTRIPSY;  Surgeon: Francisco Sherman M.D.;  Location: [facility];  Service: Urology    PERCUTANEOUS NEPHROSTOLITHOTOMY Left 11/5/2018    Procedure: PERCUTANEOUS NEPHROSTOLITHOTOMY (PCNL);  Surgeon: Danny Harrison M.D.;  Location: [facility];  Service: Urology    STENT REPLACEMENT Left 11/5/2018    Procedure: STENT REPLACEMENT;  Surgeon: Danny Harrison M.D.;  [facility];  Service: Urology    OTHER  2006    cyst  excision to buttock   [2]   Past Medical History:  Diagnosis Date    Allergy     latex    Anxiety     Chronic low blood pressure 04/09/2025    states is only symptomatic 1x/month, takes albuterol inhaler to raise BP    Cystinuria (HCC)     Cystinuria (HCC)     Heart burn 04/09/2025    takes omeprazole prn, hasn't needed in last year    Nondisplaced bimalleolar fracture of left lower leg, initial encounter for closed fracture 2007    left lower leg fracture, splinted, no surgery    Pneumonia 03/2024    PONV (postoperative nausea and vomiting)     Psychiatric problem     OCD, anxiety, bipolar    Renal disorder     Kidney stone     Urinary retention 04/09/2025    states has had off an on for 2 years    Urinary tract infection     pt states she frequent UTIs due to the kidney stones   [3]   Current Outpatient Medications   Medication Sig Dispense Refill    potassium citrate SR (UROCIT-K SR) 10 MEQ (1080 MG) Tab CR Take 1 Tablet by mouth 3 times a day. 270 Tablet 3    Doxepin HCl 3 MG Tab Take 1 tablet (3 mg) by mouth at bedtime. 30 Tablet 1    sertraline (ZOLOFT) 100 MG Tab Take 2 Tablets by mouth every day. 60 Tablet 1    propranolol (INDERAL) 10 MG Tab Take 1 Tablet by mouth 3 times a day as needed (anxiety). 90 Tablet 1    Menthol-Methyl Salicylate (TIGER BALM LINIMENT EX) Apply 1 Application topically as needed (pain).      phenazopyridine (PYRIDIUM) 200 MG Tab Take 1 Tablet by mouth 3 times a day as needed for Mild Pain or Moderate Pain. 6 Tablet 0    ketorolac (TORADOL) 10 MG Tab Take 1 Tablet by mouth every four hours as needed for Moderate Pain or Severe Pain. Do not exceed more than 4 times daily for 5 days 30 Tablet 0    ondansetron (ZOFRAN ODT) 4 MG TABLET DISPERSIBLE Take 1 Tablet by mouth every 6 hours as needed for Nausea/Vomiting. 10 Tablet 0    albuterol 108 (90 Base) MCG/ACT Aero Soln inhalation aerosol Inhale 2 Puffs every 6 hours as needed for Shortness of Breath. 8.5 g 0    cetirizine (ZYRTEC) 10 MG  Tab Take 10 mg by mouth every day.      EPINEPHrine (EPIPEN) 0.3 MG/0.3ML Solution Auto-injector solution for injection INJECT THE CONTENTS OF THE EPIPEN INTO THE THIGH, HOLD FOR 3 SECONDS AND RELEASE FROM THIGH. 1 Each 5     No current facility-administered medications for this visit.   [4]   Allergies  Allergen Reactions    Latex Anaphylaxis

## 2025-07-23 ENCOUNTER — PHARMACY VISIT (OUTPATIENT)
Dept: PHARMACY | Facility: MEDICAL CENTER | Age: 20
End: 2025-07-23
Payer: COMMERCIAL

## 2025-07-23 PROCEDURE — RXOTC WILLOW AMBULATORY OTC CHARGE: Performed by: PHARMACIST

## 2025-08-03 ENCOUNTER — HOSPITAL ENCOUNTER (EMERGENCY)
Facility: MEDICAL CENTER | Age: 20
End: 2025-08-03
Attending: EMERGENCY MEDICINE

## 2025-08-03 ENCOUNTER — APPOINTMENT (OUTPATIENT)
Dept: RADIOLOGY | Facility: MEDICAL CENTER | Age: 20
End: 2025-08-03
Attending: EMERGENCY MEDICINE

## 2025-08-03 VITALS
TEMPERATURE: 96.9 F | HEART RATE: 55 BPM | SYSTOLIC BLOOD PRESSURE: 97 MMHG | RESPIRATION RATE: 18 BRPM | DIASTOLIC BLOOD PRESSURE: 57 MMHG | WEIGHT: 118.61 LBS | BODY MASS INDEX: 21.02 KG/M2 | OXYGEN SATURATION: 95 % | HEIGHT: 63 IN

## 2025-08-03 DIAGNOSIS — R33.9 URINARY RETENTION: Primary | ICD-10-CM

## 2025-08-03 LAB
ANION GAP SERPL CALC-SCNC: 13 MMOL/L (ref 7–16)
APPEARANCE UR: CLEAR
BACTERIA #/AREA URNS HPF: ABNORMAL /HPF
BASOPHILS # BLD AUTO: 0.8 % (ref 0–1.8)
BASOPHILS # BLD: 0.07 K/UL (ref 0–0.12)
BILIRUB UR QL STRIP.AUTO: NEGATIVE
BUN SERPL-MCNC: 12 MG/DL (ref 8–22)
CALCIUM SERPL-MCNC: 9 MG/DL (ref 8.5–10.5)
CASTS URNS QL MICRO: ABNORMAL /LPF (ref 0–2)
CHLORIDE SERPL-SCNC: 104 MMOL/L (ref 96–112)
CO2 SERPL-SCNC: 23 MMOL/L (ref 20–33)
COLOR UR: YELLOW
CREAT SERPL-MCNC: 0.88 MG/DL (ref 0.5–1.4)
EOSINOPHIL # BLD AUTO: 0.19 K/UL (ref 0–0.51)
EOSINOPHIL NFR BLD: 2.1 % (ref 0–6.9)
EPITHELIAL CELLS 1715: ABNORMAL /HPF (ref 0–5)
ERYTHROCYTE [DISTWIDTH] IN BLOOD BY AUTOMATED COUNT: 44.4 FL (ref 35.9–50)
GFR SERPLBLD CREATININE-BSD FMLA CKD-EPI: 95 ML/MIN/1.73 M 2
GLUCOSE SERPL-MCNC: 84 MG/DL (ref 65–99)
GLUCOSE UR STRIP.AUTO-MCNC: NEGATIVE MG/DL
HCG SERPL QL: NEGATIVE
HCT VFR BLD AUTO: 41.9 % (ref 37–47)
HGB BLD-MCNC: 14.5 G/DL (ref 12–16)
IMM GRANULOCYTES # BLD AUTO: 0.04 K/UL (ref 0–0.11)
IMM GRANULOCYTES NFR BLD AUTO: 0.4 % (ref 0–0.9)
KETONES UR STRIP.AUTO-MCNC: NEGATIVE MG/DL
LEUKOCYTE ESTERASE UR QL STRIP.AUTO: ABNORMAL
LYMPHOCYTES # BLD AUTO: 2.62 K/UL (ref 1–4.8)
LYMPHOCYTES NFR BLD: 28.4 % (ref 22–41)
MCH RBC QN AUTO: 33.3 PG (ref 27–33)
MCHC RBC AUTO-ENTMCNC: 34.6 G/DL (ref 32.2–35.5)
MCV RBC AUTO: 96.1 FL (ref 81.4–97.8)
MICRO URNS: ABNORMAL
MONOCYTES # BLD AUTO: 0.72 K/UL (ref 0–0.85)
MONOCYTES NFR BLD AUTO: 7.8 % (ref 0–13.4)
NEUTROPHILS # BLD AUTO: 5.59 K/UL (ref 1.82–7.42)
NEUTROPHILS NFR BLD: 60.5 % (ref 44–72)
NITRITE UR QL STRIP.AUTO: NEGATIVE
NRBC # BLD AUTO: 0 K/UL
NRBC BLD-RTO: 0 /100 WBC (ref 0–0.2)
PH UR STRIP.AUTO: 7 [PH] (ref 5–8)
PLATELET # BLD AUTO: 235 K/UL (ref 164–446)
PMV BLD AUTO: 8.5 FL (ref 9–12.9)
POTASSIUM SERPL-SCNC: 4 MMOL/L (ref 3.6–5.5)
PROT UR QL STRIP: NEGATIVE MG/DL
RBC # BLD AUTO: 4.36 M/UL (ref 4.2–5.4)
RBC # URNS HPF: ABNORMAL /HPF
RBC UR QL AUTO: NEGATIVE
SODIUM SERPL-SCNC: 140 MMOL/L (ref 135–145)
SP GR UR STRIP.AUTO: 1.01
UROBILINOGEN UR STRIP.AUTO-MCNC: 0.2 EU/DL
WBC # BLD AUTO: 9.2 K/UL (ref 4.8–10.8)
WBC #/AREA URNS HPF: ABNORMAL /HPF

## 2025-08-03 PROCEDURE — 303105 HCHG CATHETER EXTRA

## 2025-08-03 PROCEDURE — 96376 TX/PRO/DX INJ SAME DRUG ADON: CPT

## 2025-08-03 PROCEDURE — 96375 TX/PRO/DX INJ NEW DRUG ADDON: CPT

## 2025-08-03 PROCEDURE — 87086 URINE CULTURE/COLONY COUNT: CPT

## 2025-08-03 PROCEDURE — 84703 CHORIONIC GONADOTROPIN ASSAY: CPT

## 2025-08-03 PROCEDURE — 80048 BASIC METABOLIC PNL TOTAL CA: CPT

## 2025-08-03 PROCEDURE — 81001 URINALYSIS AUTO W/SCOPE: CPT

## 2025-08-03 PROCEDURE — 99284 EMERGENCY DEPT VISIT MOD MDM: CPT

## 2025-08-03 PROCEDURE — 96374 THER/PROPH/DIAG INJ IV PUSH: CPT

## 2025-08-03 PROCEDURE — 85025 COMPLETE CBC W/AUTO DIFF WBC: CPT

## 2025-08-03 PROCEDURE — 36415 COLL VENOUS BLD VENIPUNCTURE: CPT

## 2025-08-03 PROCEDURE — 700111 HCHG RX REV CODE 636 W/ 250 OVERRIDE (IP): Mod: JZ | Performed by: EMERGENCY MEDICINE

## 2025-08-03 PROCEDURE — 51702 INSERT TEMP BLADDER CATH: CPT

## 2025-08-03 PROCEDURE — 76775 US EXAM ABDO BACK WALL LIM: CPT

## 2025-08-03 PROCEDURE — 700111 HCHG RX REV CODE 636 W/ 250 OVERRIDE (IP): Mod: JZ

## 2025-08-03 RX ORDER — HYDROMORPHONE HYDROCHLORIDE 1 MG/ML
1 INJECTION, SOLUTION INTRAMUSCULAR; INTRAVENOUS; SUBCUTANEOUS ONCE
Status: COMPLETED | OUTPATIENT
Start: 2025-08-03 | End: 2025-08-03

## 2025-08-03 RX ORDER — ONDANSETRON 2 MG/ML
4 INJECTION INTRAMUSCULAR; INTRAVENOUS ONCE
Status: COMPLETED | OUTPATIENT
Start: 2025-08-03 | End: 2025-08-03

## 2025-08-03 RX ORDER — KETOROLAC TROMETHAMINE 15 MG/ML
15 INJECTION, SOLUTION INTRAMUSCULAR; INTRAVENOUS ONCE
Status: COMPLETED | OUTPATIENT
Start: 2025-08-03 | End: 2025-08-03

## 2025-08-03 RX ORDER — METOCLOPRAMIDE HYDROCHLORIDE 5 MG/ML
5 INJECTION INTRAMUSCULAR; INTRAVENOUS ONCE
Status: COMPLETED | OUTPATIENT
Start: 2025-08-03 | End: 2025-08-03

## 2025-08-03 RX ADMIN — ONDANSETRON 4 MG: 2 INJECTION INTRAMUSCULAR; INTRAVENOUS at 03:01

## 2025-08-03 RX ADMIN — HYDROMORPHONE HYDROCHLORIDE 1 MG: 1 INJECTION, SOLUTION INTRAMUSCULAR; INTRAVENOUS; SUBCUTANEOUS at 04:07

## 2025-08-03 RX ADMIN — HYDROMORPHONE HYDROCHLORIDE 1 MG: 1 INJECTION, SOLUTION INTRAMUSCULAR; INTRAVENOUS; SUBCUTANEOUS at 03:04

## 2025-08-03 RX ADMIN — KETOROLAC TROMETHAMINE 15 MG: 15 INJECTION, SOLUTION INTRAMUSCULAR; INTRAVENOUS at 05:30

## 2025-08-03 RX ADMIN — METOCLOPRAMIDE 5 MG: 5 INJECTION, SOLUTION INTRAMUSCULAR; INTRAVENOUS at 04:02

## 2025-08-03 ASSESSMENT — FIBROSIS 4 INDEX: FIB4 SCORE: 0.42

## 2025-08-04 ENCOUNTER — HOSPITAL ENCOUNTER (EMERGENCY)
Facility: MEDICAL CENTER | Age: 20
End: 2025-08-04
Attending: EMERGENCY MEDICINE

## 2025-08-04 VITALS
TEMPERATURE: 97.2 F | RESPIRATION RATE: 16 BRPM | SYSTOLIC BLOOD PRESSURE: 117 MMHG | DIASTOLIC BLOOD PRESSURE: 80 MMHG | HEART RATE: 81 BPM | OXYGEN SATURATION: 97 % | HEIGHT: 63 IN | WEIGHT: 115.52 LBS | BODY MASS INDEX: 20.47 KG/M2

## 2025-08-04 DIAGNOSIS — Z46.6 ENCOUNTER FOR FOLEY CATHETER REMOVAL: Primary | ICD-10-CM

## 2025-08-04 PROCEDURE — 99283 EMERGENCY DEPT VISIT LOW MDM: CPT

## 2025-08-04 ASSESSMENT — FIBROSIS 4 INDEX: FIB4 SCORE: 0.41

## 2025-08-05 LAB
BACTERIA UR CULT: NORMAL
SIGNIFICANT IND 70042: NORMAL
SITE SITE: NORMAL
SOURCE SOURCE: NORMAL

## 2025-08-11 ENCOUNTER — TELEPHONE (OUTPATIENT)
Dept: SURGERY | Facility: MEDICAL CENTER | Age: 20
End: 2025-08-11

## 2025-08-23 ENCOUNTER — APPOINTMENT (OUTPATIENT)
Dept: RADIOLOGY | Facility: MEDICAL CENTER | Age: 20
End: 2025-08-23
Attending: STUDENT IN AN ORGANIZED HEALTH CARE EDUCATION/TRAINING PROGRAM

## 2025-08-23 ENCOUNTER — HOSPITAL ENCOUNTER (OUTPATIENT)
Facility: MEDICAL CENTER | Age: 20
End: 2025-08-24
Attending: STUDENT IN AN ORGANIZED HEALTH CARE EDUCATION/TRAINING PROGRAM | Admitting: HOSPITALIST

## 2025-08-23 DIAGNOSIS — N12 PYELONEPHRITIS: Primary | ICD-10-CM

## 2025-08-23 PROBLEM — N39.0 COMPLICATED UTI (URINARY TRACT INFECTION): Status: ACTIVE | Noted: 2025-08-23

## 2025-08-23 PROBLEM — R11.2 INTRACTABLE NAUSEA AND VOMITING: Status: ACTIVE | Noted: 2025-08-23

## 2025-08-23 LAB
ALBUMIN SERPL BCP-MCNC: 4.7 G/DL (ref 3.2–4.9)
ALBUMIN/GLOB SERPL: 1.7 G/DL
ALP SERPL-CCNC: 66 U/L (ref 30–99)
ALT SERPL-CCNC: 16 U/L (ref 2–50)
ANION GAP SERPL CALC-SCNC: 12 MMOL/L (ref 7–16)
APPEARANCE UR: CLEAR
AST SERPL-CCNC: 16 U/L (ref 12–45)
BACTERIA #/AREA URNS HPF: ABNORMAL /HPF
BASOPHILS # BLD AUTO: 0.7 % (ref 0–1.8)
BASOPHILS # BLD: 0.07 K/UL (ref 0–0.12)
BILIRUB SERPL-MCNC: 0.3 MG/DL (ref 0.1–1.5)
BILIRUB UR QL STRIP.AUTO: NEGATIVE
BUN SERPL-MCNC: 16 MG/DL (ref 8–22)
CALCIUM ALBUM COR SERPL-MCNC: 8.8 MG/DL (ref 8.5–10.5)
CALCIUM SERPL-MCNC: 9.4 MG/DL (ref 8.5–10.5)
CASTS URNS QL MICRO: ABNORMAL /LPF (ref 0–2)
CHLORIDE SERPL-SCNC: 106 MMOL/L (ref 96–112)
CO2 SERPL-SCNC: 21 MMOL/L (ref 20–33)
COLOR UR: YELLOW
CREAT SERPL-MCNC: 0.76 MG/DL (ref 0.5–1.4)
EOSINOPHIL # BLD AUTO: 0.07 K/UL (ref 0–0.51)
EOSINOPHIL NFR BLD: 0.7 % (ref 0–6.9)
EPITHELIAL CELLS 1715: ABNORMAL /HPF (ref 0–5)
ERYTHROCYTE [DISTWIDTH] IN BLOOD BY AUTOMATED COUNT: 44.6 FL (ref 35.9–50)
GFR SERPLBLD CREATININE-BSD FMLA CKD-EPI: 114 ML/MIN/1.73 M 2
GLOBULIN SER CALC-MCNC: 2.7 G/DL (ref 1.9–3.5)
GLUCOSE SERPL-MCNC: 90 MG/DL (ref 65–99)
GLUCOSE UR STRIP.AUTO-MCNC: NEGATIVE MG/DL
HCG SERPL QL: NEGATIVE
HCT VFR BLD AUTO: 40.7 % (ref 37–47)
HGB BLD-MCNC: 14.1 G/DL (ref 12–16)
IMM GRANULOCYTES # BLD AUTO: 0.03 K/UL (ref 0–0.11)
IMM GRANULOCYTES NFR BLD AUTO: 0.3 % (ref 0–0.9)
INR PPP: 0.92 (ref 0.87–1.13)
KETONES UR STRIP.AUTO-MCNC: NEGATIVE MG/DL
LEUKOCYTE ESTERASE UR QL STRIP.AUTO: ABNORMAL
LIPASE SERPL-CCNC: 24 U/L (ref 11–82)
LYMPHOCYTES # BLD AUTO: 1.68 K/UL (ref 1–4.8)
LYMPHOCYTES NFR BLD: 17.7 % (ref 22–41)
MCH RBC QN AUTO: 33.3 PG (ref 27–33)
MCHC RBC AUTO-ENTMCNC: 34.6 G/DL (ref 32.2–35.5)
MCV RBC AUTO: 96 FL (ref 81.4–97.8)
MICRO URNS: ABNORMAL
MONOCYTES # BLD AUTO: 0.64 K/UL (ref 0–0.85)
MONOCYTES NFR BLD AUTO: 6.8 % (ref 0–13.4)
NEUTROPHILS # BLD AUTO: 6.99 K/UL (ref 1.82–7.42)
NEUTROPHILS NFR BLD: 73.8 % (ref 44–72)
NITRITE UR QL STRIP.AUTO: POSITIVE
NRBC # BLD AUTO: 0 K/UL
NRBC BLD-RTO: 0 /100 WBC (ref 0–0.2)
PH UR STRIP.AUTO: 6.5 [PH] (ref 5–8)
PLATELET # BLD AUTO: 262 K/UL (ref 164–446)
PMV BLD AUTO: 8.7 FL (ref 9–12.9)
POTASSIUM SERPL-SCNC: 3.9 MMOL/L (ref 3.6–5.5)
PROCALCITONIN SERPL-MCNC: 0.03 NG/ML
PROT SERPL-MCNC: 7.4 G/DL (ref 6–8.2)
PROT UR QL STRIP: NEGATIVE MG/DL
PROTHROMBIN TIME: 12.7 SEC (ref 12–14.6)
RBC # BLD AUTO: 4.24 M/UL (ref 4.2–5.4)
RBC # URNS HPF: ABNORMAL /HPF
RBC UR QL AUTO: ABNORMAL
SODIUM SERPL-SCNC: 139 MMOL/L (ref 135–145)
SP GR UR STRIP.AUTO: 1.02
UROBILINOGEN UR STRIP.AUTO-MCNC: 0.2 EU/DL
WBC # BLD AUTO: 9.5 K/UL (ref 4.8–10.8)
WBC #/AREA URNS HPF: ABNORMAL /HPF

## 2025-08-23 PROCEDURE — 94760 N-INVAS EAR/PLS OXIMETRY 1: CPT

## 2025-08-23 PROCEDURE — 96376 TX/PRO/DX INJ SAME DRUG ADON: CPT

## 2025-08-23 PROCEDURE — 96374 THER/PROPH/DIAG INJ IV PUSH: CPT

## 2025-08-23 PROCEDURE — G0378 HOSPITAL OBSERVATION PER HR: HCPCS

## 2025-08-23 PROCEDURE — 51798 US URINE CAPACITY MEASURE: CPT

## 2025-08-23 PROCEDURE — 80053 COMPREHEN METABOLIC PANEL: CPT

## 2025-08-23 PROCEDURE — 81001 URINALYSIS AUTO W/SCOPE: CPT

## 2025-08-23 PROCEDURE — 87040 BLOOD CULTURE FOR BACTERIA: CPT

## 2025-08-23 PROCEDURE — 74176 CT ABD & PELVIS W/O CONTRAST: CPT

## 2025-08-23 PROCEDURE — 96366 THER/PROPH/DIAG IV INF ADDON: CPT

## 2025-08-23 PROCEDURE — 99222 1ST HOSP IP/OBS MODERATE 55: CPT | Performed by: HOSPITALIST

## 2025-08-23 PROCEDURE — 36415 COLL VENOUS BLD VENIPUNCTURE: CPT

## 2025-08-23 PROCEDURE — 700105 HCHG RX REV CODE 258: Performed by: HOSPITALIST

## 2025-08-23 PROCEDURE — 700102 HCHG RX REV CODE 250 W/ 637 OVERRIDE(OP): Performed by: HOSPITALIST

## 2025-08-23 PROCEDURE — 96375 TX/PRO/DX INJ NEW DRUG ADDON: CPT

## 2025-08-23 PROCEDURE — 96365 THER/PROPH/DIAG IV INF INIT: CPT

## 2025-08-23 PROCEDURE — 85025 COMPLETE CBC W/AUTO DIFF WBC: CPT

## 2025-08-23 PROCEDURE — 700111 HCHG RX REV CODE 636 W/ 250 OVERRIDE (IP): Mod: JZ | Performed by: HOSPITALIST

## 2025-08-23 PROCEDURE — 700111 HCHG RX REV CODE 636 W/ 250 OVERRIDE (IP): Mod: JZ | Performed by: STUDENT IN AN ORGANIZED HEALTH CARE EDUCATION/TRAINING PROGRAM

## 2025-08-23 PROCEDURE — 85610 PROTHROMBIN TIME: CPT

## 2025-08-23 PROCEDURE — 83690 ASSAY OF LIPASE: CPT

## 2025-08-23 PROCEDURE — 700105 HCHG RX REV CODE 258: Performed by: STUDENT IN AN ORGANIZED HEALTH CARE EDUCATION/TRAINING PROGRAM

## 2025-08-23 PROCEDURE — A9270 NON-COVERED ITEM OR SERVICE: HCPCS | Performed by: HOSPITALIST

## 2025-08-23 PROCEDURE — 99285 EMERGENCY DEPT VISIT HI MDM: CPT

## 2025-08-23 PROCEDURE — 84703 CHORIONIC GONADOTROPIN ASSAY: CPT

## 2025-08-23 PROCEDURE — 84145 PROCALCITONIN (PCT): CPT

## 2025-08-23 RX ORDER — HYDROMORPHONE HYDROCHLORIDE 1 MG/ML
1 INJECTION, SOLUTION INTRAMUSCULAR; INTRAVENOUS; SUBCUTANEOUS ONCE
Status: COMPLETED | OUTPATIENT
Start: 2025-08-23 | End: 2025-08-23

## 2025-08-23 RX ORDER — HYDROMORPHONE HYDROCHLORIDE 1 MG/ML
0.25 INJECTION, SOLUTION INTRAMUSCULAR; INTRAVENOUS; SUBCUTANEOUS
Status: DISCONTINUED | OUTPATIENT
Start: 2025-08-23 | End: 2025-08-24

## 2025-08-23 RX ORDER — DEXTROSE, SODIUM CHLORIDE, SODIUM LACTATE, POTASSIUM CHLORIDE, AND CALCIUM CHLORIDE 5; .6; .31; .03; .02 G/100ML; G/100ML; G/100ML; G/100ML; G/100ML
INJECTION, SOLUTION INTRAVENOUS CONTINUOUS
Status: DISCONTINUED | OUTPATIENT
Start: 2025-08-23 | End: 2025-08-23

## 2025-08-23 RX ORDER — CEFTRIAXONE 1 G/1
1000 INJECTION, POWDER, FOR SOLUTION INTRAMUSCULAR; INTRAVENOUS ONCE
Status: COMPLETED | OUTPATIENT
Start: 2025-08-23 | End: 2025-08-23

## 2025-08-23 RX ORDER — PROMETHAZINE HYDROCHLORIDE 25 MG/1
12.5-25 TABLET ORAL EVERY 4 HOURS PRN
Status: DISCONTINUED | OUTPATIENT
Start: 2025-08-23 | End: 2025-08-24 | Stop reason: HOSPADM

## 2025-08-23 RX ORDER — LEVONORGESTREL 52 MG/1
1 INTRAUTERINE DEVICE INTRAUTERINE
COMMUNITY

## 2025-08-23 RX ORDER — SODIUM CHLORIDE, SODIUM LACTATE, POTASSIUM CHLORIDE, AND CALCIUM CHLORIDE .6; .31; .03; .02 G/100ML; G/100ML; G/100ML; G/100ML
500 INJECTION, SOLUTION INTRAVENOUS
Status: DISCONTINUED | OUTPATIENT
Start: 2025-08-23 | End: 2025-08-24 | Stop reason: HOSPADM

## 2025-08-23 RX ORDER — ONDANSETRON 4 MG/1
4 TABLET, ORALLY DISINTEGRATING ORAL EVERY 4 HOURS PRN
Status: DISCONTINUED | OUTPATIENT
Start: 2025-08-23 | End: 2025-08-24 | Stop reason: HOSPADM

## 2025-08-23 RX ORDER — SODIUM CHLORIDE, SODIUM LACTATE, POTASSIUM CHLORIDE, CALCIUM CHLORIDE 600; 310; 30; 20 MG/100ML; MG/100ML; MG/100ML; MG/100ML
1000 INJECTION, SOLUTION INTRAVENOUS ONCE
Status: COMPLETED | OUTPATIENT
Start: 2025-08-23 | End: 2025-08-23

## 2025-08-23 RX ORDER — ONDANSETRON 2 MG/ML
4 INJECTION INTRAMUSCULAR; INTRAVENOUS EVERY 4 HOURS PRN
Status: DISCONTINUED | OUTPATIENT
Start: 2025-08-23 | End: 2025-08-24 | Stop reason: HOSPADM

## 2025-08-23 RX ORDER — PROCHLORPERAZINE EDISYLATE 5 MG/ML
5-10 INJECTION INTRAMUSCULAR; INTRAVENOUS EVERY 4 HOURS PRN
Status: DISCONTINUED | OUTPATIENT
Start: 2025-08-23 | End: 2025-08-24 | Stop reason: HOSPADM

## 2025-08-23 RX ORDER — ONDANSETRON 2 MG/ML
4 INJECTION INTRAMUSCULAR; INTRAVENOUS ONCE
Status: COMPLETED | OUTPATIENT
Start: 2025-08-23 | End: 2025-08-23

## 2025-08-23 RX ORDER — PROPRANOLOL HYDROCHLORIDE 10 MG/1
10 TABLET ORAL 3 TIMES DAILY PRN
Status: DISCONTINUED | OUTPATIENT
Start: 2025-08-23 | End: 2025-08-24 | Stop reason: HOSPADM

## 2025-08-23 RX ORDER — PROMETHAZINE HYDROCHLORIDE 25 MG/1
12.5-25 SUPPOSITORY RECTAL EVERY 4 HOURS PRN
Status: DISCONTINUED | OUTPATIENT
Start: 2025-08-23 | End: 2025-08-24 | Stop reason: HOSPADM

## 2025-08-23 RX ORDER — SODIUM CHLORIDE, SODIUM LACTATE, POTASSIUM CHLORIDE, CALCIUM CHLORIDE 600; 310; 30; 20 MG/100ML; MG/100ML; MG/100ML; MG/100ML
INJECTION, SOLUTION INTRAVENOUS CONTINUOUS
Status: DISCONTINUED | OUTPATIENT
Start: 2025-08-23 | End: 2025-08-24 | Stop reason: HOSPADM

## 2025-08-23 RX ORDER — ACETAMINOPHEN 325 MG/1
650 TABLET ORAL EVERY 6 HOURS PRN
Status: DISCONTINUED | OUTPATIENT
Start: 2025-08-23 | End: 2025-08-24 | Stop reason: HOSPADM

## 2025-08-23 RX ORDER — AMOXICILLIN 250 MG
2 CAPSULE ORAL EVERY EVENING
Status: DISCONTINUED | OUTPATIENT
Start: 2025-08-23 | End: 2025-08-23

## 2025-08-23 RX ORDER — POLYETHYLENE GLYCOL 3350 17 G/17G
1 POWDER, FOR SOLUTION ORAL
Status: DISCONTINUED | OUTPATIENT
Start: 2025-08-23 | End: 2025-08-23

## 2025-08-23 RX ORDER — HYDROMORPHONE HYDROCHLORIDE 1 MG/ML
0.5 INJECTION, SOLUTION INTRAMUSCULAR; INTRAVENOUS; SUBCUTANEOUS
Status: DISCONTINUED | OUTPATIENT
Start: 2025-08-23 | End: 2025-08-24 | Stop reason: HOSPADM

## 2025-08-23 RX ORDER — DOXEPIN HYDROCHLORIDE 10 MG/1
10 CAPSULE ORAL
Status: DISCONTINUED | OUTPATIENT
Start: 2025-08-23 | End: 2025-08-24 | Stop reason: HOSPADM

## 2025-08-23 RX ORDER — CETIRIZINE HYDROCHLORIDE 10 MG/1
10 TABLET ORAL DAILY
Status: DISCONTINUED | OUTPATIENT
Start: 2025-08-23 | End: 2025-08-24 | Stop reason: HOSPADM

## 2025-08-23 RX ADMIN — ONDANSETRON 4 MG: 2 INJECTION INTRAMUSCULAR; INTRAVENOUS at 04:24

## 2025-08-23 RX ADMIN — HYDROMORPHONE HYDROCHLORIDE 0.5 MG: 1 INJECTION, SOLUTION INTRAMUSCULAR; INTRAVENOUS; SUBCUTANEOUS at 08:39

## 2025-08-23 RX ADMIN — CEFAZOLIN 2 G: 2 INJECTION, POWDER, FOR SOLUTION INTRAVENOUS at 14:36

## 2025-08-23 RX ADMIN — CETIRIZINE HYDROCHLORIDE 10 MG: 10 TABLET, FILM COATED ORAL at 08:40

## 2025-08-23 RX ADMIN — ONDANSETRON 4 MG: 4 TABLET, ORALLY DISINTEGRATING ORAL at 20:33

## 2025-08-23 RX ADMIN — CEFAZOLIN 2 G: 2 INJECTION, POWDER, FOR SOLUTION INTRAVENOUS at 21:12

## 2025-08-23 RX ADMIN — SERTRALINE 200 MG: 50 TABLET, FILM COATED ORAL at 08:40

## 2025-08-23 RX ADMIN — HYDROMORPHONE HYDROCHLORIDE 1 MG: 1 INJECTION, SOLUTION INTRAMUSCULAR; INTRAVENOUS; SUBCUTANEOUS at 05:50

## 2025-08-23 RX ADMIN — SODIUM CHLORIDE, POTASSIUM CHLORIDE, SODIUM LACTATE AND CALCIUM CHLORIDE 1000 ML: 600; 310; 30; 20 INJECTION, SOLUTION INTRAVENOUS at 04:23

## 2025-08-23 RX ADMIN — PROCHLORPERAZINE EDISYLATE 5 MG: 5 INJECTION INTRAMUSCULAR; INTRAVENOUS at 12:22

## 2025-08-23 RX ADMIN — ONDANSETRON 4 MG: 2 INJECTION INTRAMUSCULAR; INTRAVENOUS at 08:28

## 2025-08-23 RX ADMIN — HYDROMORPHONE HYDROCHLORIDE 0.25 MG: 1 INJECTION, SOLUTION INTRAMUSCULAR; INTRAVENOUS; SUBCUTANEOUS at 11:03

## 2025-08-23 RX ADMIN — DOXEPIN HYDROCHLORIDE 10 MG: 10 CAPSULE ORAL at 21:15

## 2025-08-23 RX ADMIN — CEFTRIAXONE SODIUM 1000 MG: 1 INJECTION, POWDER, FOR SOLUTION INTRAMUSCULAR; INTRAVENOUS at 05:51

## 2025-08-23 RX ADMIN — SODIUM CHLORIDE, POTASSIUM CHLORIDE, SODIUM LACTATE AND CALCIUM CHLORIDE: 600; 310; 30; 20 INJECTION, SOLUTION INTRAVENOUS at 20:39

## 2025-08-23 RX ADMIN — HYDROMORPHONE HYDROCHLORIDE 0.25 MG: 1 INJECTION, SOLUTION INTRAMUSCULAR; INTRAVENOUS; SUBCUTANEOUS at 19:07

## 2025-08-23 RX ADMIN — SODIUM CHLORIDE, POTASSIUM CHLORIDE, SODIUM LACTATE AND CALCIUM CHLORIDE: 600; 310; 30; 20 INJECTION, SOLUTION INTRAVENOUS at 12:25

## 2025-08-23 RX ADMIN — HYDROMORPHONE HYDROCHLORIDE 1 MG: 1 INJECTION, SOLUTION INTRAMUSCULAR; INTRAVENOUS; SUBCUTANEOUS at 04:24

## 2025-08-23 RX ADMIN — ONDANSETRON 4 MG: 2 INJECTION INTRAMUSCULAR; INTRAVENOUS at 05:51

## 2025-08-23 ASSESSMENT — ENCOUNTER SYMPTOMS
NECK PAIN: 0
BRUISES/BLEEDS EASILY: 0
FLANK PAIN: 1
COUGH: 0
PALPITATIONS: 0
BLURRED VISION: 0
FEVER: 0
NAUSEA: 1
SORE THROAT: 0
SHORTNESS OF BREATH: 0
DOUBLE VISION: 0
INSOMNIA: 0
DEPRESSION: 0
HEADACHES: 0
DIZZINESS: 0
WEAKNESS: 0
VOMITING: 0
MYALGIAS: 0

## 2025-08-23 ASSESSMENT — COGNITIVE AND FUNCTIONAL STATUS - GENERAL
MOBILITY SCORE: 24
SUGGESTED CMS G CODE MODIFIER MOBILITY: CH
SUGGESTED CMS G CODE MODIFIER DAILY ACTIVITY: CH
DAILY ACTIVITIY SCORE: 24

## 2025-08-23 ASSESSMENT — LIFESTYLE VARIABLES
CONSUMPTION TOTAL: NEGATIVE
TOTAL SCORE: 0
HOW MANY TIMES IN THE PAST YEAR HAVE YOU HAD 5 OR MORE DRINKS IN A DAY: 0
EVER HAD A DRINK FIRST THING IN THE MORNING TO STEADY YOUR NERVES TO GET RID OF A HANGOVER: NO
AVERAGE NUMBER OF DAYS PER WEEK YOU HAVE A DRINK CONTAINING ALCOHOL: 2
ON A TYPICAL DAY WHEN YOU DRINK ALCOHOL HOW MANY DRINKS DO YOU HAVE: 2
ALCOHOL_USE: YES
TOTAL SCORE: 0
HAVE YOU EVER FELT YOU SHOULD CUT DOWN ON YOUR DRINKING: NO
HAVE PEOPLE ANNOYED YOU BY CRITICIZING YOUR DRINKING: NO
EVER FELT BAD OR GUILTY ABOUT YOUR DRINKING: NO
TOTAL SCORE: 0

## 2025-08-23 ASSESSMENT — PAIN DESCRIPTION - PAIN TYPE
TYPE: ACUTE PAIN

## 2025-08-23 ASSESSMENT — PAIN DESCRIPTION - DESCRIPTORS: DESCRIPTORS: ACHING

## 2025-08-23 ASSESSMENT — FIBROSIS 4 INDEX
FIB4 SCORE: 0.41
FIB4 SCORE: 0.41

## 2025-08-24 VITALS
DIASTOLIC BLOOD PRESSURE: 55 MMHG | BODY MASS INDEX: 22.54 KG/M2 | HEART RATE: 46 BPM | WEIGHT: 127.21 LBS | HEIGHT: 63 IN | TEMPERATURE: 96.5 F | SYSTOLIC BLOOD PRESSURE: 101 MMHG | RESPIRATION RATE: 17 BRPM | OXYGEN SATURATION: 92 %

## 2025-08-24 LAB
ANION GAP SERPL CALC-SCNC: 11 MMOL/L (ref 7–16)
BUN SERPL-MCNC: 10 MG/DL (ref 8–22)
CALCIUM SERPL-MCNC: 8.9 MG/DL (ref 8.5–10.5)
CHLORIDE SERPL-SCNC: 105 MMOL/L (ref 96–112)
CO2 SERPL-SCNC: 22 MMOL/L (ref 20–33)
CREAT SERPL-MCNC: 0.86 MG/DL (ref 0.5–1.4)
ERYTHROCYTE [DISTWIDTH] IN BLOOD BY AUTOMATED COUNT: 45.1 FL (ref 35.9–50)
GFR SERPLBLD CREATININE-BSD FMLA CKD-EPI: 98 ML/MIN/1.73 M 2
GLUCOSE SERPL-MCNC: 80 MG/DL (ref 65–99)
HCT VFR BLD AUTO: 37.7 % (ref 37–47)
HGB BLD-MCNC: 12.6 G/DL (ref 12–16)
MCH RBC QN AUTO: 32.6 PG (ref 27–33)
MCHC RBC AUTO-ENTMCNC: 33.4 G/DL (ref 32.2–35.5)
MCV RBC AUTO: 97.4 FL (ref 81.4–97.8)
PLATELET # BLD AUTO: 216 K/UL (ref 164–446)
PMV BLD AUTO: 8.7 FL (ref 9–12.9)
POTASSIUM SERPL-SCNC: 3.6 MMOL/L (ref 3.6–5.5)
RBC # BLD AUTO: 3.87 M/UL (ref 4.2–5.4)
SODIUM SERPL-SCNC: 138 MMOL/L (ref 135–145)
WBC # BLD AUTO: 6.1 K/UL (ref 4.8–10.8)

## 2025-08-24 PROCEDURE — 700111 HCHG RX REV CODE 636 W/ 250 OVERRIDE (IP): Mod: JZ | Performed by: HOSPITALIST

## 2025-08-24 PROCEDURE — G0378 HOSPITAL OBSERVATION PER HR: HCPCS

## 2025-08-24 PROCEDURE — 96376 TX/PRO/DX INJ SAME DRUG ADON: CPT

## 2025-08-24 PROCEDURE — 700102 HCHG RX REV CODE 250 W/ 637 OVERRIDE(OP): Performed by: HOSPITALIST

## 2025-08-24 PROCEDURE — 99239 HOSP IP/OBS DSCHRG MGMT >30: CPT | Performed by: HOSPITALIST

## 2025-08-24 PROCEDURE — A9270 NON-COVERED ITEM OR SERVICE: HCPCS | Performed by: HOSPITALIST

## 2025-08-24 PROCEDURE — 96366 THER/PROPH/DIAG IV INF ADDON: CPT

## 2025-08-24 PROCEDURE — 36415 COLL VENOUS BLD VENIPUNCTURE: CPT

## 2025-08-24 PROCEDURE — 700105 HCHG RX REV CODE 258: Performed by: HOSPITALIST

## 2025-08-24 PROCEDURE — 85027 COMPLETE CBC AUTOMATED: CPT

## 2025-08-24 PROCEDURE — 80048 BASIC METABOLIC PNL TOTAL CA: CPT

## 2025-08-24 RX ORDER — CEPHALEXIN 500 MG/1
500 CAPSULE ORAL 4 TIMES DAILY
Qty: 24 CAPSULE | Refills: 0 | Status: ACTIVE | OUTPATIENT
Start: 2025-08-24 | End: 2025-08-30

## 2025-08-24 RX ORDER — OXYCODONE HYDROCHLORIDE 5 MG/1
5 TABLET ORAL EVERY 4 HOURS PRN
Refills: 0 | Status: DISCONTINUED | OUTPATIENT
Start: 2025-08-24 | End: 2025-08-24 | Stop reason: HOSPADM

## 2025-08-24 RX ADMIN — OXYCODONE 5 MG: 5 TABLET ORAL at 11:38

## 2025-08-24 RX ADMIN — ONDANSETRON 4 MG: 2 INJECTION INTRAMUSCULAR; INTRAVENOUS at 11:39

## 2025-08-24 RX ADMIN — SODIUM CHLORIDE, POTASSIUM CHLORIDE, SODIUM LACTATE AND CALCIUM CHLORIDE: 600; 310; 30; 20 INJECTION, SOLUTION INTRAVENOUS at 12:32

## 2025-08-24 RX ADMIN — SODIUM CHLORIDE, POTASSIUM CHLORIDE, SODIUM LACTATE AND CALCIUM CHLORIDE: 600; 310; 30; 20 INJECTION, SOLUTION INTRAVENOUS at 04:52

## 2025-08-24 RX ADMIN — CEFAZOLIN 2 G: 2 INJECTION, POWDER, FOR SOLUTION INTRAVENOUS at 14:11

## 2025-08-24 RX ADMIN — CEFAZOLIN 2 G: 2 INJECTION, POWDER, FOR SOLUTION INTRAVENOUS at 04:54

## 2025-08-24 ASSESSMENT — PAIN DESCRIPTION - PAIN TYPE
TYPE: ACUTE PAIN
TYPE: ACUTE PAIN

## 2025-08-24 ASSESSMENT — ENCOUNTER SYMPTOMS
NAUSEA: 0
FEVER: 0
BACK PAIN: 1
BLURRED VISION: 0
VOMITING: 0
MYALGIAS: 0
COUGH: 0
DOUBLE VISION: 0
NERVOUS/ANXIOUS: 0
HEARTBURN: 0
DEPRESSION: 0
HEMOPTYSIS: 0
DIZZINESS: 0

## 2025-08-28 LAB
BACTERIA BLD CULT: NORMAL
BACTERIA BLD CULT: NORMAL
SIGNIFICANT IND 70042: NORMAL
SIGNIFICANT IND 70042: NORMAL
SITE SITE: NORMAL
SITE SITE: NORMAL
SOURCE SOURCE: NORMAL
SOURCE SOURCE: NORMAL

## (undated) DEVICE — NEPTUNE 4 PORT MANIFOLD - (20/PK)

## (undated) DEVICE — CONNECTOR HOSE NEPTUNE FOR CYSTO ROOM

## (undated) DEVICE — ZIPWIRE .038 STRAIGHT BENTSON TIP (5EA/BX)

## (undated) DEVICE — FIBER LASER MOSES 200 UM (1/EA)

## (undated) DEVICE — GOWN WARMING STANDARD FLEX - (30/CA)

## (undated) DEVICE — LACTATED RINGERS INJ 1000 ML - (14EA/CA 60CA/PF)

## (undated) DEVICE — CONTAINER SPECIMEN BAG OR - STERILE 4 OZ W/LID (100EA/CA)

## (undated) DEVICE — SET LEADWIRE 5 LEAD BEDSIDE DISPOSABLE ECG (1SET OF 5/EA)

## (undated) DEVICE — MASK ANESTHESIA ADULT  - (100/CA)

## (undated) DEVICE — CATHETER BALLOON NEPHROMAX

## (undated) DEVICE — GLOVE BIOGEL SZ 7 SURGICAL PF LTX - (50PR/BX 4BX/CA)

## (undated) DEVICE — HEAD HOLDER JUNIOR/ADULT

## (undated) DEVICE — TOWEL STOP TIMEOUT SAFETY FLAG (40EA/CA)

## (undated) DEVICE — SUCTION INSTRUMENT YANKAUER BULBOUS TIP W/O VENT (50EA/CA)

## (undated) DEVICE — TUBING CLEARLINK DUO-VENT - C-FLO (48EA/CA)

## (undated) DEVICE — ELECTRODE 850 FOAM ADHESIVE - HYDROGEL RADIOTRNSPRNT (50/PK)

## (undated) DEVICE — GLOVE BIOGEL PI INDICATOR SZ 7.0 SURGICAL PF LF - (50/BX 4BX/CA)

## (undated) DEVICE — PEN SKIN MARKER W/RULER - (50EA/BX)

## (undated) DEVICE — SET IRRIGATION CYSTOSCOPY Y-TYPE L81 IN (20EA/CA)

## (undated) DEVICE — SLEEVE VASO DVT COMPRESSION CALF MED - (10PR/CA)

## (undated) DEVICE — COVER FOOT UNIVERSAL DISP. - (25EA/CA)

## (undated) DEVICE — SET EXTENSION WITH 2 PORTS (48EA/CA) ***PART #2C8610 IS A SUBSTITUTE*****

## (undated) DEVICE — GOWN SURGICAL X-LARGE ULTRA - FILM-REINFORCED (20/CA)

## (undated) DEVICE — PACK CYSTOSCOPY III - (8/CA)

## (undated) DEVICE — SLEEVE VASO CALF MED - (10PR/CA)

## (undated) DEVICE — TRAY FOLEY CATHETER STATLOCK 16FR SURESTEP  (10EA/CA)

## (undated) DEVICE — BAG DRAINAGE URINARY CLOSED 2000ML (20EA/CA)

## (undated) DEVICE — GLOVE BIOGEL PI INDICATOR SZ 7.5 SURGICAL PF LF -(50/BX 4BX/CA)

## (undated) DEVICE — SODIUM CHL. IRRIGATION 0.9% 3000ML (4EA/CA 65CA/PF)

## (undated) DEVICE — SPONGE GAUZESTER 4 X 4 4PLY - (128PK/CA)

## (undated) DEVICE — MEDICINE CUP STERILE 2 OZ (100/CA)

## (undated) DEVICE — COVER LIGHT HANDLE ALC PLUS DISP (18EA/BX)

## (undated) DEVICE — PLUG CATHETER DRAIN TUBE PROTECTOR CAP

## (undated) DEVICE — GOWN SURGEONS X-LARGE - DISP. (30/CA)

## (undated) DEVICE — Device

## (undated) DEVICE — SCOPE DIGITAL URETEROSCOPE DISPOSABLE (10EA/PK)

## (undated) DEVICE — WATER IRRIGATION STERILE 1000ML (12EA/CA)

## (undated) DEVICE — KIT ROOM DECONTAMINATION

## (undated) DEVICE — CANISTER SUCTION 3000ML MECHANICAL FILTER AUTO SHUTOFF MEDI-VAC NONSTERILE LF DISP  (40EA/CA)

## (undated) DEVICE — GLOVE BIOGEL SZ 6.5 SURGICAL PF LTX (50PR/BX 4BX/CA)

## (undated) DEVICE — WIRE GUIDE SENSOR DUAL FLEX - 5/BX

## (undated) DEVICE — CANISTER SUCTION 3000ML MECHANICAL FILTER AUTO SHUTOFF MEDI-VAC NONSTERILE LF DISP (40EA/CA)

## (undated) DEVICE — BASKET ZERO 1.9FR X 120CM

## (undated) DEVICE — BAG DRAINAGE LINGEMAN CYSTO FOR GE/OEC 2600/2800 TABLES (20EA/CA) USE #28358

## (undated) DEVICE — DRAPE LARGE 3 QUARTER - (20/CA)

## (undated) DEVICE — SUTURE GENERAL

## (undated) DEVICE — GLOVE BIOGEL INDICATOR SZ 7.5 SURGICAL PF LTX - (50PR/BX 4BX/CA)

## (undated) DEVICE — GLOVE BIOGEL SZ 7.5 SURGICAL PF LTX - (50PR/BX 4BX/CA)

## (undated) DEVICE — TUBE CONNECT SUCTION CLEAR 120 X 1/4" (50EA/CA)"

## (undated) DEVICE — SLEEVE, VASO, THIGH, MED

## (undated) DEVICE — CHLORAPREP 26 ML APPLICATOR - ORANGE TINT(25/CA)

## (undated) DEVICE — PROTECTOR ULNA NERVE - (36PR/CA)

## (undated) DEVICE — BAG SPONGE COUNT 10.25 X 32 - BLUE (250/CA)

## (undated) DEVICE — SENSOR SPO2 NEO LNCS ADHESIVE (20/BX) SEE USER NOTES

## (undated) DEVICE — CATHETER URETHRAL FOLEY SILICONE OD16 FR 10 ML (10EA/CA)

## (undated) DEVICE — TOWELS CLOTH SURGICAL - (4/PK 20PK/CA)

## (undated) DEVICE — SCOPE DIGITAL URETEROSCOPE DISPOSABLE

## (undated) DEVICE — KIT ANESTHESIA W/CIRCUIT & 3/LT BAG W/FILTER (20EA/CA)

## (undated) DEVICE — SYRINGE DISP. 12 CC LL - (100/BX)

## (undated) DEVICE — BAG URODRAIN WITH TUBING - (20/CA)

## (undated) DEVICE — PACK CYSTO III (2EA/CA)

## (undated) DEVICE — MEDICINE CUP STERILE 2 OZ - (100/CA)

## (undated) DEVICE — WATER IRRIG. STER 3000 ML - (4/CA)

## (undated) DEVICE — PACK CYSTOSCOPY - (14/CA)

## (undated) DEVICE — MASK AIRWAY SIZE 3 UNIQUE SILICON (10/BX)

## (undated) DEVICE — GLOVE BIOGEL PI ORTHO SZ 6 1/2 SURGICAL PF LF (40PR/BX)

## (undated) DEVICE — LASER TRAC TIP 200 MIRCON FOR 100 WATT LASER

## (undated) DEVICE — JELLY SURGILUBE STERILE FOIL 5 GM (150EA/BX)

## (undated) DEVICE — CATHETER URETHRAL FOLEY SILICONE OD14 FR 10 ML (10EA/CA)

## (undated) DEVICE — SENSOR OXIMETER ADULT SPO2 RD SET (20EA/BX)

## (undated) DEVICE — SET FLUID WARMING IRRIGATRION CYSTOSCOPY (10EA/CA)

## (undated) DEVICE — GOWN SURGICAL XX-LARGE - (28EA/CA) SIRUS NON REINFORCED

## (undated) DEVICE — SODIUM CHL IRRIGATION 0.9% 1000ML (12EA/CA)

## (undated) DEVICE — BASKET ZERO TIP

## (undated) DEVICE — SHEATH ACCESS URETERAL NAVIGATOR HD STAINLESS STEEL HYDROPHILIC L36 CM OD11-13 FR (1EA)

## (undated) DEVICE — GLOVE SIZE 7.0 SURGEON ACCELERATOR FREE GREEN (50PR/BX 4BX/CA)

## (undated) DEVICE — DRAPE T CRANIOTOMY W/POUCH - (9/CA)

## (undated) DEVICE — JELLY, KY 2 0Z STERILE

## (undated) DEVICE — AMPLATZ SUPER STIFF 180CM - (5/BX)

## (undated) DEVICE — GLOVE SZ 7.5 BIOGEL PI MICRO - PF LF (50PR/BX)

## (undated) DEVICE — SHEATH NAVIGATOR 11/13 X 36 URETERAL ACCESS (5EA/BX)

## (undated) DEVICE — GLOVE COTTON STERILE (50/CA)

## (undated) DEVICE — JELLY SURGILUBE STERILE TUBE 4.25 OZ (1/EA)

## (undated) DEVICE — GLOVE SZ 6.5 BIOGEL PI MICRO - PF LF (50PR/BX)

## (undated) DEVICE — LEGGING LITHOTOMY 31 X 48 IN - (2EA/PK 20PK/CA)

## (undated) DEVICE — SYRINGE STERILE 10 ML LL (200/BX)

## (undated) DEVICE — CATHETER URET DUAL LUMEN

## (undated) DEVICE — SYRINGE 50ML CATHETER TIP (40EA/BX 4BX/CA)

## (undated) DEVICE — GLOVE SZ 7 BIOGEL PI MICRO - PF LF (50PR/BX 4BX/CA)

## (undated) DEVICE — LASER FIBER HOLM 365 MICRON 100 WATT (5EA/BX)

## (undated) DEVICE — SPECIMEN PLASTIC CONVERTOR - (10/CA)

## (undated) DEVICE — COVER LIGHT HANDLE FLEXIBLE - SOFT (2EA/PK 80PK/CA)

## (undated) DEVICE — FIBER LASER MOSES 365 UM (1/EA)

## (undated) DEVICE — BLADE SURGICAL #11 - (50/BX)

## (undated) DEVICE — ARMREST CRADLE FOAM - (2PR/PK 12PR/CA)

## (undated) DEVICE — PACK MINOR BASIN - (2EA/CA)

## (undated) DEVICE — DRESSING ABDOMINAL PAD STERILE 8 X 10" (360EA/CA)"

## (undated) DEVICE — SUTURE 2-0 SILK FS (12EA/BX)

## (undated) DEVICE — GLOVE BIOGEL PI INDICATOR SZ 6.5 SURGICAL PF LF - (50/BX 4BX/CA)

## (undated) DEVICE — GLOVE BIOGEL INDICATOR SZ 6.5 SURGICAL PF LTX - (50PR/BX 4BX/CA)

## (undated) DEVICE — DEVICE STONE RETRIEVAL DAKOTA OPENSURE FLEXIBLE BASKET 1.9FRX120CM (1EA)

## (undated) DEVICE — CATHETER URETHRAL OPEN END AXXCESS (10EA/BX)

## (undated) DEVICE — DRAPE C-ARM LARGE 41IN X 74 IN - (10/BX 2BX/CA)

## (undated) DEVICE — KIT 2.25IN UROS 2 PC DRN - 57MM 2 1/4 INCH (5/BX)

## (undated) DEVICE — BAG DRAINAGE LINGEMAN CYSTO/URO (20EA/CA)

## (undated) DEVICE — TUBE E-T HI-LO CUFF 6.5MM (10EA/BX)

## (undated) DEVICE — WATER IRRIG. STER. 1500 ML - (9/CA)

## (undated) DEVICE — ELECTRODE DUAL RETURN W/ CORD - (50/PK)

## (undated) DEVICE — CATHETER URET OPEN END 6FR (10EA/BX)